# Patient Record
Sex: MALE | Race: WHITE | NOT HISPANIC OR LATINO | Employment: FULL TIME | URBAN - METROPOLITAN AREA
[De-identification: names, ages, dates, MRNs, and addresses within clinical notes are randomized per-mention and may not be internally consistent; named-entity substitution may affect disease eponyms.]

---

## 2021-03-03 ENCOUNTER — OFFICE VISIT (OUTPATIENT)
Dept: FAMILY MEDICINE CLINIC | Facility: CLINIC | Age: 56
End: 2021-03-03

## 2021-03-03 ENCOUNTER — LAB (OUTPATIENT)
Dept: FAMILY MEDICINE CLINIC | Facility: CLINIC | Age: 56
End: 2021-03-03

## 2021-03-03 VITALS
RESPIRATION RATE: 16 BRPM | HEART RATE: 69 BPM | OXYGEN SATURATION: 98 % | BODY MASS INDEX: 32.2 KG/M2 | DIASTOLIC BLOOD PRESSURE: 82 MMHG | TEMPERATURE: 96.9 F | WEIGHT: 259 LBS | SYSTOLIC BLOOD PRESSURE: 130 MMHG | HEIGHT: 75 IN

## 2021-03-03 DIAGNOSIS — I10 ESSENTIAL HYPERTENSION: ICD-10-CM

## 2021-03-03 DIAGNOSIS — E78.5 HYPERLIPIDEMIA, UNSPECIFIED HYPERLIPIDEMIA TYPE: ICD-10-CM

## 2021-03-03 DIAGNOSIS — Z00.00 ANNUAL PHYSICAL EXAM: Primary | ICD-10-CM

## 2021-03-03 DIAGNOSIS — Z12.5 SCREENING PSA (PROSTATE SPECIFIC ANTIGEN): ICD-10-CM

## 2021-03-03 DIAGNOSIS — B35.1 ONYCHOMYCOSIS: ICD-10-CM

## 2021-03-03 LAB
25(OH)D3 SERPL-MCNC: 44.9 NG/ML (ref 30–100)
ALBUMIN SERPL-MCNC: 4.3 G/DL (ref 3.5–5.2)
ALBUMIN/GLOB SERPL: 1.3 G/DL
ALP SERPL-CCNC: 78 U/L (ref 39–117)
ALT SERPL W P-5'-P-CCNC: 19 U/L (ref 1–41)
ANION GAP SERPL CALCULATED.3IONS-SCNC: 11.2 MMOL/L (ref 5–15)
AST SERPL-CCNC: 17 U/L (ref 1–40)
BASOPHILS # BLD AUTO: 0.05 10*3/MM3 (ref 0–0.2)
BASOPHILS NFR BLD AUTO: 0.6 % (ref 0–1.5)
BILIRUB SERPL-MCNC: 0.6 MG/DL (ref 0–1.2)
BUN SERPL-MCNC: 20 MG/DL (ref 6–20)
BUN/CREAT SERPL: 17.1 (ref 7–25)
CALCIUM SPEC-SCNC: 9.5 MG/DL (ref 8.6–10.5)
CHLORIDE SERPL-SCNC: 105 MMOL/L (ref 98–107)
CHOLEST SERPL-MCNC: 149 MG/DL (ref 0–200)
CO2 SERPL-SCNC: 25.8 MMOL/L (ref 22–29)
CREAT SERPL-MCNC: 1.17 MG/DL (ref 0.76–1.27)
DEPRECATED RDW RBC AUTO: 41.4 FL (ref 37–54)
EOSINOPHIL # BLD AUTO: 0.19 10*3/MM3 (ref 0–0.4)
EOSINOPHIL NFR BLD AUTO: 2.3 % (ref 0.3–6.2)
ERYTHROCYTE [DISTWIDTH] IN BLOOD BY AUTOMATED COUNT: 12.8 % (ref 12.3–15.4)
GFR SERPL CREATININE-BSD FRML MDRD: 64 ML/MIN/1.73
GLOBULIN UR ELPH-MCNC: 3.4 GM/DL
GLUCOSE SERPL-MCNC: 97 MG/DL (ref 65–99)
HBA1C MFR BLD: 5.2 % (ref 3.5–5.6)
HCT VFR BLD AUTO: 42.6 % (ref 37.5–51)
HCV AB SER DONR QL: NORMAL
HDLC SERPL-MCNC: 34 MG/DL (ref 40–60)
HGB BLD-MCNC: 14.3 G/DL (ref 13–17.7)
IMM GRANULOCYTES # BLD AUTO: 0.02 10*3/MM3 (ref 0–0.05)
IMM GRANULOCYTES NFR BLD AUTO: 0.2 % (ref 0–0.5)
LDLC SERPL CALC-MCNC: 94 MG/DL (ref 0–100)
LDLC/HDLC SERPL: 2.71 {RATIO}
LYMPHOCYTES # BLD AUTO: 2.86 10*3/MM3 (ref 0.7–3.1)
LYMPHOCYTES NFR BLD AUTO: 34.5 % (ref 19.6–45.3)
MCH RBC QN AUTO: 29.9 PG (ref 26.6–33)
MCHC RBC AUTO-ENTMCNC: 33.6 G/DL (ref 31.5–35.7)
MCV RBC AUTO: 89.1 FL (ref 79–97)
MONOCYTES # BLD AUTO: 0.71 10*3/MM3 (ref 0.1–0.9)
MONOCYTES NFR BLD AUTO: 8.6 % (ref 5–12)
NEUTROPHILS NFR BLD AUTO: 4.45 10*3/MM3 (ref 1.7–7)
NEUTROPHILS NFR BLD AUTO: 53.8 % (ref 42.7–76)
NRBC BLD AUTO-RTO: 0 /100 WBC (ref 0–0.2)
PLATELET # BLD AUTO: 297 10*3/MM3 (ref 140–450)
PMV BLD AUTO: 9.7 FL (ref 6–12)
POTASSIUM SERPL-SCNC: 4.2 MMOL/L (ref 3.5–5.2)
PROT SERPL-MCNC: 7.7 G/DL (ref 6–8.5)
PSA SERPL-MCNC: 0.89 NG/ML (ref 0–4)
RBC # BLD AUTO: 4.78 10*6/MM3 (ref 4.14–5.8)
SODIUM SERPL-SCNC: 142 MMOL/L (ref 136–145)
T4 FREE SERPL-MCNC: 1.02 NG/DL (ref 0.93–1.7)
TRIGL SERPL-MCNC: 114 MG/DL (ref 0–150)
TSH SERPL DL<=0.05 MIU/L-ACNC: 1.94 UIU/ML (ref 0.27–4.2)
VIT B12 BLD-MCNC: 582 PG/ML (ref 211–946)
VLDLC SERPL-MCNC: 21 MG/DL (ref 5–40)
WBC # BLD AUTO: 8.28 10*3/MM3 (ref 3.4–10.8)

## 2021-03-03 PROCEDURE — 86803 HEPATITIS C AB TEST: CPT | Performed by: FAMILY MEDICINE

## 2021-03-03 PROCEDURE — 84443 ASSAY THYROID STIM HORMONE: CPT | Performed by: FAMILY MEDICINE

## 2021-03-03 PROCEDURE — 84439 ASSAY OF FREE THYROXINE: CPT | Performed by: FAMILY MEDICINE

## 2021-03-03 PROCEDURE — 85025 COMPLETE CBC W/AUTO DIFF WBC: CPT | Performed by: FAMILY MEDICINE

## 2021-03-03 PROCEDURE — 83036 HEMOGLOBIN GLYCOSYLATED A1C: CPT | Performed by: FAMILY MEDICINE

## 2021-03-03 PROCEDURE — 80053 COMPREHEN METABOLIC PANEL: CPT | Performed by: FAMILY MEDICINE

## 2021-03-03 PROCEDURE — 99386 PREV VISIT NEW AGE 40-64: CPT | Performed by: FAMILY MEDICINE

## 2021-03-03 PROCEDURE — 82306 VITAMIN D 25 HYDROXY: CPT | Performed by: FAMILY MEDICINE

## 2021-03-03 PROCEDURE — 82607 VITAMIN B-12: CPT | Performed by: FAMILY MEDICINE

## 2021-03-03 PROCEDURE — 36415 COLL VENOUS BLD VENIPUNCTURE: CPT

## 2021-03-03 PROCEDURE — 80061 LIPID PANEL: CPT | Performed by: FAMILY MEDICINE

## 2021-03-03 PROCEDURE — G0103 PSA SCREENING: HCPCS | Performed by: FAMILY MEDICINE

## 2021-03-03 RX ORDER — FLUTICASONE PROPIONATE 50 MCG
2 SPRAY, SUSPENSION (ML) NASAL DAILY
COMMUNITY
End: 2022-03-09

## 2021-03-03 RX ORDER — ATORVASTATIN CALCIUM 20 MG/1
20 TABLET, FILM COATED ORAL
COMMUNITY
Start: 2021-02-20 | End: 2021-03-03 | Stop reason: SDUPTHER

## 2021-03-03 RX ORDER — AMLODIPINE BESYLATE 10 MG/1
10 TABLET ORAL
COMMUNITY
Start: 2021-02-18 | End: 2021-03-03 | Stop reason: SDUPTHER

## 2021-03-03 RX ORDER — AMLODIPINE BESYLATE 10 MG/1
10 TABLET ORAL
Qty: 90 TABLET | Refills: 3 | Status: SHIPPED | OUTPATIENT
Start: 2021-03-03 | End: 2021-05-19 | Stop reason: SDUPTHER

## 2021-03-03 RX ORDER — TERBINAFINE HYDROCHLORIDE 250 MG/1
250 TABLET ORAL DAILY
Qty: 84 TABLET | Refills: 0 | Status: SHIPPED | OUTPATIENT
Start: 2021-03-03 | End: 2021-03-31 | Stop reason: SDUPTHER

## 2021-03-03 RX ORDER — ATORVASTATIN CALCIUM 20 MG/1
20 TABLET, FILM COATED ORAL
Qty: 90 TABLET | Refills: 3 | Status: SHIPPED | OUTPATIENT
Start: 2021-03-03 | End: 2021-03-31 | Stop reason: SDUPTHER

## 2021-03-03 RX ORDER — MULTIPLE VITAMINS W/ MINERALS TAB 9MG-400MCG
1 TAB ORAL DAILY
COMMUNITY

## 2021-03-03 NOTE — PROGRESS NOTES
Chief Complaint   Patient presents with   • Annual Exam     New patient     HPI  Ulisses Rivera is a 55 y.o. male that presents for   Chief Complaint   Patient presents with   • Annual Exam     New patient     HTN: 130/82 today. Checks BP regularly and generally runs 125-130 systolic. Maintained on amlodipine 10 daily. No LH/dizziness, CP or SOB    HLD: maintained on atorvastatin 20mg daily. No myalgias or other complaints    Nail change: patient reports thickening of the 3rd-5th nails of the R foot. Ongoing for quite some time. Interested in treatment today    Review of Systems   Constitutional: Negative for chills, fever and unexpected weight loss.   HENT: Negative for congestion, rhinorrhea and sore throat.    Eyes: Negative for visual disturbance.   Respiratory: Negative for cough and shortness of breath.    Cardiovascular: Negative for chest pain and palpitations.   Gastrointestinal: Negative for abdominal pain, constipation, diarrhea, nausea and vomiting.   Genitourinary: Negative for difficulty urinating, dysuria and nocturia.   Musculoskeletal: Negative for arthralgias and joint swelling.   Skin: Positive for rash (nail change) and skin lesions.   Neurological: Negative for dizziness, weakness, light-headedness and headache.   Psychiatric/Behavioral: Negative for depressed mood. The patient is not nervous/anxious.      The following portions of the patient's history were reviewed and updated as appropriate: problem list, past medical history, past surgical history, allergies, current medications, past social history and past family history.    Problem List Tab  Patient History Tab  Immunizations Tab  Medications Tab  Chart Review Tab  Care Everywhere Tab  Synopsis Tab    PE  Vitals:    03/03/21 0802   BP: 130/82   Pulse: 69   Resp: 16   Temp: 96.9 °F (36.1 °C)   SpO2: 98%     Body mass index is 32.37 kg/m².  General: Obese, NAD  Head: AT/NC  Eyes: EOMI, anicteric sclera  ENT: MMM w/o erythema. TM clear  bilaterally  Neck: Supple, no thyromegaly or LAD. No carotid bruit  Resp: CTAB, SCR, BS equal  CV: RRR w/o m/r/g; 2+ pulses  GI: Soft, NT/ND, +BS  MSK: FROM, no deformity, no edema  Skin: Warm, dry, intact. Thickened, discolored nails to digits 3-5 of R foot. 0.5cm cyst to lateral aspect of L 2nd MCP.  Neuro: Alert and oriented. No focal deficits  Psych: Appropriate mood and affect    Imaging  No Images in the past 120 days found..    Assessment/Plan   Ulisses Rivera is a 55 y.o. male that presents for   Chief Complaint   Patient presents with   • Annual Exam     New patient     Diagnoses and all orders for this visit:    1. Annual physical exam (Primary)  -     Hepatitis C Antibody  -     CBC & Differential  -     Comprehensive Metabolic Panel  -     Hemoglobin A1c  -     Lipid Panel  -     TSH  -     T4, Free  -     Vitamin D 25 Hydroxy  -     Vitamin B12  -     PSA Screen  -     CBC Auto Differential  -  Counseled regarding diet, exercise, weight loss, and preventative health maintenance items/immunizations below    2. Essential hypertension: 130/82 today  -     Continue amLODIPine (NORVASC) 10 MG tablet; Take 1 tablet by mouth every night at bedtime.  Dispense: 90 tablet; Refill: 3    3. Hyperlipidemia, unspecified hyperlipidemia type  -     Continue atorvastatin (LIPITOR) 20 MG tablet; Take 1 tablet by mouth every night at bedtime.  Dispense: 90 tablet; Refill: 3    4. Onychomycosis  -     Start terbinafine (LamISIL) 250 MG tablet; Take 1 tablet by mouth Daily.  Dispense: 84 tablet; Refill: 0  -     Comprehensive metabolic panel; Future in 6 weeks    5. Screening PSA (prostate specific antigen)  -     PSA Screen    Preventative  Colonoscopy: 2016- report requested  PSA: Ordered today  Shingles: Recommended  Tdap: reportedly 2012  Influenza: 11/2020; Recommended  COVID: Scheduled for next week     Return in about 1 year (around 3/3/2022) for Annual physical.

## 2021-03-30 DIAGNOSIS — B35.1 ONYCHOMYCOSIS: ICD-10-CM

## 2021-03-30 DIAGNOSIS — E78.5 HYPERLIPIDEMIA, UNSPECIFIED HYPERLIPIDEMIA TYPE: ICD-10-CM

## 2021-03-30 RX ORDER — ATORVASTATIN CALCIUM 20 MG/1
20 TABLET, FILM COATED ORAL
Qty: 90 TABLET | Refills: 3 | Status: CANCELLED | OUTPATIENT
Start: 2021-03-30

## 2021-03-31 DIAGNOSIS — E78.5 HYPERLIPIDEMIA, UNSPECIFIED HYPERLIPIDEMIA TYPE: ICD-10-CM

## 2021-03-31 DIAGNOSIS — B35.1 ONYCHOMYCOSIS: ICD-10-CM

## 2021-03-31 RX ORDER — ATORVASTATIN CALCIUM 20 MG/1
20 TABLET, FILM COATED ORAL
Qty: 90 TABLET | Refills: 3 | Status: SHIPPED | OUTPATIENT
Start: 2021-03-31 | End: 2021-05-19 | Stop reason: SDUPTHER

## 2021-03-31 RX ORDER — TERBINAFINE HYDROCHLORIDE 250 MG/1
250 TABLET ORAL DAILY
Qty: 84 TABLET | Refills: 0 | OUTPATIENT
Start: 2021-03-31

## 2021-03-31 RX ORDER — TERBINAFINE HYDROCHLORIDE 250 MG/1
250 TABLET ORAL DAILY
Qty: 30 TABLET | Refills: 1 | Status: SHIPPED | OUTPATIENT
Start: 2021-03-31 | End: 2021-04-01 | Stop reason: SDUPTHER

## 2021-04-01 DIAGNOSIS — B35.1 ONYCHOMYCOSIS: ICD-10-CM

## 2021-04-01 RX ORDER — TERBINAFINE HYDROCHLORIDE 250 MG/1
250 TABLET ORAL DAILY
Qty: 30 TABLET | Refills: 1 | Status: SHIPPED | OUTPATIENT
Start: 2021-04-01 | End: 2022-03-09

## 2021-04-26 ENCOUNTER — PROCEDURE VISIT (OUTPATIENT)
Dept: FAMILY MEDICINE CLINIC | Facility: CLINIC | Age: 56
End: 2021-04-26

## 2021-04-26 VITALS
BODY MASS INDEX: 32.47 KG/M2 | OXYGEN SATURATION: 98 % | HEIGHT: 75 IN | RESPIRATION RATE: 16 BRPM | HEART RATE: 73 BPM | WEIGHT: 261.13 LBS | SYSTOLIC BLOOD PRESSURE: 142 MMHG | DIASTOLIC BLOOD PRESSURE: 94 MMHG | TEMPERATURE: 96.9 F

## 2021-04-26 DIAGNOSIS — D22.4 ATYPICAL NEVUS OF NECK: ICD-10-CM

## 2021-04-26 DIAGNOSIS — L91.8 SKIN TAG: Primary | ICD-10-CM

## 2021-04-26 DIAGNOSIS — D22.9 MULTIPLE NEVI: ICD-10-CM

## 2021-04-26 PROCEDURE — 17111 DESTRUCTION B9 LESIONS 15/>: CPT | Performed by: PHYSICIAN ASSISTANT

## 2021-04-26 PROCEDURE — 11200 RMVL SKIN TAGS UP TO&INC 15: CPT | Performed by: PHYSICIAN ASSISTANT

## 2021-04-26 PROCEDURE — 99213 OFFICE O/P EST LOW 20 MIN: CPT | Performed by: PHYSICIAN ASSISTANT

## 2021-04-26 NOTE — PROGRESS NOTES
"Subjective   Ulisses Rivera is a 55 y.o. male.     Chief Complaint   Patient presents with   • Nevus       /94 (BP Location: Left arm, Patient Position: Sitting, Cuff Size: Adult)   Pulse 73   Temp 96.9 °F (36.1 °C) (Skin)   Resp 16   Ht 190.5 cm (75\")   Wt 118 kg (261 lb 2 oz)   SpO2 98%   BMI 32.64 kg/m²     BP Readings from Last 3 Encounters:   04/26/21 142/94   03/03/21 130/82       Wt Readings from Last 3 Encounters:   04/26/21 118 kg (261 lb 2 oz)   03/03/21 117 kg (259 lb)       HPI patient presents to the clinic for evaluation of multiple nevus and for skin tags on his face and on his neck.  The first time I am seeing the patient for this however he typically sees Dr. Agosto.  It was told to come into the office to have a procedure performed to have the nevus removed.  He has seen dermatology in the past.  He does not notice any changing of any of the moles.  He does travel for work frequently.  He has never been diagnosed with a skin cancer in the past.     The following portions of the patient's history were reviewed and updated as appropriate: allergies, current medications, past family history, past medical history, past social history, past surgical history and problem list.    Review of Systems    Objective     Physical Exam  Constitutional:       Appearance: Normal appearance.   Eyes:      Extraocular Movements: Extraocular movements intact.      Pupils: Pupils are equal, round, and reactive to light.   Skin:     Comments: Multiple scattered maculopapular brown and skin colored raised lesions to the face and the neck. 2 pedunculated skin colored lesions to the right side of the face and the back left portion of the neck. One irregularly shaped flat dark colored macule to the right neck.    Neurological:      General: No focal deficit present.      Mental Status: He is alert and oriented to person, place, and time.   Psychiatric:         Mood and Affect: Mood normal.         Behavior: " Behavior normal.       Skin Tag Removal    Date/Time: 4/26/2021 5:50 PM  Performed by: Neal Villatoro PA-C  Authorized by: Neal Villatoro PA-C   Preparation: Patient was prepped and draped in the usual sterile fashion.  Local anesthesia used: yes  Anesthesia: local infiltration    Anesthesia:  Local anesthesia used: yes  Local Anesthetic: lidocaine 1% with epinephrine  Anesthetic total: 1 mL    Sedation:  Patient sedated: no    Comments: 2 skin tags removed.       Cryotherapy, Skin Lesion    Date/Time: 4/26/2021 5:51 PM  Performed by: eNal Villatoro PA-C  Authorized by: Neal Villatoro PA-C   Local anesthesia used: no    Anesthesia:  Local anesthesia used: no    Sedation:  Patient sedated: no    Patient tolerance: patient tolerated the procedure well with no immediate complications  Comments: >15 skin lesions               Diagnoses and all orders for this visit:    1. Skin tag (Primary)    2. Multiple nevi    3. Atypical nevus of neck  Comments:  watch for evolving characteristics. Follow up with derm for this lesion.    Other orders  -     Skin Tag Removal  -     Cryotherapy, Skin Lesion  -     Cryotherapy, Skin Lesion        Return if symptoms worsen or fail to improve.

## 2021-05-19 DIAGNOSIS — E78.5 HYPERLIPIDEMIA, UNSPECIFIED HYPERLIPIDEMIA TYPE: ICD-10-CM

## 2021-05-19 DIAGNOSIS — I10 ESSENTIAL HYPERTENSION: ICD-10-CM

## 2021-05-19 NOTE — TELEPHONE ENCOUNTER
----- Message from Jane Liang CMA sent at 5/19/2021 11:40 AM EDT -----  Regarding: FW: Prescription Question  Contact: [phone]    ----- Message -----  From: Ulisses Rivera  Sent: 5/19/2021  10:03 AM EDT  To: Ulisses Agosto MD  Subject: Prescription Question                            I am in [place] for next week and forgot my blood pressure and cholesterol medication. Are you able to call into a [facility] at [facility] [address] in [place] my prescriptions so I do not have to go a week without them?? Exact address is below.    [address] [place] [address] [place] [address]

## 2021-05-20 RX ORDER — ATORVASTATIN CALCIUM 20 MG/1
20 TABLET, FILM COATED ORAL
Qty: 10 TABLET | Refills: 0 | Status: SHIPPED | OUTPATIENT
Start: 2021-05-20 | End: 2022-03-09 | Stop reason: SDUPTHER

## 2021-05-20 RX ORDER — AMLODIPINE BESYLATE 10 MG/1
10 TABLET ORAL
Qty: 10 TABLET | Refills: 0 | Status: SHIPPED | OUTPATIENT
Start: 2021-05-20 | End: 2022-03-09 | Stop reason: SDUPTHER

## 2021-09-03 ENCOUNTER — OFFICE VISIT (OUTPATIENT)
Dept: FAMILY MEDICINE CLINIC | Facility: CLINIC | Age: 56
End: 2021-09-03

## 2021-09-03 VITALS
SYSTOLIC BLOOD PRESSURE: 122 MMHG | BODY MASS INDEX: 31.71 KG/M2 | HEIGHT: 75 IN | OXYGEN SATURATION: 97 % | DIASTOLIC BLOOD PRESSURE: 78 MMHG | HEART RATE: 78 BPM | WEIGHT: 255 LBS | RESPIRATION RATE: 16 BRPM

## 2021-09-03 DIAGNOSIS — L81.9 PIGMENTED SKIN LESION SUSPICIOUS FOR MALIGNANT NEOPLASM: ICD-10-CM

## 2021-09-03 DIAGNOSIS — D22.9 MULTIPLE NEVI: Primary | ICD-10-CM

## 2021-09-03 PROCEDURE — 99213 OFFICE O/P EST LOW 20 MIN: CPT | Performed by: PHYSICIAN ASSISTANT

## 2021-09-03 NOTE — PROGRESS NOTES
"Subjective   Ulisses Rivera is a 55 y.o. male.     Chief Complaint   Patient presents with   • Suspicious Skin Lesion     multiple       /78 (BP Location: Left arm, Patient Position: Sitting, Cuff Size: Adult)   Pulse 78   Resp 16   Ht 190.5 cm (75\")   Wt 116 kg (255 lb)   SpO2 97%   BMI 31.87 kg/m²     BP Readings from Last 3 Encounters:   09/03/21 122/78   04/26/21 142/94   03/03/21 130/82       Wt Readings from Last 3 Encounters:   09/03/21 116 kg (255 lb)   04/26/21 118 kg (261 lb 2 oz)   03/03/21 117 kg (259 lb)       HPI Patient presents to the clinic with multiple skin lesions he would like looked at. Had some froze at previous visit. He has seen derm in the past. No history of skin cancer. Travels a lot. No other issue.s     The following portions of the patient's history were reviewed and updated as appropriate: allergies, current medications, past family history, past medical history, past social history, past surgical history and problem list.    Review of Systems    Objective     Physical Exam  Constitutional:       Appearance: Normal appearance.   Eyes:      Extraocular Movements: Extraocular movements intact.      Pupils: Pupils are equal, round, and reactive to light.   Skin:     Comments: Multiple scattered maculopapular brown and skin colored raised lesions to the face and the neck.. One irregularly shaped flat dark colored macule to the right neck. One raised red round lesion to the top parietal area of the scalp.    Neurological:      General: No focal deficit present.      Mental Status: He is alert and oriented to person, place, and time.   Psychiatric:         Mood and Affect: Mood normal.         Behavior: Behavior normal.       Cryotherapy, Skin Lesion    Date/Time: 9/3/2021 12:52 PM  Performed by: Neal Villatoro PA-C  Authorized by: Neal Villatoro PA-C   Local anesthesia used: no    Anesthesia:  Local anesthesia used: no    Sedation:  Patient sedated: no    Patient " tolerance: patient tolerated the procedure well with no immediate complications          Diagnoses and all orders for this visit:    1. Multiple nevi (Primary)  -     Ambulatory Referral to Dermatology    2. Pigmented skin lesion suspicious for malignant neoplasm  -     Ambulatory Referral to Dermatology    Other orders  -     ECG 12 Lead  -     Cryotherapy, Skin Lesion        No follow-ups on file.

## 2022-03-09 ENCOUNTER — LAB (OUTPATIENT)
Dept: FAMILY MEDICINE CLINIC | Facility: CLINIC | Age: 57
End: 2022-03-09

## 2022-03-09 ENCOUNTER — PATIENT ROUNDING (BHMG ONLY) (OUTPATIENT)
Dept: FAMILY MEDICINE CLINIC | Facility: CLINIC | Age: 57
End: 2022-03-09

## 2022-03-09 ENCOUNTER — OFFICE VISIT (OUTPATIENT)
Dept: FAMILY MEDICINE CLINIC | Facility: CLINIC | Age: 57
End: 2022-03-09

## 2022-03-09 VITALS
TEMPERATURE: 97.3 F | DIASTOLIC BLOOD PRESSURE: 88 MMHG | SYSTOLIC BLOOD PRESSURE: 116 MMHG | HEIGHT: 75 IN | BODY MASS INDEX: 32.7 KG/M2 | HEART RATE: 75 BPM | RESPIRATION RATE: 18 BRPM | OXYGEN SATURATION: 97 % | WEIGHT: 263 LBS

## 2022-03-09 DIAGNOSIS — Z12.5 SCREENING PSA (PROSTATE SPECIFIC ANTIGEN): ICD-10-CM

## 2022-03-09 DIAGNOSIS — E78.5 HYPERLIPIDEMIA, UNSPECIFIED HYPERLIPIDEMIA TYPE: ICD-10-CM

## 2022-03-09 DIAGNOSIS — I10 ESSENTIAL HYPERTENSION: ICD-10-CM

## 2022-03-09 DIAGNOSIS — Z00.00 ANNUAL PHYSICAL EXAM: Primary | ICD-10-CM

## 2022-03-09 DIAGNOSIS — I10 PRIMARY HYPERTENSION: ICD-10-CM

## 2022-03-09 LAB
25(OH)D3 SERPL-MCNC: 38.8 NG/ML (ref 30–100)
ALBUMIN SERPL-MCNC: 4.7 G/DL (ref 3.5–5.2)
ALBUMIN/GLOB SERPL: 1.5 G/DL
ALP SERPL-CCNC: 93 U/L (ref 39–117)
ALT SERPL W P-5'-P-CCNC: 11 U/L (ref 1–41)
ANION GAP SERPL CALCULATED.3IONS-SCNC: 12.4 MMOL/L (ref 5–15)
AST SERPL-CCNC: 15 U/L (ref 1–40)
BASOPHILS # BLD AUTO: 0.04 10*3/MM3 (ref 0–0.2)
BASOPHILS NFR BLD AUTO: 0.7 % (ref 0–1.5)
BILIRUB SERPL-MCNC: 0.6 MG/DL (ref 0–1.2)
BUN SERPL-MCNC: 16 MG/DL (ref 6–20)
BUN/CREAT SERPL: 13.8 (ref 7–25)
CALCIUM SPEC-SCNC: 9.5 MG/DL (ref 8.6–10.5)
CHLORIDE SERPL-SCNC: 105 MMOL/L (ref 98–107)
CHOLEST SERPL-MCNC: 152 MG/DL (ref 0–200)
CO2 SERPL-SCNC: 24.6 MMOL/L (ref 22–29)
CREAT SERPL-MCNC: 1.16 MG/DL (ref 0.76–1.27)
DEPRECATED RDW RBC AUTO: 41.8 FL (ref 37–54)
EGFRCR SERPLBLD CKD-EPI 2021: 73.5 ML/MIN/1.73
EOSINOPHIL # BLD AUTO: 0.22 10*3/MM3 (ref 0–0.4)
EOSINOPHIL NFR BLD AUTO: 3.6 % (ref 0.3–6.2)
ERYTHROCYTE [DISTWIDTH] IN BLOOD BY AUTOMATED COUNT: 12.6 % (ref 12.3–15.4)
GLOBULIN UR ELPH-MCNC: 3.1 GM/DL
GLUCOSE SERPL-MCNC: 107 MG/DL (ref 65–99)
HBA1C MFR BLD: 5.1 % (ref 3.5–5.6)
HCT VFR BLD AUTO: 42.7 % (ref 37.5–51)
HDLC SERPL-MCNC: 32 MG/DL (ref 40–60)
HGB BLD-MCNC: 14.2 G/DL (ref 13–17.7)
IMM GRANULOCYTES # BLD AUTO: 0 10*3/MM3 (ref 0–0.05)
IMM GRANULOCYTES NFR BLD AUTO: 0 % (ref 0–0.5)
LDLC SERPL CALC-MCNC: 100 MG/DL (ref 0–100)
LDLC/HDLC SERPL: 3.09 {RATIO}
LYMPHOCYTES # BLD AUTO: 2.43 10*3/MM3 (ref 0.7–3.1)
LYMPHOCYTES NFR BLD AUTO: 40 % (ref 19.6–45.3)
MCH RBC QN AUTO: 29.7 PG (ref 26.6–33)
MCHC RBC AUTO-ENTMCNC: 33.3 G/DL (ref 31.5–35.7)
MCV RBC AUTO: 89.3 FL (ref 79–97)
MONOCYTES # BLD AUTO: 0.58 10*3/MM3 (ref 0.1–0.9)
MONOCYTES NFR BLD AUTO: 9.5 % (ref 5–12)
NEUTROPHILS NFR BLD AUTO: 2.81 10*3/MM3 (ref 1.7–7)
NEUTROPHILS NFR BLD AUTO: 46.2 % (ref 42.7–76)
NRBC BLD AUTO-RTO: 0 /100 WBC (ref 0–0.2)
PLATELET # BLD AUTO: 274 10*3/MM3 (ref 140–450)
PMV BLD AUTO: 9.7 FL (ref 6–12)
POTASSIUM SERPL-SCNC: 4 MMOL/L (ref 3.5–5.2)
PROT SERPL-MCNC: 7.8 G/DL (ref 6–8.5)
PSA SERPL-MCNC: 0.45 NG/ML (ref 0–4)
RBC # BLD AUTO: 4.78 10*6/MM3 (ref 4.14–5.8)
SODIUM SERPL-SCNC: 142 MMOL/L (ref 136–145)
T4 FREE SERPL-MCNC: 1.08 NG/DL (ref 0.93–1.7)
TRIGL SERPL-MCNC: 105 MG/DL (ref 0–150)
TSH SERPL DL<=0.05 MIU/L-ACNC: 1.75 UIU/ML (ref 0.27–4.2)
VIT B12 BLD-MCNC: 446 PG/ML (ref 211–946)
VLDLC SERPL-MCNC: 20 MG/DL (ref 5–40)
WBC NRBC COR # BLD: 6.08 10*3/MM3 (ref 3.4–10.8)

## 2022-03-09 PROCEDURE — 36415 COLL VENOUS BLD VENIPUNCTURE: CPT | Performed by: FAMILY MEDICINE

## 2022-03-09 PROCEDURE — 80053 COMPREHEN METABOLIC PANEL: CPT | Performed by: FAMILY MEDICINE

## 2022-03-09 PROCEDURE — 84443 ASSAY THYROID STIM HORMONE: CPT | Performed by: FAMILY MEDICINE

## 2022-03-09 PROCEDURE — 82607 VITAMIN B-12: CPT | Performed by: FAMILY MEDICINE

## 2022-03-09 PROCEDURE — G0103 PSA SCREENING: HCPCS | Performed by: FAMILY MEDICINE

## 2022-03-09 PROCEDURE — 80061 LIPID PANEL: CPT | Performed by: FAMILY MEDICINE

## 2022-03-09 PROCEDURE — 84439 ASSAY OF FREE THYROXINE: CPT | Performed by: FAMILY MEDICINE

## 2022-03-09 PROCEDURE — 83036 HEMOGLOBIN GLYCOSYLATED A1C: CPT | Performed by: FAMILY MEDICINE

## 2022-03-09 PROCEDURE — 85025 COMPLETE CBC W/AUTO DIFF WBC: CPT | Performed by: FAMILY MEDICINE

## 2022-03-09 PROCEDURE — 99396 PREV VISIT EST AGE 40-64: CPT | Performed by: FAMILY MEDICINE

## 2022-03-09 PROCEDURE — 82306 VITAMIN D 25 HYDROXY: CPT | Performed by: FAMILY MEDICINE

## 2022-03-09 RX ORDER — AMLODIPINE BESYLATE 10 MG/1
10 TABLET ORAL
Qty: 90 TABLET | Refills: 4 | Status: SHIPPED | OUTPATIENT
Start: 2022-03-09 | End: 2023-04-02 | Stop reason: SDUPTHER

## 2022-03-09 RX ORDER — ATORVASTATIN CALCIUM 20 MG/1
20 TABLET, FILM COATED ORAL
Qty: 90 TABLET | Refills: 4 | Status: SHIPPED | OUTPATIENT
Start: 2022-03-09 | End: 2023-03-20 | Stop reason: SDUPTHER

## 2022-03-09 RX ORDER — AMLODIPINE BESYLATE 10 MG/1
10 TABLET ORAL
Qty: 90 TABLET | Refills: 1 | Status: SHIPPED | OUTPATIENT
Start: 2022-03-09 | End: 2022-08-15

## 2022-03-09 RX ORDER — ATORVASTATIN CALCIUM 20 MG/1
20 TABLET, FILM COATED ORAL
Qty: 90 TABLET | Refills: 1 | Status: SHIPPED | OUTPATIENT
Start: 2022-03-09 | End: 2022-08-15

## 2022-03-09 NOTE — PROGRESS NOTES
March 9, 2022    Kloud[employer]atch Message sent to Ulisses Rivera    From: Jessica [place]  [facility]  [address]  [place] [address].

## 2022-03-09 NOTE — PROGRESS NOTES
Chief Complaint   Patient presents with   • Annual Exam     HPI  Ulisses Rivera is a 56 y.o. male that presents for   Chief Complaint   Patient presents with   • Annual Exam     HTN: 116/88 today. Maintained on amlodipine 10 daily. No LH/dizziness, CP, SOB, or leg swelling     HLD: maintained on atorvastatin 20mg daily. No myalgias or other complaints    Exercising 3-4 days/week.     Review of Systems   Constitutional: Negative for chills, fever and unexpected weight loss.   HENT: Negative for congestion, rhinorrhea and sore throat.    Eyes: Negative for visual disturbance.   Respiratory: Negative for cough and shortness of breath.    Cardiovascular: Negative for chest pain and palpitations.   Gastrointestinal: Negative for abdominal pain, constipation, diarrhea, nausea and vomiting.   Genitourinary: Negative for difficulty urinating and dysuria.   Musculoskeletal: Negative for arthralgias and joint swelling.   Skin: Negative for rash and skin lesions.   Neurological: Negative for weakness and headache.   Psychiatric/Behavioral: Negative for depressed mood. The patient is not nervous/anxious.      The following portions of the patient's history were reviewed and updated as appropriate: problem list, past medical history, past surgical history, allergies, current medications, past social history and past family history.    Problem List Tab  Patient History Tab  Immunizations Tab  Medications Tab  Chart Review Tab  Care Everywhere Tab  Synopsis Tab    PE  Vitals:    03/09/22 0840   BP: 116/88   Pulse: 75   Resp: 18   Temp: 97.3 °F (36.3 °C)   SpO2: 97%     Body mass index is 32.87 kg/m².  General: Obese, NAD  Head: AT/NC  Eyes: EOMI, anicteric sclera  ENT: MMM w/o erythema. TM clear bilaterally  Neck: Supple, no thyromegaly or LAD. No carotid bruit  Resp: CTAB, SCR, BS equal  CV: RRR w/o m/r/g; 2+ pulses  GI: Soft, NT/ND, +BS  MSK: FROM, no deformity, no edema  Skin: Warm, dry, intact  Neuro: Alert and oriented. No  focal deficits  Psych: Appropriate mood and affect    Imaging  No Images in the past 120 days found..    Assessment/Plan   Ulisses Rivera is a 56 y.o. male that presents for   Chief Complaint   Patient presents with   • Annual Exam     Diagnoses and all orders for this visit:    1. Annual physical exam (Primary)  -     CBC & Differential  -     Comprehensive Metabolic Panel  -     Hemoglobin A1c  -     Lipid Panel  -     TSH  -     T4, Free  -     Vitamin D 25 Hydroxy  -     Vitamin B12  -     PSA Screen  -     Ambulatory Referral For Screening Colonoscopy  -  Counseled regarding diet, exercise, weight loss, and preventative health maintenance items/immunizations below    2. Primary hypertension: 116/88 today  -     continue amLODIPine (NORVASC) 10 MG tablet; Take 1 tablet by mouth every night at bedtime.  Dispense: 90 tablet; Refill: 4    3. Hyperlipidemia, unspecified hyperlipidemia type  -     continue atorvastatin (LIPITOR) 20 MG tablet; Take 1 tablet by mouth every night at bedtime.  Dispense: 90 tablet; Refill: 4    4. Screening PSA (prostate specific antigen)  -     PSA Screen    Preventative  Colonoscopy: 6/2016, due 6/2021- ordered today  PSA: 0.888 (3/2021) Ordered today  Shingles: Completed Shingrix 1/2022  Tdap: reportedly 2017  Influenza: 8/2021; Recommended  COVID: Completed 3 shots Pfizer       Return in about 1 year (around 3/9/2023) for Annual physical.

## 2022-08-14 DIAGNOSIS — I10 ESSENTIAL HYPERTENSION: ICD-10-CM

## 2022-08-14 DIAGNOSIS — E78.5 HYPERLIPIDEMIA, UNSPECIFIED HYPERLIPIDEMIA TYPE: ICD-10-CM

## 2022-08-15 RX ORDER — AMLODIPINE BESYLATE 10 MG/1
10 TABLET ORAL
Qty: 90 TABLET | Refills: 1 | Status: SHIPPED | OUTPATIENT
Start: 2022-08-15 | End: 2023-02-16

## 2022-08-15 RX ORDER — ATORVASTATIN CALCIUM 20 MG/1
20 TABLET, FILM COATED ORAL
Qty: 90 TABLET | Refills: 1 | Status: SHIPPED | OUTPATIENT
Start: 2022-08-15 | End: 2023-03-20

## 2022-09-28 ENCOUNTER — ON CAMPUS - OUTPATIENT (AMBULATORY)
Dept: URBAN - METROPOLITAN AREA HOSPITAL 2 | Facility: HOSPITAL | Age: 57
End: 2022-09-28
Payer: COMMERCIAL

## 2022-09-28 VITALS
OXYGEN SATURATION: 97 % | HEART RATE: 65 BPM | OXYGEN SATURATION: 99 % | OXYGEN SATURATION: 100 % | HEART RATE: 61 BPM | DIASTOLIC BLOOD PRESSURE: 96 MMHG | WEIGHT: 261 LBS | SYSTOLIC BLOOD PRESSURE: 153 MMHG | HEART RATE: 71 BPM | HEIGHT: 75 IN | HEART RATE: 60 BPM | DIASTOLIC BLOOD PRESSURE: 95 MMHG | HEART RATE: 69 BPM | DIASTOLIC BLOOD PRESSURE: 80 MMHG | DIASTOLIC BLOOD PRESSURE: 85 MMHG | DIASTOLIC BLOOD PRESSURE: 70 MMHG | HEART RATE: 58 BPM | SYSTOLIC BLOOD PRESSURE: 129 MMHG | TEMPERATURE: 97.1 F | SYSTOLIC BLOOD PRESSURE: 107 MMHG | DIASTOLIC BLOOD PRESSURE: 74 MMHG | SYSTOLIC BLOOD PRESSURE: 108 MMHG | RESPIRATION RATE: 17 BRPM | HEART RATE: 62 BPM | RESPIRATION RATE: 16 BRPM | RESPIRATION RATE: 14 BRPM | DIASTOLIC BLOOD PRESSURE: 75 MMHG | HEART RATE: 72 BPM | DIASTOLIC BLOOD PRESSURE: 78 MMHG | DIASTOLIC BLOOD PRESSURE: 84 MMHG | OXYGEN SATURATION: 98 % | SYSTOLIC BLOOD PRESSURE: 137 MMHG | SYSTOLIC BLOOD PRESSURE: 125 MMHG | SYSTOLIC BLOOD PRESSURE: 116 MMHG | RESPIRATION RATE: 18 BRPM | SYSTOLIC BLOOD PRESSURE: 102 MMHG | SYSTOLIC BLOOD PRESSURE: 112 MMHG

## 2022-09-28 DIAGNOSIS — K57.30 DIVERTICULOSIS OF LARGE INTESTINE WITHOUT PERFORATION OR ABS: ICD-10-CM

## 2022-09-28 DIAGNOSIS — Z12.11 ENCOUNTER FOR SCREENING FOR MALIGNANT NEOPLASM OF COLON: ICD-10-CM

## 2022-09-28 PROCEDURE — 45378 DIAGNOSTIC COLONOSCOPY: CPT | Mod: 33 | Performed by: INTERNAL MEDICINE

## 2023-02-16 DIAGNOSIS — I10 ESSENTIAL HYPERTENSION: ICD-10-CM

## 2023-02-16 RX ORDER — AMLODIPINE BESYLATE 10 MG/1
10 TABLET ORAL
Qty: 90 TABLET | Refills: 1 | Status: SHIPPED | OUTPATIENT
Start: 2023-02-16 | End: 2023-03-28 | Stop reason: SDUPTHER

## 2023-03-18 DIAGNOSIS — E78.5 HYPERLIPIDEMIA, UNSPECIFIED HYPERLIPIDEMIA TYPE: ICD-10-CM

## 2023-03-20 RX ORDER — ATORVASTATIN CALCIUM 20 MG/1
20 TABLET, FILM COATED ORAL
Qty: 90 TABLET | Refills: 1 | Status: SHIPPED | OUTPATIENT
Start: 2023-03-20 | End: 2023-04-03 | Stop reason: SDUPTHER

## 2023-03-28 ENCOUNTER — OFFICE VISIT (OUTPATIENT)
Dept: FAMILY MEDICINE CLINIC | Facility: CLINIC | Age: 58
End: 2023-03-28
Payer: COMMERCIAL

## 2023-03-28 ENCOUNTER — LAB (OUTPATIENT)
Dept: FAMILY MEDICINE CLINIC | Facility: CLINIC | Age: 58
End: 2023-03-28
Payer: COMMERCIAL

## 2023-03-28 VITALS
WEIGHT: 259.4 LBS | HEART RATE: 68 BPM | SYSTOLIC BLOOD PRESSURE: 134 MMHG | DIASTOLIC BLOOD PRESSURE: 70 MMHG | OXYGEN SATURATION: 100 % | RESPIRATION RATE: 18 BRPM | HEIGHT: 75 IN | BODY MASS INDEX: 32.25 KG/M2

## 2023-03-28 DIAGNOSIS — I10 ESSENTIAL HYPERTENSION: ICD-10-CM

## 2023-03-28 DIAGNOSIS — R30.0 DYSURIA: ICD-10-CM

## 2023-03-28 DIAGNOSIS — Z12.5 SCREENING PSA (PROSTATE SPECIFIC ANTIGEN): ICD-10-CM

## 2023-03-28 DIAGNOSIS — Z00.00 ANNUAL PHYSICAL EXAM: ICD-10-CM

## 2023-03-28 DIAGNOSIS — Z00.00 ANNUAL PHYSICAL EXAM: Primary | ICD-10-CM

## 2023-03-28 DIAGNOSIS — E78.5 HYPERLIPIDEMIA, UNSPECIFIED HYPERLIPIDEMIA TYPE: ICD-10-CM

## 2023-03-28 LAB
BILIRUB UR QL STRIP: NEGATIVE
C TRACH RRNA CVX QL NAA+PROBE: NOT DETECTED
CLARITY UR: ABNORMAL
COLOR UR: YELLOW
GLUCOSE UR STRIP-MCNC: NEGATIVE MG/DL
HGB UR QL STRIP.AUTO: NEGATIVE
KETONES UR QL STRIP: NEGATIVE
LEUKOCYTE ESTERASE UR QL STRIP.AUTO: NEGATIVE
N GONORRHOEA RRNA SPEC QL NAA+PROBE: NOT DETECTED
NITRITE UR QL STRIP: NEGATIVE
PH UR STRIP.AUTO: 6 [PH] (ref 5–8)
PROT UR QL STRIP: NEGATIVE
SP GR UR STRIP: 1.01 (ref 1–1.03)
UROBILINOGEN UR QL STRIP: ABNORMAL

## 2023-03-28 PROCEDURE — G0432 EIA HIV-1/HIV-2 SCREEN: HCPCS | Performed by: PHYSICIAN ASSISTANT

## 2023-03-28 PROCEDURE — 36415 COLL VENOUS BLD VENIPUNCTURE: CPT

## 2023-03-28 PROCEDURE — 85025 COMPLETE CBC W/AUTO DIFF WBC: CPT | Performed by: PHYSICIAN ASSISTANT

## 2023-03-28 PROCEDURE — 80074 ACUTE HEPATITIS PANEL: CPT | Performed by: PHYSICIAN ASSISTANT

## 2023-03-28 PROCEDURE — 87491 CHLMYD TRACH DNA AMP PROBE: CPT | Performed by: PHYSICIAN ASSISTANT

## 2023-03-28 PROCEDURE — 99396 PREV VISIT EST AGE 40-64: CPT | Performed by: PHYSICIAN ASSISTANT

## 2023-03-28 PROCEDURE — G0103 PSA SCREENING: HCPCS | Performed by: PHYSICIAN ASSISTANT

## 2023-03-28 PROCEDURE — 81003 URINALYSIS AUTO W/O SCOPE: CPT | Performed by: PHYSICIAN ASSISTANT

## 2023-03-28 PROCEDURE — 80061 LIPID PANEL: CPT | Performed by: PHYSICIAN ASSISTANT

## 2023-03-28 PROCEDURE — 83036 HEMOGLOBIN GLYCOSYLATED A1C: CPT | Performed by: PHYSICIAN ASSISTANT

## 2023-03-28 PROCEDURE — 87591 N.GONORRHOEAE DNA AMP PROB: CPT | Performed by: PHYSICIAN ASSISTANT

## 2023-03-28 PROCEDURE — 80053 COMPREHEN METABOLIC PANEL: CPT | Performed by: PHYSICIAN ASSISTANT

## 2023-03-28 NOTE — PROGRESS NOTES
"Subjective   Ulisses Rivera is a 57 y.o. male.     Chief Complaint   Patient presents with   • Annual Exam     He is fasting for labs  No issues, no med refills       /70   Pulse 68   Resp 18   Ht 190.5 cm (75\")   Wt 118 kg (259 lb 6.4 oz)   SpO2 100%   BMI 32.42 kg/m²     BP Readings from Last 3 Encounters:   03/28/23 134/70   03/09/22 116/88   09/03/21 122/78       Wt Readings from Last 3 Encounters:   03/28/23 118 kg (259 lb 6.4 oz)   03/09/22 119 kg (263 lb)   09/03/21 116 kg (255 lb)       HPI Presents to the clinic for annual physical. Doing well. Trying to watch diet but travels. No chest pain, soa, headaches. Has had some dysuria that resolved. Has one new sexual partner. Screenings utd. Had some bph problems but improved.     The following portions of the patient's history were reviewed and updated as appropriate: allergies, current medications, past family history, past medical history, past social history, past surgical history and problem list.    Review of Systems    Objective   Physical Exam  Constitutional:       Appearance: He is well-developed.   HENT:      Head: Normocephalic and atraumatic.      Right Ear: Tympanic membrane normal.      Left Ear: Tympanic membrane normal.      Nose: No congestion.      Mouth/Throat:      Pharynx: No oropharyngeal exudate.   Eyes:      Conjunctiva/sclera: Conjunctivae normal.      Pupils: Pupils are equal, round, and reactive to light.   Cardiovascular:      Rate and Rhythm: Normal rate and regular rhythm.      Heart sounds: No murmur heard.  Pulmonary:      Effort: Pulmonary effort is normal.      Breath sounds: Normal breath sounds.   Abdominal:      General: Bowel sounds are normal.      Palpations: Abdomen is soft.      Tenderness: There is no abdominal tenderness.   Musculoskeletal:         General: No deformity. Normal range of motion.      Cervical back: Normal range of motion and neck supple.   Lymphadenopathy:      Cervical: No cervical " adenopathy.   Skin:     General: Skin is warm and dry.      Findings: No rash.   Neurological:      Mental Status: He is alert and oriented to person, place, and time.      Cranial Nerves: No cranial nerve deficit.   Psychiatric:         Behavior: Behavior normal.         Thought Content: Thought content normal.         Judgment: Judgment normal.           Diagnoses and all orders for this visit:    1. Annual physical exam (Primary)  -     CBC w AUTO Differential; Future  -     Comprehensive metabolic panel; Future  -     Lipid panel; Future  -     Hemoglobin A1c; Future    2. Essential hypertension  -     CBC w AUTO Differential; Future  -     Comprehensive metabolic panel; Future  -     Lipid panel; Future  -     Hemoglobin A1c; Future    3. Hyperlipidemia, unspecified hyperlipidemia type  -     Lipid panel; Future    4. Dysuria  -     Urinalysis With Culture If Indicated -; Future  -     Chlamydia trachomatis, Neisseria gonorrhoeae, PCR - Urine, Urine, Random Void; Future  -     HIV-1/O/2 Ag/Ab w Reflex; Future  -     Hepatitis panel, acute; Future    5. Screening PSA (prostate specific antigen)  -     PSA SCREENING; Future      Healthy eating habits. Low saturated fats. High plant based. Avoid processed foods. 15-30 min of cardiovascular exercise 5 days per week with atleast 2-3 days of resistance training.       Return in about 1 year (around 3/28/2024), or if symptoms worsen or fail to improve, for Annual physical.

## 2023-03-29 LAB
ALBUMIN SERPL-MCNC: 4.8 G/DL (ref 3.5–5.2)
ALBUMIN/GLOB SERPL: 1.4 G/DL
ALP SERPL-CCNC: 100 U/L (ref 39–117)
ALT SERPL W P-5'-P-CCNC: 20 U/L (ref 1–41)
ANION GAP SERPL CALCULATED.3IONS-SCNC: 15 MMOL/L (ref 5–15)
AST SERPL-CCNC: 21 U/L (ref 1–40)
BASOPHILS # BLD AUTO: 0.03 10*3/MM3 (ref 0–0.2)
BASOPHILS NFR BLD AUTO: 0.4 % (ref 0–1.5)
BILIRUB SERPL-MCNC: 0.8 MG/DL (ref 0–1.2)
BUN SERPL-MCNC: 14 MG/DL (ref 6–20)
BUN/CREAT SERPL: 13.1 (ref 7–25)
CALCIUM SPEC-SCNC: 10 MG/DL (ref 8.6–10.5)
CHLORIDE SERPL-SCNC: 99 MMOL/L (ref 98–107)
CHOLEST SERPL-MCNC: 179 MG/DL (ref 0–200)
CO2 SERPL-SCNC: 27 MMOL/L (ref 22–29)
CREAT SERPL-MCNC: 1.07 MG/DL (ref 0.76–1.27)
DEPRECATED RDW RBC AUTO: 42.4 FL (ref 37–54)
EGFRCR SERPLBLD CKD-EPI 2021: 80.4 ML/MIN/1.73
EOSINOPHIL # BLD AUTO: 0.25 10*3/MM3 (ref 0–0.4)
EOSINOPHIL NFR BLD AUTO: 3.3 % (ref 0.3–6.2)
ERYTHROCYTE [DISTWIDTH] IN BLOOD BY AUTOMATED COUNT: 13.2 % (ref 12.3–15.4)
GLOBULIN UR ELPH-MCNC: 3.5 GM/DL
GLUCOSE SERPL-MCNC: 83 MG/DL (ref 65–99)
HAV IGM SERPL QL IA: NORMAL
HBA1C MFR BLD: 5.8 % (ref 4.8–5.6)
HBV CORE IGM SERPL QL IA: NORMAL
HBV SURFACE AG SERPL QL IA: NORMAL
HCT VFR BLD AUTO: 45.4 % (ref 37.5–51)
HCV AB SER DONR QL: NORMAL
HDLC SERPL-MCNC: 28 MG/DL (ref 40–60)
HGB BLD-MCNC: 15.2 G/DL (ref 13–17.7)
HIV1+2 AB SER QL: NORMAL
IMM GRANULOCYTES # BLD AUTO: 0.01 10*3/MM3 (ref 0–0.05)
IMM GRANULOCYTES NFR BLD AUTO: 0.1 % (ref 0–0.5)
LDLC SERPL CALC-MCNC: 108 MG/DL (ref 0–100)
LDLC/HDLC SERPL: 3.61 {RATIO}
LYMPHOCYTES # BLD AUTO: 3.08 10*3/MM3 (ref 0.7–3.1)
LYMPHOCYTES NFR BLD AUTO: 41.2 % (ref 19.6–45.3)
MCH RBC QN AUTO: 30 PG (ref 26.6–33)
MCHC RBC AUTO-ENTMCNC: 33.5 G/DL (ref 31.5–35.7)
MCV RBC AUTO: 89.7 FL (ref 79–97)
MONOCYTES # BLD AUTO: 0.62 10*3/MM3 (ref 0.1–0.9)
MONOCYTES NFR BLD AUTO: 8.3 % (ref 5–12)
NEUTROPHILS NFR BLD AUTO: 3.48 10*3/MM3 (ref 1.7–7)
NEUTROPHILS NFR BLD AUTO: 46.7 % (ref 42.7–76)
NRBC BLD AUTO-RTO: 0 /100 WBC (ref 0–0.2)
PLATELET # BLD AUTO: 309 10*3/MM3 (ref 140–450)
PMV BLD AUTO: 9.8 FL (ref 6–12)
POTASSIUM SERPL-SCNC: 3.8 MMOL/L (ref 3.5–5.2)
PROT SERPL-MCNC: 8.3 G/DL (ref 6–8.5)
PSA SERPL-MCNC: 0.61 NG/ML (ref 0–4)
RBC # BLD AUTO: 5.06 10*6/MM3 (ref 4.14–5.8)
SODIUM SERPL-SCNC: 141 MMOL/L (ref 136–145)
TRIGL SERPL-MCNC: 249 MG/DL (ref 0–150)
VLDLC SERPL-MCNC: 43 MG/DL (ref 5–40)
WBC NRBC COR # BLD: 7.47 10*3/MM3 (ref 3.4–10.8)

## 2023-04-02 DIAGNOSIS — I10 PRIMARY HYPERTENSION: ICD-10-CM

## 2023-04-02 RX ORDER — AMLODIPINE BESYLATE 10 MG/1
10 TABLET ORAL
Qty: 90 TABLET | Refills: 4 | Status: SHIPPED | OUTPATIENT
Start: 2023-04-02 | End: 2023-04-03 | Stop reason: SDUPTHER

## 2023-04-03 DIAGNOSIS — E78.5 HYPERLIPIDEMIA, UNSPECIFIED HYPERLIPIDEMIA TYPE: ICD-10-CM

## 2023-04-03 DIAGNOSIS — I10 PRIMARY HYPERTENSION: ICD-10-CM

## 2023-04-03 RX ORDER — ATORVASTATIN CALCIUM 20 MG/1
20 TABLET, FILM COATED ORAL
Qty: 90 TABLET | Refills: 3 | Status: SHIPPED | OUTPATIENT
Start: 2023-04-03

## 2023-04-03 RX ORDER — AMLODIPINE BESYLATE 10 MG/1
10 TABLET ORAL
Qty: 90 TABLET | Refills: 4 | Status: SHIPPED | OUTPATIENT
Start: 2023-04-03

## 2023-10-28 PROCEDURE — 87086 URINE CULTURE/COLONY COUNT: CPT | Performed by: PHYSICIAN ASSISTANT

## 2023-11-05 DIAGNOSIS — L60.9 NAIL ABNORMALITY: Primary | ICD-10-CM

## 2023-12-07 DIAGNOSIS — I10 PRIMARY HYPERTENSION: ICD-10-CM

## 2023-12-07 DIAGNOSIS — E78.5 HYPERLIPIDEMIA, UNSPECIFIED HYPERLIPIDEMIA TYPE: ICD-10-CM

## 2023-12-08 RX ORDER — ATORVASTATIN CALCIUM 20 MG/1
20 TABLET, FILM COATED ORAL
Qty: 90 TABLET | Refills: 1 | Status: SHIPPED | OUTPATIENT
Start: 2023-12-08

## 2023-12-08 RX ORDER — AMLODIPINE BESYLATE 10 MG/1
10 TABLET ORAL
Qty: 90 TABLET | Refills: 1 | Status: SHIPPED | OUTPATIENT
Start: 2023-12-08

## 2024-05-09 ENCOUNTER — OFFICE VISIT (OUTPATIENT)
Dept: FAMILY MEDICINE CLINIC | Facility: CLINIC | Age: 59
End: 2024-05-09
Payer: COMMERCIAL

## 2024-05-09 ENCOUNTER — LAB (OUTPATIENT)
Dept: FAMILY MEDICINE CLINIC | Facility: CLINIC | Age: 59
End: 2024-05-09
Payer: COMMERCIAL

## 2024-05-09 VITALS
DIASTOLIC BLOOD PRESSURE: 88 MMHG | HEART RATE: 71 BPM | OXYGEN SATURATION: 100 % | WEIGHT: 254.8 LBS | BODY MASS INDEX: 31.68 KG/M2 | HEIGHT: 75 IN | RESPIRATION RATE: 20 BRPM | SYSTOLIC BLOOD PRESSURE: 136 MMHG

## 2024-05-09 DIAGNOSIS — Z00.00 ANNUAL PHYSICAL EXAM: Primary | ICD-10-CM

## 2024-05-09 DIAGNOSIS — Z12.5 SCREENING PSA (PROSTATE SPECIFIC ANTIGEN): ICD-10-CM

## 2024-05-09 DIAGNOSIS — E78.5 HYPERLIPIDEMIA, UNSPECIFIED HYPERLIPIDEMIA TYPE: ICD-10-CM

## 2024-05-09 DIAGNOSIS — M20.42 HAMMER TOES OF BOTH FEET: ICD-10-CM

## 2024-05-09 DIAGNOSIS — M20.41 HAMMER TOES OF BOTH FEET: ICD-10-CM

## 2024-05-09 DIAGNOSIS — I10 ESSENTIAL HYPERTENSION: ICD-10-CM

## 2024-05-09 PROCEDURE — 83036 HEMOGLOBIN GLYCOSYLATED A1C: CPT | Performed by: FAMILY MEDICINE

## 2024-05-09 PROCEDURE — 84443 ASSAY THYROID STIM HORMONE: CPT | Performed by: FAMILY MEDICINE

## 2024-05-09 PROCEDURE — 82306 VITAMIN D 25 HYDROXY: CPT | Performed by: FAMILY MEDICINE

## 2024-05-09 PROCEDURE — 84439 ASSAY OF FREE THYROXINE: CPT | Performed by: FAMILY MEDICINE

## 2024-05-09 PROCEDURE — G0103 PSA SCREENING: HCPCS | Performed by: FAMILY MEDICINE

## 2024-05-09 PROCEDURE — 85025 COMPLETE CBC W/AUTO DIFF WBC: CPT | Performed by: FAMILY MEDICINE

## 2024-05-09 PROCEDURE — 99396 PREV VISIT EST AGE 40-64: CPT | Performed by: FAMILY MEDICINE

## 2024-05-09 PROCEDURE — 80053 COMPREHEN METABOLIC PANEL: CPT | Performed by: FAMILY MEDICINE

## 2024-05-09 PROCEDURE — 82607 VITAMIN B-12: CPT | Performed by: FAMILY MEDICINE

## 2024-05-09 PROCEDURE — 80061 LIPID PANEL: CPT | Performed by: FAMILY MEDICINE

## 2024-05-09 PROCEDURE — 36415 COLL VENOUS BLD VENIPUNCTURE: CPT | Performed by: FAMILY MEDICINE

## 2024-05-10 LAB
25(OH)D3 SERPL-MCNC: 38.5 NG/ML (ref 30–100)
ALBUMIN SERPL-MCNC: 4 G/DL (ref 3.5–5.2)
ALBUMIN/GLOB SERPL: 1.1 G/DL
ALP SERPL-CCNC: 109 U/L (ref 39–117)
ALT SERPL W P-5'-P-CCNC: 48 U/L (ref 1–41)
ANION GAP SERPL CALCULATED.3IONS-SCNC: 19.4 MMOL/L (ref 5–15)
AST SERPL-CCNC: 26 U/L (ref 1–40)
BASOPHILS # BLD AUTO: 0.05 10*3/MM3 (ref 0–0.2)
BASOPHILS NFR BLD AUTO: 0.6 % (ref 0–1.5)
BILIRUB SERPL-MCNC: 0.7 MG/DL (ref 0–1.2)
BUN SERPL-MCNC: 20 MG/DL (ref 6–20)
BUN/CREAT SERPL: 17.1 (ref 7–25)
CALCIUM SPEC-SCNC: 9.3 MG/DL (ref 8.6–10.5)
CHLORIDE SERPL-SCNC: 99 MMOL/L (ref 98–107)
CHOLEST SERPL-MCNC: 160 MG/DL (ref 0–200)
CO2 SERPL-SCNC: 20.6 MMOL/L (ref 22–29)
CREAT SERPL-MCNC: 1.17 MG/DL (ref 0.76–1.27)
DEPRECATED RDW RBC AUTO: 40.6 FL (ref 37–54)
EGFRCR SERPLBLD CKD-EPI 2021: 71.8 ML/MIN/1.73
EOSINOPHIL # BLD AUTO: 0.22 10*3/MM3 (ref 0–0.4)
EOSINOPHIL NFR BLD AUTO: 2.8 % (ref 0.3–6.2)
ERYTHROCYTE [DISTWIDTH] IN BLOOD BY AUTOMATED COUNT: 12.3 % (ref 12.3–15.4)
GLOBULIN UR ELPH-MCNC: 3.6 GM/DL
GLUCOSE SERPL-MCNC: 65 MG/DL (ref 65–99)
HBA1C MFR BLD: 5.5 % (ref 4.8–5.6)
HCT VFR BLD AUTO: 41.9 % (ref 37.5–51)
HDLC SERPL-MCNC: 24 MG/DL (ref 40–60)
HGB BLD-MCNC: 13.8 G/DL (ref 13–17.7)
IMM GRANULOCYTES # BLD AUTO: 0.02 10*3/MM3 (ref 0–0.05)
IMM GRANULOCYTES NFR BLD AUTO: 0.3 % (ref 0–0.5)
LDLC SERPL CALC-MCNC: 104 MG/DL (ref 0–100)
LDLC/HDLC SERPL: 4.15 {RATIO}
LYMPHOCYTES # BLD AUTO: 3.41 10*3/MM3 (ref 0.7–3.1)
LYMPHOCYTES NFR BLD AUTO: 43.4 % (ref 19.6–45.3)
MCH RBC QN AUTO: 30 PG (ref 26.6–33)
MCHC RBC AUTO-ENTMCNC: 32.9 G/DL (ref 31.5–35.7)
MCV RBC AUTO: 91.1 FL (ref 79–97)
MONOCYTES # BLD AUTO: 0.73 10*3/MM3 (ref 0.1–0.9)
MONOCYTES NFR BLD AUTO: 9.3 % (ref 5–12)
NEUTROPHILS NFR BLD AUTO: 3.43 10*3/MM3 (ref 1.7–7)
NEUTROPHILS NFR BLD AUTO: 43.6 % (ref 42.7–76)
NRBC BLD AUTO-RTO: 0 /100 WBC (ref 0–0.2)
PLATELET # BLD AUTO: 384 10*3/MM3 (ref 140–450)
PMV BLD AUTO: 9.8 FL (ref 6–12)
POTASSIUM SERPL-SCNC: 4 MMOL/L (ref 3.5–5.2)
PROT SERPL-MCNC: 7.6 G/DL (ref 6–8.5)
PSA SERPL-MCNC: 0.53 NG/ML (ref 0–4)
RBC # BLD AUTO: 4.6 10*6/MM3 (ref 4.14–5.8)
SODIUM SERPL-SCNC: 139 MMOL/L (ref 136–145)
T4 FREE SERPL-MCNC: 1.04 NG/DL (ref 0.93–1.7)
TRIGL SERPL-MCNC: 182 MG/DL (ref 0–150)
TSH SERPL DL<=0.05 MIU/L-ACNC: 1.38 UIU/ML (ref 0.27–4.2)
VIT B12 BLD-MCNC: 450 PG/ML (ref 211–946)
VLDLC SERPL-MCNC: 32 MG/DL (ref 5–40)
WBC NRBC COR # BLD AUTO: 7.86 10*3/MM3 (ref 3.4–10.8)

## 2024-06-27 ENCOUNTER — OFFICE VISIT (OUTPATIENT)
Dept: PODIATRY | Facility: CLINIC | Age: 59
End: 2024-06-27
Payer: COMMERCIAL

## 2024-06-27 VITALS
OXYGEN SATURATION: 96 % | HEIGHT: 75 IN | RESPIRATION RATE: 20 BRPM | WEIGHT: 254 LBS | BODY MASS INDEX: 31.58 KG/M2 | HEART RATE: 62 BPM

## 2024-06-27 DIAGNOSIS — M20.42 HAMMER TOES OF BOTH FEET: ICD-10-CM

## 2024-06-27 DIAGNOSIS — M21.862 ACQUIRED POSTERIOR EQUINUS OF BOTH LOWER EXTREMITIES: ICD-10-CM

## 2024-06-27 DIAGNOSIS — M79.671 BILATERAL FOOT PAIN: Primary | ICD-10-CM

## 2024-06-27 DIAGNOSIS — M21.622 TAILOR'S BUNION OF BOTH FEET: ICD-10-CM

## 2024-06-27 DIAGNOSIS — M21.861 ACQUIRED POSTERIOR EQUINUS OF BOTH LOWER EXTREMITIES: ICD-10-CM

## 2024-06-27 DIAGNOSIS — M21.621 TAILOR'S BUNION OF BOTH FEET: ICD-10-CM

## 2024-06-27 DIAGNOSIS — M79.672 BILATERAL FOOT PAIN: Primary | ICD-10-CM

## 2024-06-27 DIAGNOSIS — M20.41 HAMMER TOES OF BOTH FEET: ICD-10-CM

## 2024-06-28 NOTE — PROGRESS NOTES
06/27/2024  Foot and Ankle Surgery - New Patient   Provider: Dr. Billy Corbin DPM  Location: [facility]    Subjective:  Ulisses Rivera is a 58 y.o. male.     Chief Complaint   Patient presents with    Right Foot - Hammer Toe, Initial Evaluation    Left Foot - Hammer Toe, Initial Evaluation    Initial Evaluation     LETA Agosto md 04/25/2024       History of Present Illness  The patient presents for evaluation of hammertoes.    The patient reports experiencing hammertoes in his three middle toes, which do not cause him any discomfort unless he wears shoes. He has not sought medical attention for these symptoms in the past. His occupation involves prolonged periods of sitting and extensive walking.       Allergies   Allergen Reactions    Nuts Nausea And Vomiting    Peanut-Containing Drug Products Anaphylaxis    Peanut Oil Other (See Comments)     Vomiting and an ill feeling       Past Medical History:   Diagnosis Date    Allergic When I was 1    Only seasonal allergies and allergic to some foods    Gout 2005    Only rarely    Hammer toe 2018    Hyperlipidemia     Hypertension        Past Surgical History:   Procedure Laterality Date    COLONOSCOPY         Family History   Problem Relation Age of Onset    Macular degeneration Mother     Heart attack Father 83    Heart disease Father     Skin cancer Sister     No Known Problems Maternal Grandmother     No Known Problems Maternal Grandfather     No Known Problems Paternal Grandmother     Cancer Paternal Grandfather        Social History     Socioeconomic History    Marital status: Single   Tobacco Use    Smoking status: Never     Passive exposure: Never    Smokeless tobacco: Never   Vaping Use    Vaping status: Never Used   Substance and Sexual Activity    Alcohol use: Yes     Alcohol/week: 1.0 standard drink of alcohol     Types: 1 Cans of beer per week     Comment: occ    Drug use: Never    Sexual activity: Yes     Partners: Female     Birth  "control/protection: None        Current Outpatient Medications on File Prior to Visit   Medication Sig Dispense Refill    amLODIPine (NORVASC) 10 MG tablet Take 1 tablet by mouth every night at bedtime. 90 tablet 1    atorvastatin (LIPITOR) 20 MG tablet Take 1 tablet by mouth every night at bedtime. 90 tablet 1    multivitamin with minerals tablet tablet Take 1 tablet by mouth Daily.       No current facility-administered medications on file prior to visit.         Objective   Pulse 62   Resp 20   Ht 190.5 cm (75\")   Wt 115 kg (254 lb)   SpO2 96%   BMI 31.75 kg/m²     Foot/Ankle Exam    GENERAL  Appearance:  appears stated age  Orientation:  AAOx3  Affect:  appropriate    VASCULAR     Right Foot Vascularity   Normal vascular exam    Dorsalis pedis:  2+  Posterior tibial:  2+  Skin temperature:  warm  Edema grading:  None  CFT:  < 3 seconds  Pedal hair growth:  Present  Varicosities:  none     Left Foot Vascularity   Normal vascular exam    Dorsalis pedis:  2+  Posterior tibial:  2+  Skin temperature:  warm  Edema grading:  None  CFT:  < 3 seconds  Pedal hair growth:  Present  Varicosities:  none     NEUROLOGIC     Right Foot Neurologic   Light touch sensation: normal  Hot/Cold sensation: normal  Achilles reflex:  2+     Left Foot Neurologic   Light touch sensation: normal  Hot/Cold sensation:  normal  Achilles reflex:  2+    MUSCULOSKELETAL     Right Foot Musculoskeletal   Ecchymosis:  none  Tenderness:  none    Arch:  Pes cavus  Hammertoe:  Fourth toe, third toe, second toe and fifth toe  Tailor's bunion: Yes       Left Foot Musculoskeletal   Ecchymosis:  none  Tenderness:  none  Arch:  Pes cavus  Hammertoe:  Second toe, third toe, fourth toe and fifth toe  Tailor's bunion: Yes      MUSCLE STRENGTH     Right Foot Muscle Strength   Normal strength    Foot dorsiflexion:  5  Foot plantar flexion:  5  Foot inversion:  5  Foot eversion:  5     Left Foot Muscle Strength   Normal strength    Foot dorsiflexion:  " 5  Foot plantar flexion:  5  Foot inversion:  5  Foot eversion:  5    DERMATOLOGIC      Right Foot Dermatologic   Skin  Right foot skin is intact.   Nails comment:  Nails 1-5     Left Foot Dermatologic   Skin  Left foot skin is intact.   Nails comment:  Nails 1-5    TESTS     Right Foot Tests   Anterior drawer: negative  Varus tilt: negative     Left Foot Tests   Anterior drawer: negative  Varus tilt: negative     Right foot additional comments: No open wounds or signs of infection.  Semirigid hammer digit deformities involving the lesser digits of both feet.  Mild discomfort with palpation involving the plantar lateral aspect of the fifth metatarsophalangeal joint regions.  Moderate equinus contracture with knee extended and flexed.    Physical Exam         Results  Imaging  X-ray of the foot shows no significant arthritis.       Assessment & Plan   Diagnoses and all orders for this visit:    1. Bilateral foot pain (Primary)    2. Hammer toes of both feet  -     XR Foot 3+ View Bilateral    3. Acquired posterior equinus of both lower extremities    4. Tailor's bunion of both feet       Assessment & Plan    The patient presents with a semirigid arch and a tailor's bunion. A regimen of over-the-counter PowerStep inserts has been recommended, to be worn in his tennis shoes. The importance of moisturizing and callus care was emphasized, with a recommendation to avoid walking barefoot, wearing flip flops, and sandals. Stretching exercises for the calf muscle have been recommended.Reviewed proper basic stretching and manual therapy exercises along with appropriate shoes and activity.  Discussed proper use and/or avoidance of OTC anti-inflammatories.  Patient is to call with any additional issues or concerns.  Greater than 30 minutes was spent before, during, and after evaluation for patient care.           Patient or patient representative verbalized consent for the use of Ambient Listening during the visit with  TESS  Billy Corbin DPM for chart documentation. 6/28/2024  09:51 EDT    T Billy Corbin DPM

## 2024-07-26 DIAGNOSIS — R35.0 URINARY FREQUENCY: Primary | ICD-10-CM

## 2024-07-29 ENCOUNTER — LAB (OUTPATIENT)
Dept: FAMILY MEDICINE CLINIC | Facility: CLINIC | Age: 59
End: 2024-07-29
Payer: COMMERCIAL

## 2024-07-29 LAB
BACTERIA UR QL AUTO: ABNORMAL /HPF
BILIRUB UR QL STRIP: ABNORMAL
CLARITY UR: ABNORMAL
COD CRY URNS QL: PRESENT /HPF
COLOR UR: ABNORMAL
GLUCOSE UR STRIP-MCNC: NEGATIVE MG/DL
GRAN CASTS URNS QL MICRO: PRESENT /LPF
HGB UR QL STRIP.AUTO: ABNORMAL
HOLD SPECIMEN: NORMAL
HYALINE CASTS UR QL AUTO: ABNORMAL /LPF
KETONES UR QL STRIP: NEGATIVE
LEUKOCYTE ESTERASE UR QL STRIP.AUTO: ABNORMAL
NITRITE UR QL STRIP: POSITIVE
PH UR STRIP.AUTO: 6 [PH] (ref 5–8)
PROT UR QL STRIP: ABNORMAL
RBC # UR STRIP: ABNORMAL /HPF
REF LAB TEST METHOD: ABNORMAL
SP GR UR STRIP: 1.02 (ref 1–1.03)
SQUAMOUS #/AREA URNS HPF: ABNORMAL /HPF
UROBILINOGEN UR QL STRIP: ABNORMAL
WBC # UR STRIP: ABNORMAL /HPF

## 2024-07-29 PROCEDURE — 81001 URINALYSIS AUTO W/SCOPE: CPT | Performed by: FAMILY MEDICINE

## 2024-07-29 PROCEDURE — 87086 URINE CULTURE/COLONY COUNT: CPT | Performed by: FAMILY MEDICINE

## 2024-07-30 LAB — BACTERIA SPEC AEROBE CULT: NO GROWTH

## 2024-07-30 RX ORDER — SULFAMETHOXAZOLE AND TRIMETHOPRIM 800; 160 MG/1; MG/1
1 TABLET ORAL 2 TIMES DAILY
Qty: 14 TABLET | Refills: 0 | Status: SHIPPED | OUTPATIENT
Start: 2024-07-30 | End: 2024-08-01 | Stop reason: SDUPTHER

## 2024-07-31 DIAGNOSIS — R30.0 DYSURIA: Primary | ICD-10-CM

## 2024-07-31 RX ORDER — SULFAMETHOXAZOLE AND TRIMETHOPRIM 800; 160 MG/1; MG/1
1 TABLET ORAL 2 TIMES DAILY
Qty: 14 TABLET | Refills: 0 | Status: CANCELLED | OUTPATIENT
Start: 2024-07-31 | End: 2024-08-07

## 2024-08-01 RX ORDER — SULFAMETHOXAZOLE AND TRIMETHOPRIM 800; 160 MG/1; MG/1
1 TABLET ORAL 2 TIMES DAILY
Qty: 14 TABLET | Refills: 0 | Status: SHIPPED | OUTPATIENT
Start: 2024-08-01 | End: 2024-08-08

## 2024-08-01 RX ORDER — SULFAMETHOXAZOLE AND TRIMETHOPRIM 800; 160 MG/1; MG/1
1 TABLET ORAL 2 TIMES DAILY
Qty: 14 TABLET | Refills: 0 | Status: SHIPPED | OUTPATIENT
Start: 2024-08-01 | End: 2024-08-01 | Stop reason: SDUPTHER

## 2024-08-11 ENCOUNTER — APPOINTMENT (OUTPATIENT)
Dept: CT IMAGING | Facility: HOSPITAL | Age: 59
DRG: 445 | End: 2024-08-11
Payer: COMMERCIAL

## 2024-08-11 ENCOUNTER — HOSPITAL ENCOUNTER (INPATIENT)
Facility: HOSPITAL | Age: 59
LOS: 2 days | Discharge: HOME | DRG: 445 | End: 2024-08-13
Attending: EMERGENCY MEDICINE | Admitting: STUDENT IN AN ORGANIZED HEALTH CARE EDUCATION/TRAINING PROGRAM
Payer: COMMERCIAL

## 2024-08-11 DIAGNOSIS — R17 JAUNDICE: ICD-10-CM

## 2024-08-11 DIAGNOSIS — K83.1 COMMON BILE DUCT OBSTRUCTION: Primary | ICD-10-CM

## 2024-08-11 DIAGNOSIS — R93.5 ABNORMAL CT OF THE ABDOMEN: ICD-10-CM

## 2024-08-11 PROBLEM — K80.50 CHOLEDOCHOLITHIASIS: Status: ACTIVE | Noted: 2024-08-11

## 2024-08-11 LAB
ALBUMIN SERPL-MCNC: 3.3 G/DL (ref 3.5–5.2)
ALBUMIN/GLOB SERPL: 0.9 G/DL
ALP SERPL-CCNC: 587 U/L (ref 39–117)
ALT SERPL W P-5'-P-CCNC: 206 U/L (ref 1–41)
ANION GAP SERPL CALCULATED.3IONS-SCNC: 6.2 MMOL/L (ref 5–15)
AST SERPL-CCNC: 222 U/L (ref 1–40)
BACTERIA UR QL AUTO: ABNORMAL /HPF
BASOPHILS # BLD AUTO: 0.02 10*3/MM3 (ref 0–0.2)
BASOPHILS NFR BLD AUTO: 0.2 % (ref 0–1.5)
BILIRUB SERPL-MCNC: 12.8 MG/DL (ref 0–1.2)
BILIRUB UR QL STRIP: ABNORMAL
BUN SERPL-MCNC: 19 MG/DL (ref 6–20)
BUN/CREAT SERPL: 26.8 (ref 7–25)
CALCIUM SPEC-SCNC: 9.3 MG/DL (ref 8.6–10.5)
CHLORIDE SERPL-SCNC: 103 MMOL/L (ref 98–107)
CLARITY UR: ABNORMAL
CO2 SERPL-SCNC: 24.8 MMOL/L (ref 22–29)
COLOR UR: ABNORMAL
CREAT SERPL-MCNC: 0.71 MG/DL (ref 0.76–1.27)
DEPRECATED RDW RBC AUTO: 61.6 FL (ref 37–54)
EGFRCR SERPLBLD CKD-EPI 2021: 105.7 ML/MIN/1.73
EOSINOPHIL # BLD AUTO: 0.17 10*3/MM3 (ref 0–0.4)
EOSINOPHIL NFR BLD AUTO: 1.9 % (ref 0.3–6.2)
ERYTHROCYTE [DISTWIDTH] IN BLOOD BY AUTOMATED COUNT: 18.2 % (ref 12.3–15.4)
GLOBULIN UR ELPH-MCNC: 3.8 GM/DL
GLUCOSE SERPL-MCNC: 105 MG/DL (ref 65–99)
GLUCOSE UR STRIP-MCNC: ABNORMAL MG/DL
HAV IGM SERPL QL IA: NORMAL
HBV CORE IGM SERPL QL IA: NORMAL
HBV SURFACE AG SERPL QL IA: NORMAL
HCT VFR BLD AUTO: 41 % (ref 37.5–51)
HCV AB SER QL: NORMAL
HGB BLD-MCNC: 13 G/DL (ref 13–17.7)
HGB UR QL STRIP.AUTO: ABNORMAL
HYALINE CASTS UR QL AUTO: ABNORMAL /LPF
IMM GRANULOCYTES # BLD AUTO: 0.03 10*3/MM3 (ref 0–0.05)
IMM GRANULOCYTES NFR BLD AUTO: 0.3 % (ref 0–0.5)
KETONES UR QL STRIP: ABNORMAL
LEUKOCYTE ESTERASE UR QL STRIP.AUTO: NEGATIVE
LIPASE SERPL-CCNC: 32 U/L (ref 13–60)
LYMPHOCYTES # BLD AUTO: 2.82 10*3/MM3 (ref 0.7–3.1)
LYMPHOCYTES NFR BLD AUTO: 31.7 % (ref 19.6–45.3)
MCH RBC QN AUTO: 29.1 PG (ref 26.6–33)
MCHC RBC AUTO-ENTMCNC: 31.7 G/DL (ref 31.5–35.7)
MCV RBC AUTO: 91.9 FL (ref 79–97)
MONOCYTES # BLD AUTO: 0.75 10*3/MM3 (ref 0.1–0.9)
MONOCYTES NFR BLD AUTO: 8.4 % (ref 5–12)
NEUTROPHILS NFR BLD AUTO: 5.11 10*3/MM3 (ref 1.7–7)
NEUTROPHILS NFR BLD AUTO: 57.5 % (ref 42.7–76)
NITRITE UR QL STRIP: NEGATIVE
PH UR STRIP.AUTO: 5.5 [PH] (ref 5–8)
PLATELET # BLD AUTO: 433 10*3/MM3 (ref 140–450)
PMV BLD AUTO: 10.2 FL (ref 6–12)
POTASSIUM SERPL-SCNC: 3.8 MMOL/L (ref 3.5–5.2)
PROT SERPL-MCNC: 7.1 G/DL (ref 6–8.5)
PROT UR QL STRIP: ABNORMAL
RBC # BLD AUTO: 4.46 10*6/MM3 (ref 4.14–5.8)
RBC # UR STRIP: ABNORMAL /HPF
REF LAB TEST METHOD: ABNORMAL
SODIUM SERPL-SCNC: 134 MMOL/L (ref 136–145)
SP GR UR STRIP: 1.02 (ref 1–1.03)
SQUAMOUS #/AREA URNS HPF: ABNORMAL /HPF
UROBILINOGEN UR QL STRIP: ABNORMAL
WBC # UR STRIP: ABNORMAL /HPF
WBC NRBC COR # BLD AUTO: 8.9 10*3/MM3 (ref 3.4–10.8)

## 2024-08-11 PROCEDURE — 80053 COMPREHEN METABOLIC PANEL: CPT

## 2024-08-11 PROCEDURE — 80074 ACUTE HEPATITIS PANEL: CPT | Performed by: PHYSICIAN ASSISTANT

## 2024-08-11 PROCEDURE — 81001 URINALYSIS AUTO W/SCOPE: CPT | Performed by: EMERGENCY MEDICINE

## 2024-08-11 PROCEDURE — 85025 COMPLETE CBC W/AUTO DIFF WBC: CPT

## 2024-08-11 PROCEDURE — 99285 EMERGENCY DEPT VISIT HI MDM: CPT

## 2024-08-11 PROCEDURE — 74177 CT ABD & PELVIS W/CONTRAST: CPT

## 2024-08-11 PROCEDURE — 36415 COLL VENOUS BLD VENIPUNCTURE: CPT

## 2024-08-11 PROCEDURE — 25510000001 IOPAMIDOL PER 1 ML: Performed by: EMERGENCY MEDICINE

## 2024-08-11 PROCEDURE — 83690 ASSAY OF LIPASE: CPT

## 2024-08-11 RX ORDER — AMOXICILLIN 250 MG
2 CAPSULE ORAL 2 TIMES DAILY PRN
Status: DISCONTINUED | OUTPATIENT
Start: 2024-08-11 | End: 2024-08-13 | Stop reason: HOSPADM

## 2024-08-11 RX ORDER — SODIUM CHLORIDE 0.9 % (FLUSH) 0.9 %
10 SYRINGE (ML) INJECTION EVERY 12 HOURS SCHEDULED
Status: DISCONTINUED | OUTPATIENT
Start: 2024-08-11 | End: 2024-08-13 | Stop reason: HOSPADM

## 2024-08-11 RX ORDER — SODIUM CHLORIDE 0.9 % (FLUSH) 0.9 %
10 SYRINGE (ML) INJECTION AS NEEDED
Status: DISCONTINUED | OUTPATIENT
Start: 2024-08-11 | End: 2024-08-13 | Stop reason: HOSPADM

## 2024-08-11 RX ORDER — ENOXAPARIN SODIUM 100 MG/ML
40 INJECTION SUBCUTANEOUS DAILY
Status: DISCONTINUED | OUTPATIENT
Start: 2024-08-12 | End: 2024-08-13 | Stop reason: HOSPADM

## 2024-08-11 RX ORDER — BISACODYL 10 MG
10 SUPPOSITORY, RECTAL RECTAL DAILY PRN
Status: DISCONTINUED | OUTPATIENT
Start: 2024-08-11 | End: 2024-08-13 | Stop reason: HOSPADM

## 2024-08-11 RX ORDER — BISACODYL 5 MG/1
5 TABLET, DELAYED RELEASE ORAL DAILY PRN
Status: DISCONTINUED | OUTPATIENT
Start: 2024-08-11 | End: 2024-08-13 | Stop reason: HOSPADM

## 2024-08-11 RX ORDER — POLYETHYLENE GLYCOL 3350 17 G/17G
17 POWDER, FOR SOLUTION ORAL DAILY PRN
Status: DISCONTINUED | OUTPATIENT
Start: 2024-08-11 | End: 2024-08-13 | Stop reason: HOSPADM

## 2024-08-11 RX ORDER — SODIUM CHLORIDE 9 MG/ML
75 INJECTION, SOLUTION INTRAVENOUS CONTINUOUS
Status: DISCONTINUED | OUTPATIENT
Start: 2024-08-11 | End: 2024-08-13 | Stop reason: HOSPADM

## 2024-08-11 RX ORDER — SODIUM CHLORIDE 9 MG/ML
40 INJECTION, SOLUTION INTRAVENOUS AS NEEDED
Status: DISCONTINUED | OUTPATIENT
Start: 2024-08-11 | End: 2024-08-13 | Stop reason: HOSPADM

## 2024-08-11 RX ADMIN — IOPAMIDOL 100 ML: 755 INJECTION, SOLUTION INTRAVENOUS at 16:41

## 2024-08-11 NOTE — [facility] PROVIDER NOTE
EMERGENCY DEPARTMENT ENCOUNTER    Room Number:  1108/1  Date seen:  8/11/2024  Time seen: 15:56 EDT  PCP: Ulisses Agosto MD  Historian: Patient    Discussed/obtained information from independent historians: N/A    HPI:  Chief complaint: Upper abdominal pain and jaundice  A complete HPI/ROS/PMH/PSH/SH/FH are unobtainable due to: Nothing  Context:Ulisses Rivera is a 58 y.o. male having a past medical history of BPH who presents to the ED with c/o upper abdominal pain and jaundice.  Patient reports jaundice was first noticed 2 days ago.  Patient reports he has recently been on a sulfa antibiotic for UTI.  No statins or any other new medications.  He reports he has had some mild upper abdominal pain has been intermittent for a while.  No fever and chills.  Currently he is in no pain.  He denies EtOH use.  No chest pain, shortness of breath.  Patient does report dark-colored urine over the course of the past week.  There has been no white/light-colored stools or bloody bowel movements.      No past medical history of malignancy.  Patient does have his gallbladder.  He is here for further evaluation.    External (non-ED) record review: Patient has limited records in epic.  I can see where he is followed by Dr. Mcclure.  Last seen 5/9/2024.  Noted he has hypertension and dyslipidemia.  Labs were obtained and medications refilled at this visit.  Patient was on atorvastatin at this time.      Chronic or social conditions impacting care:    ALLERGIES  Nuts, Peanut-containing drug products, and Peanut oil    PAST MEDICAL HISTORY  Active Ambulatory Problems     Diagnosis Date Noted    Essential hypertension     Hyperlipidemia     Skin tag 04/26/2021    Multiple nevi 04/26/2021    Atypical nevus of neck 04/26/2021    Screening PSA (prostate specific antigen) 03/28/2023    Dysuria 03/28/2023     Resolved Ambulatory Problems     Diagnosis Date Noted    No Resolved Ambulatory Problems     Past Medical History:    Diagnosis Date    Allergic When I was 1    Gout 2005    Hammer toe 2018    Hypertension        PAST SURGICAL HISTORY  Past Surgical History:   Procedure Laterality Date    COLONOSCOPY         FAMILY HISTORY  Family History   Problem Relation Age of Onset    Macular degeneration Mother     Heart attack Father 83    Heart disease Father     Skin cancer Sister     No Known Problems Maternal Grandmother     No Known Problems Maternal Grandfather     No Known Problems Paternal Grandmother     Cancer Paternal Grandfather        SOCIAL HISTORY  Social History     Socioeconomic History    Marital status: Single   Tobacco Use    Smoking status: Never     Passive exposure: Never    Smokeless tobacco: Never   Vaping Use    Vaping status: Never Used   Substance and Sexual Activity    Alcohol use: Yes     Alcohol/week: 1.0 standard drink of alcohol     Types: 1 Cans of beer per week     Comment: occ    Drug use: Never    Sexual activity: Yes     Partners: Female     Birth control/protection: None       REVIEW OF SYSTEMS  Review of Systems    All systems reviewed and negative except for those discussed in HPI.     PHYSICAL EXAM    I have reviewed the triage vital signs and nursing notes.  Vitals:    08/11/24 2020   BP: 127/55   Pulse: 72   Resp: 16   Temp: 97.1 °F (36.2 °C)   SpO2: 100%     Physical Exam    GENERAL: Nontoxic, not distressed  HENT: nares patent  EYES: no scleral icterus  NECK: no ROM limitations  CV: regular rhythm, regular rate  RESPIRATORY: normal effort  ABDOMEN: soft, no mass, no guarding, no rebound noted  : deferred  MUSCULOSKELETAL: no deformity  NEURO: alert, moves all extremities, follows commands  SKIN: warm, dry    LAB RESULTS  Recent Results (from the past 24 hour(s))   Comprehensive Metabolic Panel    Collection Time: 08/11/24  2:49 PM    Specimen: Blood   Result Value Ref Range    Glucose 105 (H) 65 - 99 mg/dL    BUN 19 6 - 20 mg/dL    Creatinine 0.71 (L) 0.76 - 1.27 mg/dL    Sodium 134 (L) 136 -  145 mmol/L    Potassium 3.8 3.5 - 5.2 mmol/L    Chloride 103 98 - 107 mmol/L    CO2 24.8 22.0 - 29.0 mmol/L    Calcium 9.3 8.6 - 10.5 mg/dL    Total Protein 7.1 6.0 - 8.5 g/dL    Albumin 3.3 (L) 3.5 - 5.2 g/dL    ALT (SGPT) 206 (H) 1 - 41 U/L    AST (SGOT) 222 (H) 1 - 40 U/L    Alkaline Phosphatase 587 (H) 39 - 117 U/L    Total Bilirubin 12.8 (H) 0.0 - 1.2 mg/dL    Globulin 3.8 gm/dL    A/G Ratio 0.9 g/dL    BUN/Creatinine Ratio 26.8 (H) 7.0 - 25.0    Anion Gap 6.2 5.0 - 15.0 mmol/L    eGFR 105.7 >60.0 mL/min/1.73   Lipase    Collection Time: 08/11/24  2:49 PM    Specimen: Blood   Result Value Ref Range    Lipase 32 13 - 60 U/L   CBC Auto Differential    Collection Time: 08/11/24  2:49 PM    Specimen: Blood   Result Value Ref Range    WBC 8.90 3.40 - 10.80 10*3/mm3    RBC 4.46 4.14 - 5.80 10*6/mm3    Hemoglobin 13.0 13.0 - 17.7 g/dL    Hematocrit 41.0 37.5 - 51.0 %    MCV 91.9 79.0 - 97.0 fL    MCH 29.1 26.6 - 33.0 pg    MCHC 31.7 31.5 - 35.7 g/dL    RDW 18.2 (H) 12.3 - 15.4 %    RDW-SD 61.6 (H) 37.0 - 54.0 fl    MPV 10.2 6.0 - 12.0 fL    Platelets 433 140 - 450 10*3/mm3    Neutrophil % 57.5 42.7 - 76.0 %    Lymphocyte % 31.7 19.6 - 45.3 %    Monocyte % 8.4 5.0 - 12.0 %    Eosinophil % 1.9 0.3 - 6.2 %    Basophil % 0.2 0.0 - 1.5 %    Immature Grans % 0.3 0.0 - 0.5 %    Neutrophils, Absolute 5.11 1.70 - 7.00 10*3/mm3    Lymphocytes, Absolute 2.82 0.70 - 3.10 10*3/mm3    Monocytes, Absolute 0.75 0.10 - 0.90 10*3/mm3    Eosinophils, Absolute 0.17 0.00 - 0.40 10*3/mm3    Basophils, Absolute 0.02 0.00 - 0.20 10*3/mm3    Immature Grans, Absolute 0.03 0.00 - 0.05 10*3/mm3   Urinalysis With Culture If Indicated - Urine, Clean Catch    Collection Time: 08/11/24  3:10 PM    Specimen: Urine, Clean Catch   Result Value Ref Range    Color, UA Brown (A) Yellow, Straw    Appearance, UA Turbid (A) Clear    pH, UA 5.5 5.0 - 8.0    Specific Gravity, UA 1.025 1.005 - 1.030    Glucose,  mg/dL (Trace) (A) Negative    Ketones, UA  Trace (A) Negative    Bilirubin, UA Large (3+) (A) Negative    Blood, UA Moderate (2+) (A) Negative    Protein, UA 30 mg/dL (1+) (A) Negative    Leuk Esterase, UA Negative Negative    Nitrite, UA Negative Negative    Urobilinogen, UA 1.0 E.U./dL 0.2 - 1.0 E.U./dL   Urinalysis, Microscopic Only - Urine, Clean Catch    Collection Time: 08/11/24  3:10 PM    Specimen: Urine, Clean Catch   Result Value Ref Range    RBC, UA 6-10 (A) None Seen, 0-2 /HPF    WBC, UA 0-2 None Seen, 0-2 /HPF    Bacteria, UA None Seen None Seen /HPF    Squamous Epithelial Cells, UA 0-2 None Seen, 0-2 /HPF    Hyaline Casts, UA 0-2 None Seen /LPF    Methodology Automated Microscopy    Hepatitis Panel, Acute    Collection Time: 08/11/24  4:20 PM    Specimen: Blood   Result Value Ref Range    Hepatitis B Surface Ag Non-Reactive Non-Reactive    Hep A IgM Non-Reactive Non-Reactive    Hep B C IgM Non-Reactive Non-Reactive    Hepatitis C Ab Non-Reactive Non-Reactive       Ordered the above labs and independently interpreted results.  My findings will be discussed in the ED course or medical decision making section below    RADIOLOGY RESULTS  CT Abdomen Pelvis With Contrast    Result Date: 8/11/2024  CT ABDOMEN PELVIS W CONTRAST Date of Exam: 8/11/2024 4:30 PM EDT Indication: Jaundice and upper abdominal pain.. Comparison: None available. Technique: Axial CT images were obtained of the abdomen and pelvis following the uneventful intravenous administration of iodinated contrast. Sagittal and coronal reconstructions were performed.  Automated exposure control and iterative reconstruction methods were used. Findings: Visualized Chest:  The visualized lung bases and lower mediastinal structures are unremarkable. Liver: Liver is normal in size and CT density. No focal lesions. Gallbladder: Significantly distended gallbladder. Possible small layering stones noted within the gallbladder lumen. Trace pericholecystic fluid. Bile Ducts: Severe distention of the  common bile duct measuring up to 2.1 cm. There is also extensive intrahepatic biliary ductal dilation. There is evidence of a possible obstructing lesion noted within the mid to distal common bile duct measuring up to  1.7 x 1.6 cm (image 48 of series 4). More distally the common bile duct appears to be patent (for example image 57 of series 4) Spleen: Spleen is normal in size and CT density. Pancreas: No pancreatic ductal dilation. No evidence of acute inflammatory changes. Adrenals: Adrenal glands are unremarkable. Kidneys: Kidneys are normal in size. There are no stones or hydronephrosis. Gastrointestinal: Mild wall thickening of the sigmoid colon is most likely due to underdistention. Colonic diverticulosis without evidence of acute diverticulitis. Otherwise unremarkable. Normal appendix. Bladder: The bladder is normal. Pelvis:  No suspecious mass. Peritoneum/Mesentery: No fluid collection, ascities, or free air.   Lymph Nodes: Multiple prominent to mildly enlarged upper para-aortic and hilary hepatis lymph nodes. The largest of these measures 1.3 cm on the short axis. Vasculature: Unremarkable Abdominal Wall: Unremarkable Bony Structures: No acute osseous abnormality     Impression: Significant distention of the intrahepatic and extrahepatic bile ducts secondary to a possible obstructing lesion noted within the mid to distal common bile duct measuring 1.7 cm. Differentials include choledocholithiasis, primary biliary malignancy versus pancreatic malignancy. Recommend follow-up with dedicated MRI pancreatic mass protocol with and without contrast with dedicated MRCP images. Multiple prominent to mildly enlarged upper para-aortic and hilary hepatis lymph nodes, nonspecific. There is evidence of developing acute cholecystitis secondary to biliary duct obstruction. Electronically Signed: Alexsander Antony DO  8/11/2024 4:51 PM EDT  Workstation ID: [id number]      Ordered the above noted radiological studies.   Independently interpreted by me.  My findings will be discussed in the medical decision section below.     PROGRESS, DATA ANALYSIS, CONSULTS AND MEDICAL DECISION MAKING    Please note that this section constitutes my independent interpretation of clinical data including lab results, radiology, EKG's.  This constitutes my independent professional opinion regarding differential diagnosis and management of this patient.  It may include any factors such as history from outside sources, review of external records, social determinants of health, management of medications, response to those treatments, and discussions with other providers.    ED Course as of 08/11/24 2312   Sun Aug 11, 2024   1558 ALT (SGPT)(!): 206 [RC]   1558 AST (SGOT)(!): 222 [RC]   1558 Alkaline Phosphatase(!): 587 [RC]   1558 Total Bilirubin(!): 12.8 [RC]   1732 Discussed patient's case with general surgery/Dr. Coombs.  GEN coronel will see the patient in consult [RC]   1734 Discussed the patient's case with on-call gastroenterology.  They recommend MRCP with and without contrast to further differentiate the stone versus mass.  They to will see in consultation. [RC]   1735 Discussed patient's case with [facility] with [facility].  To admit to Dr. Wall's care in the [facility] bed. [RC]      ED Course User Index  [RC] Gio Roman III, PA     Orders placed during this visit:  Orders Placed This Encounter   Procedures    CT Abdomen Pelvis With Contrast    MRI abdomen w wo contrast mrcp    Urinalysis With Culture If Indicated - Urine, Clean Catch    Comprehensive Metabolic Panel    Lipase    CBC Auto Differential    Urinalysis, Microscopic Only - Urine, Clean Catch    [place] Urine Culture Tube -    Hepatitis Panel, Acute    Comprehensive Metabolic Panel    CBC Auto Differential    Magnesium    Phosphorus    NPO Diet NPO Type: Strict NPO    Vital Signs    Intake & Output    Weigh Patient    Oral Care    Saline Lock & Maintain IV Access     Continuous Pulse Oximetry    Hospitalist (on-call MD unless specified)    Surgery (on-call MD unless specified)    Gastroenterology (on-call MD unless specified)    Insert Peripheral IV    Inpatient Admission    Inpatient Admission    CBC & Differential    ED Acknowledgement Form Needed;            Medical Decision Making  Problems Addressed:  Abnormal CT of the abdomen-mass versus CBD stone: complicated acute illness or injury  Common bile duct obstruction: complicated acute illness or injury  Jaundice: complicated acute illness or injury    Amount and/or Complexity of Data Reviewed  Labs: ordered. Decision-making details documented in ED Course.  Radiology: ordered.    Risk  Prescription drug management.  Decision regarding hospitalization.    DDx: Choledocholithiasis, pancreatic cancer, liver cancer, biliary tree obstruction hemolytic anemia.        DIAGNOSIS  Final diagnoses:   Common bile duct obstruction   Jaundice   Abnormal CT of the abdomen-mass versus CBD stone          Medication List      No changes were made to your prescriptions during this visit.         FOLLOW-UP  No follow-up provider specified.      Latest Documented Vital Signs:  As of 23:12 EDT  BP- 127/55 HR- 72 Temp- 97.1 °F (36.2 °C) (Oral) O2 sat- 100%    Appropriate PPE utilized throughout this patient encounter to include mask, if indicated, per current protocol. Hand hygiene was performed before donning PPE and after removal when leaving the room.    Please note that portions of this were completed with a voice recognition program.     Note Disclaimer: At [facility], we believe that sharing information builds trust and better relationships. You are receiving this note because you are receiving care at [facility] or recently visited. It is possible you will see health information before a provider has talked with you about it. This kind of information can be easy to misunderstand. To help you fully understand what it means for your  health, we urge you to discuss this note with your provider.              General Sunscreen Counseling: I recommended a broad spectrum sunscreen with a SPF of 30 or higher.  I explained that SPF 30-50 sunscreens block approximately 97 percent of the sun's harmful rays.  Sunscreens should be applied at least 15 minutes prior to expected sun exposure and then every 2 hours after that as long as sun exposure continues. If swimming or exercising sunscreen should be reapplied every 45 minutes to an hour after getting wet or sweating.  One ounce, or the equivalent of a shot glass full of sunscreen, is adequate to protect the skin not covered by a bathing suit. I also recommended a lip balm with a sunscreen as well. Sun protective clothing can be used in lieu of sunscreen but must be worn the entire time you are exposed to the sun's rays. Detail Level: Detailed Products Recommended: SPF 50 tinted mineral sunscreen Q2 hours

## 2024-08-12 ENCOUNTER — APPOINTMENT (OUTPATIENT)
Dept: MRI IMAGING | Facility: HOSPITAL | Age: 59
DRG: 445 | End: 2024-08-12
Payer: COMMERCIAL

## 2024-08-12 ENCOUNTER — INPATIENT HOSPITAL (AMBULATORY)
Dept: URBAN - METROPOLITAN AREA HOSPITAL 84 | Facility: HOSPITAL | Age: 59
End: 2024-08-12
Payer: COMMERCIAL

## 2024-08-12 DIAGNOSIS — R17 UNSPECIFIED JAUNDICE: ICD-10-CM

## 2024-08-12 PROBLEM — R93.5 ABNORMAL CT OF THE ABDOMEN: Status: ACTIVE | Noted: 2024-08-11

## 2024-08-12 LAB
ALBUMIN SERPL-MCNC: 3.1 G/DL (ref 3.5–5.2)
ALBUMIN/GLOB SERPL: 0.8 G/DL
ALP SERPL-CCNC: 531 U/L (ref 39–117)
ALT SERPL W P-5'-P-CCNC: 201 U/L (ref 1–41)
ANION GAP SERPL CALCULATED.3IONS-SCNC: 12.1 MMOL/L (ref 5–15)
AST SERPL-CCNC: 218 U/L (ref 1–40)
BASOPHILS # BLD AUTO: 0.06 10*3/MM3 (ref 0–0.2)
BASOPHILS NFR BLD AUTO: 0.6 % (ref 0–1.5)
BILIRUB SERPL-MCNC: 11 MG/DL (ref 0–1.2)
BUN SERPL-MCNC: 19 MG/DL (ref 6–20)
BUN/CREAT SERPL: 20.4 (ref 7–25)
CALCIUM SPEC-SCNC: 9 MG/DL (ref 8.6–10.5)
CANCER AG19-9 SERPL-ACNC: 73.1 U/ML
CHLORIDE SERPL-SCNC: 102 MMOL/L (ref 98–107)
CO2 SERPL-SCNC: 20.9 MMOL/L (ref 22–29)
CREAT SERPL-MCNC: 0.93 MG/DL (ref 0.76–1.27)
DEPRECATED RDW RBC AUTO: 62.2 FL (ref 37–54)
EGFRCR SERPLBLD CKD-EPI 2021: 94.6 ML/MIN/1.73
EOSINOPHIL # BLD AUTO: 0.3 10*3/MM3 (ref 0–0.4)
EOSINOPHIL NFR BLD AUTO: 2.8 % (ref 0.3–6.2)
ERYTHROCYTE [DISTWIDTH] IN BLOOD BY AUTOMATED COUNT: 18.8 % (ref 12.3–15.4)
GLOBULIN UR ELPH-MCNC: 3.7 GM/DL
GLUCOSE SERPL-MCNC: 79 MG/DL (ref 65–99)
HCT VFR BLD AUTO: 34.7 % (ref 37.5–51)
HGB BLD-MCNC: 11.5 G/DL (ref 13–17.7)
IMM GRANULOCYTES # BLD AUTO: 0.05 10*3/MM3 (ref 0–0.05)
IMM GRANULOCYTES NFR BLD AUTO: 0.5 % (ref 0–0.5)
LYMPHOCYTES # BLD AUTO: 3.35 10*3/MM3 (ref 0.7–3.1)
LYMPHOCYTES NFR BLD AUTO: 31.4 % (ref 19.6–45.3)
MAGNESIUM SERPL-MCNC: 1.9 MG/DL (ref 1.6–2.6)
MCH RBC QN AUTO: 30 PG (ref 26.6–33)
MCHC RBC AUTO-ENTMCNC: 33.1 G/DL (ref 31.5–35.7)
MCV RBC AUTO: 90.6 FL (ref 79–97)
MONOCYTES # BLD AUTO: 0.94 10*3/MM3 (ref 0.1–0.9)
MONOCYTES NFR BLD AUTO: 8.8 % (ref 5–12)
NEUTROPHILS NFR BLD AUTO: 5.98 10*3/MM3 (ref 1.7–7)
NEUTROPHILS NFR BLD AUTO: 55.9 % (ref 42.7–76)
NRBC BLD AUTO-RTO: 0 /100 WBC (ref 0–0.2)
PHOSPHATE SERPL-MCNC: 3.4 MG/DL (ref 2.5–4.5)
PLATELET # BLD AUTO: 378 10*3/MM3 (ref 140–450)
PMV BLD AUTO: 10.6 FL (ref 6–12)
POTASSIUM SERPL-SCNC: 4.1 MMOL/L (ref 3.5–5.2)
PROT SERPL-MCNC: 6.8 G/DL (ref 6–8.5)
RBC # BLD AUTO: 3.83 10*6/MM3 (ref 4.14–5.8)
SODIUM SERPL-SCNC: 135 MMOL/L (ref 136–145)
WBC NRBC COR # BLD AUTO: 10.68 10*3/MM3 (ref 3.4–10.8)

## 2024-08-12 PROCEDURE — 74183 MRI ABD W/O CNTR FLWD CNTR: CPT

## 2024-08-12 PROCEDURE — 84100 ASSAY OF PHOSPHORUS: CPT | Performed by: STUDENT IN AN ORGANIZED HEALTH CARE EDUCATION/TRAINING PROGRAM

## 2024-08-12 PROCEDURE — 80053 COMPREHEN METABOLIC PANEL: CPT | Performed by: STUDENT IN AN ORGANIZED HEALTH CARE EDUCATION/TRAINING PROGRAM

## 2024-08-12 PROCEDURE — 83735 ASSAY OF MAGNESIUM: CPT | Performed by: STUDENT IN AN ORGANIZED HEALTH CARE EDUCATION/TRAINING PROGRAM

## 2024-08-12 PROCEDURE — 25810000003 SODIUM CHLORIDE 0.9 % SOLUTION: Performed by: STUDENT IN AN ORGANIZED HEALTH CARE EDUCATION/TRAINING PROGRAM

## 2024-08-12 PROCEDURE — 86301 IMMUNOASSAY TUMOR CA 19-9: CPT | Performed by: INTERNAL MEDICINE

## 2024-08-12 PROCEDURE — 99222 1ST HOSP IP/OBS MODERATE 55: CPT | Performed by: NURSE PRACTITIONER

## 2024-08-12 PROCEDURE — 99222 1ST HOSP IP/OBS MODERATE 55: CPT | Performed by: SURGERY

## 2024-08-12 PROCEDURE — 25010000002 ENOXAPARIN PER 10 MG: Performed by: STUDENT IN AN ORGANIZED HEALTH CARE EDUCATION/TRAINING PROGRAM

## 2024-08-12 PROCEDURE — 25010000002 GADOTERIDOL PER 1 ML: Performed by: INTERNAL MEDICINE

## 2024-08-12 PROCEDURE — 85025 COMPLETE CBC W/AUTO DIFF WBC: CPT | Performed by: STUDENT IN AN ORGANIZED HEALTH CARE EDUCATION/TRAINING PROGRAM

## 2024-08-12 PROCEDURE — A9579 GAD-BASE MR CONTRAST NOS,1ML: HCPCS | Performed by: INTERNAL MEDICINE

## 2024-08-12 RX ORDER — MULTIPLE VITAMINS W/ MINERALS TAB 9MG-400MCG
1 TAB ORAL DAILY
Status: DISCONTINUED | OUTPATIENT
Start: 2024-08-12 | End: 2024-08-13 | Stop reason: HOSPADM

## 2024-08-12 RX ORDER — AMLODIPINE BESYLATE 5 MG/1
5 TABLET ORAL
Status: DISCONTINUED | OUTPATIENT
Start: 2024-08-12 | End: 2024-08-13 | Stop reason: HOSPADM

## 2024-08-12 RX ADMIN — SODIUM CHLORIDE 75 ML/HR: 9 INJECTION, SOLUTION INTRAVENOUS at 00:25

## 2024-08-12 RX ADMIN — AMLODIPINE BESYLATE 5 MG: 5 TABLET ORAL at 11:17

## 2024-08-12 RX ADMIN — Medication 10 ML: at 09:39

## 2024-08-12 RX ADMIN — ENOXAPARIN SODIUM 40 MG: 100 INJECTION SUBCUTANEOUS at 15:26

## 2024-08-12 RX ADMIN — SODIUM CHLORIDE 75 ML/HR: 9 INJECTION, SOLUTION INTRAVENOUS at 18:10

## 2024-08-12 RX ADMIN — Medication 10 ML: at 20:39

## 2024-08-12 RX ADMIN — Medication 1 TABLET: at 14:19

## 2024-08-12 RX ADMIN — GADOTERIDOL 20 ML: 279.3 INJECTION, SOLUTION INTRAVENOUS at 10:23

## 2024-08-12 NOTE — PLAN OF CARE
Goal Outcome Evaluation:              Outcome Evaluation: 58M A&Ox4,VSS. No complaints of pain. MRI completed. GI & surg consulted. ERCP planned for 8/13. Consent signed. Educated about NPO at midnight. Will continue to monitor.

## 2024-08-12 NOTE — CONSULTS
GI CONSULT  NOTE:    Referring Provider:  Dr. Krishnan    Chief complaint: Right upper quadrant pain and jaundice    Subjective .     History of present illness: Ulisses Rivera is a 58 y.o. male with past medical history of BPH, hypertension.  Who presents complaining of right upper quadrant pain, jaundice and urine discoloration.  Patient states that he has had an unintentional weight loss of around 20 pounds, acholic stool, right upper quadrant pain, and that recently at a  of a friend told him that he looked yellow.  He was treated for a UTI recently with trimethoprim/sulfamethoxazole and UTI symptoms are improving.  Patient had already been informed that imaging studies of the liver are suspicious for biliary versus pancreatic malignancy.      Endo History:  2022 EGD with Dr. Bello- Normal mucosa in the terminal ileum, Internal hemorrhoids, Mild diverticulosis of the descending colon and sigmoid colon, Otherwise normal mucosa in the whole colon.    Past Medical History:  Past Medical History:   Diagnosis Date    Allergic When I was 1    Only seasonal allergies and allergic to some foods    Gout 2005    Only rarely    Hammer toe 2018    Hyperlipidemia     Hypertension        Past Surgical History:  Past Surgical History:   Procedure Laterality Date    COLONOSCOPY         Social History:  Social History     Tobacco Use    Smoking status: Never     Passive exposure: Never    Smokeless tobacco: Never   Vaping Use    Vaping status: Never Used   Substance Use Topics    Alcohol use: Yes     Alcohol/week: 1.0 standard drink of alcohol     Types: 1 Cans of beer per week     Comment: occ    Drug use: Never       Family History:  Family History   Problem Relation Age of Onset    Macular degeneration Mother     Heart attack Father 83    Heart disease Father     Skin cancer Sister     No Known Problems Maternal Grandmother     No Known Problems Maternal Grandfather     No Known Problems Paternal Grandmother      Cancer Paternal Grandfather        Medications:  Medications Prior to Admission   Medication Sig Dispense Refill Last Dose    amLODIPine (NORVASC) 10 MG tablet Take 1 tablet by mouth every night at bedtime. 90 tablet 1 8/11/2024    atorvastatin (LIPITOR) 20 MG tablet Take 1 tablet by mouth every night at bedtime. 90 tablet 1 8/11/2024    multivitamin with minerals tablet tablet Take 1 tablet by mouth Daily.   8/11/2024       Scheduled Meds:amLODIPine, 5 mg, Oral, Q24H  enoxaparin, 40 mg, Subcutaneous, Daily  multivitamin with minerals, 1 tablet, Oral, Daily  sodium chloride, 10 mL, Intravenous, Q12H      Continuous Infusions:sodium chloride, 75 mL/hr, Last Rate: 75 mL/hr (08/12/24 0025)      PRN Meds:.  senna-docusate sodium **AND** polyethylene glycol **AND** bisacodyl **AND** bisacodyl    Calcium Replacement - Follow Nurse / BPA Driven Protocol    Magnesium Standard Dose Replacement - Follow Nurse / BPA Driven Protocol    Phosphorus Replacement - Follow Nurse / BPA Driven Protocol    Potassium Replacement - Follow Nurse / BPA Driven Protocol    sodium chloride    sodium chloride    ALLERGIES:  Nuts, Peanut-containing drug products, and Peanut oil    ROS:  The following systems were reviewed   Constitution:  No fevers, chills, +20 pound unintentional weight loss  Skin: no rash, + jaundice  Eyes:  No blurry vision, no eye pain  HENT:  No change in hearing or smell  Resp:  No dyspnea or cough  CV:  No chest pain or palpitations  : + dysuria, hematuria  GI: + Right upper quadrant pain  Musculoskeletal:  No leg cramps or arthralgias  Neuro:  No tremor, no numbness  Psych:  No depression or confusion    Objective patient is resting in bed with 2 family members present in the room    Vital Signs:   Vitals:    08/12/24 0400 08/12/24 0814 08/12/24 1117 08/12/24 1158   BP: 126/84 137/85 126/83 128/88   BP Location: Right arm Right arm  Right arm   Patient Position: Lying Lying  Lying   Pulse: 82 54 56 55   Resp: 16 15  13  "  Temp: 98.2 °F (36.8 °C) 98.5 °F (36.9 °C)  98 °F (36.7 °C)   TempSrc: Oral Oral  Oral   SpO2: 100%      Weight:       Height:           Physical Exam:       General Appearance:    Awake and alert, in no acute distress   Head:    Normocephalic, without obvious abnormality, atraumatic       Lungs:     Respirations regular, even and unlabored   Chest Wall:    No abnormalities observed   Abdomen:     Soft, tender right upper quadrant, no rebound or guarding, non-distended   Rectal:     Deferred   Extremities:   Moves all extremities, no edema, no cyanosis   Pulses:   Pulses palpable and equal bilaterally   Skin:   No rash, + jaundice, normal palpation       Neurologic:   Cranial nerves 2 - 12 grossly intact       Results Review:   I reviewed the patient's labs and imaging.  CBC    Results from last 7 days   Lab Units 08/12/24  0427 08/11/24  1449   WBC 10*3/mm3 10.68 8.90   HEMOGLOBIN g/dL 11.5* 13.0   PLATELETS 10*3/mm3 378 433     CMP   Results from last 7 days   Lab Units 08/12/24  0427 08/11/24  1449   SODIUM mmol/L 135* 134*   POTASSIUM mmol/L 4.1 3.8   CHLORIDE mmol/L 102 103   CO2 mmol/L 20.9* 24.8   BUN mg/dL 19 19   CREATININE mg/dL 0.93 0.71*   GLUCOSE mg/dL 79 105*   ALBUMIN g/dL 3.1* 3.3*   BILIRUBIN mg/dL 11.0* 12.8*   ALK PHOS U/L 531* 587*   AST (SGOT) U/L 218* 222*   ALT (SGPT) U/L 201* 206*   MAGNESIUM mg/dL 1.9  --    PHOSPHORUS mg/dL 3.4  --    LIPASE U/L  --  32     Cr Clearance Estimated Creatinine Clearance: 112.6 mL/min (by C-G formula based on SCr of 0.93 mg/dL).  Coag     HbA1C   Lab Results   Component Value Date    HGBA1C 5.50 05/09/2024    HGBA1C 5.80 (H) 03/28/2023    HGBA1C 5.1 03/09/2022     Blood Glucose No results found for: \"POCGLU\"  Infection     UA    Results from last 7 days   Lab Units 08/11/24  1510   NITRITE UA  Negative   WBC UA /HPF 0-2   BACTERIA UA /HPF None Seen   SQUAM EPITHEL UA /HPF 0-2     Imaging Results (Last 72 Hours)       Procedure Component Value Units Date/Time "    MRI abdomen w wo contrast mrcp [[id number]] Collected: 08/12/24 1033     Updated: 08/12/24 1044    Narrative:      MRI ABDOMEN W WO CONTRAST MRCP    Date of Exam: 8/12/2024 10:00 AM EDT    Indication: R/O pancreatic malignancy.     Comparison: CT abdomen and pelvis 8/9/2024.    Technique:  Routine multiplanar/multisequence images of the abdomen were obtained with MRCP sequences before and after the uneventful administration of 20 cc Prohance.      Findings:  1.5 x 1.9 x 1.9 cm (transverse by AP by craniocaudal) enhancing mass is demonstrated within the lower CBD above the level of the ampulla, at the level of the pancreatic head. There is severe upstream common bile duct dilation up to 2.2 cm. There is   severe diffuse intrahepatic bile duct dilation, with index left hepatic duct measuring 9 mm.    A couple of tiny 2 to 3 mm gallstones are noted within the gallbladder lumen. The gallbladder does not appear inflamed, and is only mildly distended up to 3.5 cm transversely.    The pancreatic parenchyma demonstrates normal homogeneous enhancement. No peripancreatic inflammatory changes are identified, and there is no significant upstream pancreatic parenchymal atrophy.    The spleen, adrenals are normal. Tiny bilateral renal cortical cysts are present, one of the largest in the right anterior upper pole measuring 10 mm.    No pathologically enlarged lymph nodes are identified. A hilary hepatis node measures 8 mm short axis.    The portal vein, SMV, splenic vein, IVC, and hepatic veins are patent.    No gross ascites. Abdominal aortic caliber is normal. The included lung bases are clear, and the heart size appears within normal limits.      Impression:      Impression:    1. 1.5 x 1.9 x 1.9 cm enhancing soft tissue mass within the lower CBD, with upstream biliary obstruction and dilation. The findings are most worrisome for primary cholangiocarcinoma. Pancreatic malignancy is thought to represent a secondary    consideration. Consider ERCP for diagnostic and therapeutic purposes.  2. Couple of small gallstones are present within the gallbladder lumen.      Electronically Signed: Carole Robledo MD    8/12/2024 10:42 AM EDT    Workstation ID: [id number]    CT Abdomen Pelvis With Contrast [[id number]] Collected: 08/11/24 1644     Updated: 08/11/24 1653    Narrative:      CT ABDOMEN PELVIS W CONTRAST    Date of Exam: 8/11/2024 4:30 PM EDT    Indication: Jaundice and upper abdominal pain..    Comparison: None available.    Technique: Axial CT images were obtained of the abdomen and pelvis following the uneventful intravenous administration of iodinated contrast. Sagittal and coronal reconstructions were performed.  Automated exposure control and iterative reconstruction   methods were used.        Findings:  Visualized Chest:  The visualized lung bases and lower mediastinal structures are unremarkable.    Liver: Liver is normal in size and CT density. No focal lesions.    Gallbladder: Significantly distended gallbladder. Possible small layering stones noted within the gallbladder lumen. Trace pericholecystic fluid.    Bile Ducts: Severe distention of the common bile duct measuring up to 2.1 cm. There is also extensive intrahepatic biliary ductal dilation. There is evidence of a possible obstructing lesion noted within the mid to distal common bile duct measuring up to   1.7 x 1.6 cm (image 48 of series 4). More distally the common bile duct appears to be patent (for example image 57 of series 4)    Spleen: Spleen is normal in size and CT density.    Pancreas: No pancreatic ductal dilation. No evidence of acute inflammatory changes.    Adrenals: Adrenal glands are unremarkable.    Kidneys: Kidneys are normal in size. There are no stones or hydronephrosis.    Gastrointestinal: Mild wall thickening of the sigmoid colon is most likely due to underdistention. Colonic diverticulosis without evidence of acute diverticulitis.  Otherwise  unremarkable. Normal appendix.    Bladder: The bladder is normal.    Pelvis:  No suspecious mass.    Peritoneum/Mesentery: No fluid collection, ascities, or free air.      Lymph Nodes: Multiple prominent to mildly enlarged upper para-aortic and hilary hepatis lymph nodes. The largest of these measures 1.3 cm on the short axis.    Vasculature: Unremarkable    Abdominal Wall: Unremarkable    Bony Structures: No acute osseous abnormality      Impression:      Impression:  Significant distention of the intrahepatic and extrahepatic bile ducts secondary to a possible obstructing lesion noted within the mid to distal common bile duct measuring 1.7 cm. Differentials include choledocholithiasis, primary biliary malignancy   versus pancreatic malignancy. Recommend follow-up with dedicated MRI pancreatic mass protocol with and without contrast with dedicated MRCP images.    Multiple prominent to mildly enlarged upper para-aortic and hilary hepatis lymph nodes, nonspecific.    There is evidence of developing acute cholecystitis secondary to biliary duct obstruction.            Electronically Signed: Alexsander Antony DO    8/11/2024 4:51 PM EDT    Workstation ID: [id number]            ASSESSMENT:  Obstructive jaundice  Abnormal CT of the abdomen  Electrolyte abnormalities  Normocytic anemia       PLAN:  This is a 58-year-old male patient who presented to the ER on 8/11/2024 complaining of right upper quadrant pain, jaundice, urine discoloration.  Patient was treated recently for a UTI, and upon further workup CT abdomen pelvis with contrast showed significant distention of the intrahepatic and extrahepatic bile duct secondary to a possible obstructing lesion noted within the mid to distal common bile duct measuring 1.7 cm.  Differentials include choledocholithiasis, primary biliary malignancy versus pancreatic malignancy.  Multiple prominent to mildly enlarged upper para-aortic and hilary hepatis lymph nodes.  Evidence of  developing acute cholecystitis secondary to biliary duct obstruction.  MRCP showed 1.5 x 1.9 x 1 0.9 cm enhancing soft tissue mass within the lower common bile duct, with upstream biliary obstruction and dilation.  The findings are most worrisome for primary cholangiocarcinoma.  Pancreatic malignancy is thought to represent a secondary consideration.  Also showed small gallstones within the gallbladder lumen.    Alk phos 531, total bilirubin 11, , , sodium 135, lipase 32, hepatitis panel nonreactive, negative for HIV screening.    Plan ERCP for diagnostic and therapeutic purposes tomorrow.  Regular diet and n.p.o. after midnight  Supportive care  Check AFP tumor marker  Check cancer antigen 19-9  Continue to monitor LFTs    I discussed the patients findings and my recommendations with the patient.    We appreciate the referral    Electronically signed by LIDIA Messina, 08/12/24, 2:44 PM EDT.

## 2024-08-12 NOTE — PLAN OF CARE
Goal Outcome Evaluation:      Pt received from [facility], no pain, no sob. Jaundice skin/eyes. Nondistended/soft abdoman. Urinary frequency of dark concentrated urine, no discomfort voiding. Vss. Ax4, up adlib to toilet. Call light in reach, can make own needs known.

## 2024-08-12 NOTE — ACP (ADVANCE CARE PLANNING)
General education/discussion of AD/Living Will. Patient reports he has already completed AD/POA. Reports he will bring them next time in order to apply to chart. No other needs at this time.    Chaplain Royce Levy

## 2024-08-12 NOTE — CASE MANAGEMENT/SOCIAL WORK
Discharge Planning Assessment   [place]     Patient Name: Ulisses Rivera  MRN: [id number]  Today's Date: 8/12/2024    Admit Date: 8/11/2024    Plan: Plan to return home alone.   Discharge Needs Assessment       Row Name 08/12/24 1416       Living Environment    People in Home alone    Current Living Arrangements home    Potentially Unsafe Housing Conditions none    In the past 12 months has the electric, gas, oil, or water company threatened to shut off services in your home? No    Primary Care Provided by self    Provides Primary Care For no one    Family Caregiver if Needed parent(s)    Family Caregiver Names Vo - mother    Quality of Family Relationships helpful;involved;supportive    Able to Return to Prior Arrangements yes       Resource/Environmental Concerns    Resource/Environmental Concerns none    Transportation Concerns none       Transportation Needs    In the past 12 months, has lack of transportation kept you from medical appointments or from getting medications? no    In the past 12 months, has lack of transportation kept you from meetings, work, or from getting things needed for daily living? No       Food Insecurity    Within the past 12 months, you worried that your food would run out before you got the money to buy more. Never true    Within the past 12 months, the food you bought just didn't last and you didn't have money to get more. Never true       Transition Planning    Patient/Family Anticipates Transition to home    Patient/Family Anticipated Services at Transition none    Transportation Anticipated car, drives self;family or friend will provide       Discharge Needs Assessment    Readmission Within the Last 30 Days no previous admission in last 30 days    Equipment Currently Used at Home none    Concerns to be Addressed discharge planning    Anticipated Changes Related to Illness none    Equipment Needed After Discharge none                   Discharge Plan       Row Name 08/12/24 1410        Plan    Plan Plan to return home alone.    Patient/Family in Agreement with Plan yes    Plan Comments CM met with patient at bedside. Patient A&Ox4. Patient lives at home alone. Mother will transport at discharge. Patient performs ADLs. PCP and pharmacy confirmed. Declines M2B.  Denies financial assistance needs for medication and/or food. Denies any current DME, HH, Caregiver, or rehab services.  DC Barriers:  elev LFTs, IVF @ 74, Plan ERCP today 8/12 - possible Biliary Ca work up, GI/Surgery following.               Expected Discharge Date and Time       Expected Discharge Date Expected Discharge Time    Aug 13, 2024            Demographic Summary       Row Name 08/12/24 1416       General Information    Admission Type inpatient    Arrived From emergency department    Referral Source admission list    Reason for Consult discharge planning    Preferred Language English                   Functional Status       Row Name 08/12/24 1416       Functional Status    Usual Activity Tolerance excellent    Current Activity Tolerance moderate       Functional Status, IADL    Medications independent    Meal Preparation independent    Housekeeping independent    Laundry independent    Shopping independent       Mental Status    General Appearance WDL WDL       Mental Status Summary    Recent Changes in Mental Status/Cognitive Functioning no changes           BRAULIO. Yanelis Garvin RN  SIPS/ICU   O: [phone]  C: [phone]  [email or web address]

## 2024-08-12 NOTE — H&P
"[facility] Medicine Services  History & Physical    Patient Name: Ulisses Rivera  : 1965  MRN: [id number]  Primary Care Physician:  Ulisses Agosto MD  Date of admission: 2024  Date and Time of Service: 2024 at 10:10 PM    Subjective      Chief Complaint: \"urine discoloration\"    History of Present Illness: Ulisses Rivera is a 58 y.o. male with a PMH of w hypertension who presented to [facility] on 2024 with choluria for the past 4 weeks of gradual onset associated with unintentional weight loss of around 20 pounds, acholia, intermittent epigastric pain and postprandial fullness.  Patient states he was initially treated for a possible urine infection for which she completed 7 days Bactrim.  He was at a  last Friday and was told by some of his friends that his skin looked yellow.  Reports intermittent nausea but denies vomiting, melena, hematemesis, fever, chills.  Denies smoking or prior history of malignancy.  Patient was initially evaluated at [facility] where his chemistry labs were suggestive of extrahepatic cholestasis with , , T. bili 12.8, alkaline phosphatase 587.  CBC labs grossly unremarkable.  CT abdomen shows significant distention of intra and extrahepatic biliary ducts with possible obstructing lesion in the mid to distal CBD measuring 1.7 cm.  He was subsequently sent here for MRCP and GI evaluation.    Review of Systems   Constitutional:  Positive for appetite change, fatigue and unexpected weight change. Negative for fever.   HENT:  Negative for ear pain and nosebleeds.    Eyes:  Negative for pain.   Respiratory:  Negative for chest tightness and shortness of breath.    Cardiovascular:  Negative for chest pain and leg swelling.   Gastrointestinal:  Positive for abdominal pain. Negative for abdominal distention and nausea.   Endocrine: Negative for polydipsia.   Genitourinary:  Negative for dysuria.   Musculoskeletal:  " Negative for back pain.   Skin:  Positive for color change.   Neurological:  Negative for dizziness.   Psychiatric/Behavioral:  Negative for confusion.        Personal History     Past Medical History:   Diagnosis Date    Allergic When I was 1    Only seasonal allergies and allergic to some foods    Gout 2005    Only rarely    Hammer toe 2018    Hyperlipidemia     Hypertension        Past Surgical History:   Procedure Laterality Date    COLONOSCOPY         Family History: family history includes Cancer in his paternal grandfather; Heart attack (age of onset: 83) in his father; Heart disease in his father; Macular degeneration in his mother; No Known Problems in his maternal grandfather, maternal grandmother, and paternal grandmother; Skin cancer in his sister. Otherwise pertinent FHx was reviewed and not pertinent to current issue.    Social History:  reports that he has never smoked. He has never been exposed to tobacco smoke. He has never used smokeless tobacco. He reports current alcohol use of about 1.0 standard drink of alcohol per week. He reports that he does not use drugs.    Home Medications:  Prior to Admission Medications       Prescriptions Last Dose Informant Patient Reported? Taking?    amLODIPine (NORVASC) 10 MG tablet   No No    Take 1 tablet by mouth every night at bedtime.    atorvastatin (LIPITOR) 20 MG tablet   No No    Take 1 tablet by mouth every night at bedtime.    multivitamin with minerals tablet tablet   Yes No    Take 1 tablet by mouth Daily.              Allergies:  Allergies   Allergen Reactions    Nuts Nausea And Vomiting    Peanut-Containing Drug Products Anaphylaxis    Peanut Oil Other (See Comments)     Vomiting and an ill feeling       Objective      Vitals:   Temp:  [97.1 °F (36.2 °C)-98 °F (36.7 °C)] 97.1 °F (36.2 °C)  Heart Rate:  [60-78] 72  Resp:  [16-18] 16  BP: (127-144)/(55-96) 127/55  Body mass index is 29.17 kg/m².  Physical Exam  Constitutional:       General: He is not  in acute distress.     Appearance: Normal appearance. He is not ill-appearing.   HENT:      Head: Normocephalic.      Nose: Nose normal.      Mouth/Throat:      Mouth: Mucous membranes are moist.   Eyes:      Extraocular Movements: Extraocular movements intact.      Pupils: Pupils are equal, round, and reactive to light.   Cardiovascular:      Rate and Rhythm: Normal rate.      Pulses: Normal pulses.      Heart sounds: Normal heart sounds. No murmur heard.  Pulmonary:      Effort: Pulmonary effort is normal. No respiratory distress.      Breath sounds: Normal breath sounds. No wheezing.   Abdominal:      General: Abdomen is flat. There is no distension.      Palpations: Abdomen is soft.      Tenderness: There is no abdominal tenderness. There is no guarding.      Comments: Abdul sign negative   Musculoskeletal:         General: Normal range of motion.      Cervical back: Normal range of motion.   Skin:     General: Skin is warm.      Coloration: Skin is jaundiced.   Neurological:      General: No focal deficit present.      Mental Status: He is alert and oriented to person, place, and time.   Psychiatric:         Mood and Affect: Mood normal.         Behavior: Behavior normal.         Diagnostic Data:  Lab Results (last 24 hours)       Procedure Component Value Units Date/Time    Hepatitis Panel, Acute [[id number]]  (Normal) Collected: 08/11/24 1620    Specimen: Blood Updated: 08/11/24 2027     Hepatitis B Surface Ag Non-Reactive     Hep A IgM Non-Reactive     Hep B C IgM Non-Reactive     Hepatitis C Ab Non-Reactive    Narrative:      Results may be falsely decreased if patient taking Biotin.     Urinalysis, Microscopic Only - Urine, Clean Catch [[id number]]  (Abnormal) Collected: 08/11/24 1510    Specimen: Urine, Clean Catch Updated: 08/11/24 1952     RBC, UA 6-10 /HPF      WBC, UA 0-2 /HPF      Comment: Urine culture not indicated.        Bacteria, UA None Seen /HPF      Squamous Epithelial Cells, UA 0-2 /HPF       Hyaline Casts, UA 0-2 /LPF      Methodology Automated Microscopy    Urinalysis With Culture If Indicated - Urine, Clean Catch [[id number]]  (Abnormal) Collected: 08/11/24 1510    Specimen: Urine, Clean Catch Updated: 08/11/24 1515     Color, UA Brown     Appearance, UA Turbid     pH, UA 5.5     Specific Gravity, UA 1.025     Glucose,  mg/dL (Trace)     Ketones, UA Trace     Bilirubin, UA Large (3+)     Blood, UA Moderate (2+)     Protein, UA 30 mg/dL (1+)     Leuk Esterase, UA Negative     Nitrite, UA Negative     Urobilinogen, UA 1.0 E.U./dL    Narrative:      In absence of clinical symptoms, the presence of pyuria, bacteria, and/or nitrites on the urinalysis result does not correlate with infection.    [place] Urine Culture Tube - Urine, Clean Catch [[id number]] Collected: 08/11/24 1510    Specimen: Urine, Clean Catch Updated: 08/11/24 1515    Comprehensive Metabolic Panel [[id number]]  (Abnormal) Collected: 08/11/24 1449    Specimen: Blood Updated: 08/11/24 1513     Glucose 105 mg/dL      BUN 19 mg/dL      Creatinine 0.71 mg/dL      Sodium 134 mmol/L      Potassium 3.8 mmol/L      Chloride 103 mmol/L      CO2 24.8 mmol/L      Calcium 9.3 mg/dL      Total Protein 7.1 g/dL      Albumin 3.3 g/dL      ALT (SGPT) 206 U/L      AST (SGOT) 222 U/L      Alkaline Phosphatase 587 U/L      Total Bilirubin 12.8 mg/dL      Globulin 3.8 gm/dL      A/G Ratio 0.9 g/dL      BUN/Creatinine Ratio 26.8     Anion Gap 6.2 mmol/L      eGFR 105.7 mL/min/1.73     Narrative:      GFR Normal >60  Chronic Kidney Disease <60  Kidney Failure <15      Lipase [[id number]]  (Normal) Collected: 08/11/24 1449    Specimen: Blood Updated: 08/11/24 1513     Lipase 32 U/L     CBC & Differential [[id number]]  (Abnormal) Collected: 08/11/24 1449    Specimen: Blood Updated: 08/11/24 1455    Narrative:      The following orders were created for panel order CBC & Differential.  Procedure                               Abnormality         Status                      ---------                               -----------         ------                     CBC Auto Differential[[id number]]        Abnormal            Final result                 Please view results for these tests on the individual orders.    CBC Auto Differential [[id number]]  (Abnormal) Collected: 08/11/24 1449    Specimen: Blood Updated: 08/11/24 1455     WBC 8.90 10*3/mm3      RBC 4.46 10*6/mm3      Hemoglobin 13.0 g/dL      Hematocrit 41.0 %      MCV 91.9 fL      MCH 29.1 pg      MCHC 31.7 g/dL      RDW 18.2 %      RDW-SD 61.6 fl      MPV 10.2 fL      Platelets 433 10*3/mm3      Neutrophil % 57.5 %      Lymphocyte % 31.7 %      Monocyte % 8.4 %      Eosinophil % 1.9 %      Basophil % 0.2 %      Immature Grans % 0.3 %      Neutrophils, Absolute 5.11 10*3/mm3      Lymphocytes, Absolute 2.82 10*3/mm3      Monocytes, Absolute 0.75 10*3/mm3      Eosinophils, Absolute 0.17 10*3/mm3      Basophils, Absolute 0.02 10*3/mm3      Immature Grans, Absolute 0.03 10*3/mm3              Imaging Results (Last 24 Hours)       Procedure Component Value Units Date/Time    CT Abdomen Pelvis With Contrast [[id number]] Collected: 08/11/24 1644     Updated: 08/11/24 1653    Narrative:      CT ABDOMEN PELVIS W CONTRAST    Date of Exam: 8/11/2024 4:30 PM EDT    Indication: Jaundice and upper abdominal pain..    Comparison: None available.    Technique: Axial CT images were obtained of the abdomen and pelvis following the uneventful intravenous administration of iodinated contrast. Sagittal and coronal reconstructions were performed.  Automated exposure control and iterative reconstruction   methods were used.        Findings:  Visualized Chest:  The visualized lung bases and lower mediastinal structures are unremarkable.    Liver: Liver is normal in size and CT density. No focal lesions.    Gallbladder: Significantly distended gallbladder. Possible small layering stones noted within the gallbladder lumen. Trace pericholecystic  fluid.    Bile Ducts: Severe distention of the common bile duct measuring up to 2.1 cm. There is also extensive intrahepatic biliary ductal dilation. There is evidence of a possible obstructing lesion noted within the mid to distal common bile duct measuring up to   1.7 x 1.6 cm (image 48 of series 4). More distally the common bile duct appears to be patent (for example image 57 of series 4)    Spleen: Spleen is normal in size and CT density.    Pancreas: No pancreatic ductal dilation. No evidence of acute inflammatory changes.    Adrenals: Adrenal glands are unremarkable.    Kidneys: Kidneys are normal in size. There are no stones or hydronephrosis.    Gastrointestinal: Mild wall thickening of the sigmoid colon is most likely due to underdistention. Colonic diverticulosis without evidence of acute diverticulitis.  Otherwise unremarkable. Normal appendix.    Bladder: The bladder is normal.    Pelvis:  No suspecious mass.    Peritoneum/Mesentery: No fluid collection, ascities, or free air.      Lymph Nodes: Multiple prominent to mildly enlarged upper para-aortic and hilary hepatis lymph nodes. The largest of these measures 1.3 cm on the short axis.    Vasculature: Unremarkable    Abdominal Wall: Unremarkable    Bony Structures: No acute osseous abnormality      Impression:      Impression:  Significant distention of the intrahepatic and extrahepatic bile ducts secondary to a possible obstructing lesion noted within the mid to distal common bile duct measuring 1.7 cm. Differentials include choledocholithiasis, primary biliary malignancy   versus pancreatic malignancy. Recommend follow-up with dedicated MRI pancreatic mass protocol with and without contrast with dedicated MRCP images.    Multiple prominent to mildly enlarged upper para-aortic and hilary hepatis lymph nodes, nonspecific.    There is evidence of developing acute cholecystitis secondary to biliary duct obstruction.            Electronically Signed: Alexsander  DO Arpan    8/11/2024 4:51 PM EDT    Workstation ID: [id number]              Assessment & Plan        This is a 58 y.o. male with:    Active and Resolved Problems  Active Hospital Problems    Diagnosis  POA    **Common bile duct obstruction [K83.1]  Yes    Choledocholithiasis [K80.50]  Yes      Resolved Hospital Problems   No resolved problems to display.       CBD obstruction, rule out biliary malignancy vs choledocholithiasis   , , T. bili 12.8, alkaline phosphatase 587.  Patient reports ongoing choluria, acholia, postprandial fullness intentional weight loss over the past 4 weeks which raises concern for primary biliary malignancy.  MRCP will be obtained  GI consultation  Will maintain a  n.p.o. after midnight    History of HTN  Resume home dose norvasc      VTE Prophylaxis:  Pharmacologic VTE prophylaxis orders are present.        The patient desires to be as follows:    CODE STATUS:       Full code      Admission Status:  I believe this patient meets inpatient status.    Expected Length of Stay: 2-3    PDMP and Medication Dispenses via Sidebar reviewed and consistent with patient reported medications.    I discussed the patient's findings and my recommendations with patient and nursing staff.      Signature:     This document has been electronically signed by Carlos Llanes Alvarez, MD on August 11, 2024 22:23 EDT   [facility] Hospitalist Team

## 2024-08-12 NOTE — PROGRESS NOTES
"[facility] Medicine Services       Patient Name: Ulisses Rivera  : 1965  MRN: [id number]  Primary Care Physician:  Ulisses Agosto MD  Date of admission: 2024  Date and Time of Service: 2024 at 10:10 PM     Subjective       Chief Complaint: \"urine discoloration\"     History of Present Illness: Ulisses Rivera is a 58 y.o. male with a PMH of w hypertension who presented to [facility] on 2024 with choluria for the past 4 weeks of gradual onset associated with unintentional weight loss of around 20 pounds, acholia, intermittent epigastric pain and postprandial fullness.  Patient states he was initially treated for a possible urine infection for which she completed 7 days Bactrim.  He was at a  last Friday and was told by some of his friends that his skin looked yellow.  Reports intermittent nausea but denies vomiting, melena, hematemesis, fever, chills.  Denies smoking or prior history of malignancy.  Patient was initially evaluated at [facility] where his chemistry labs were suggestive of extrahepatic cholestasis with , , T. bili 12.8, alkaline phosphatase 587.  CBC labs grossly unremarkable.  CT abdomen shows significant distention of intra and extrahepatic biliary ducts with possible obstructing lesion in the mid to distal CBD measuring 1.7 cm.  He was subsequently sent here for MRCP and GI evaluation.     Review of Systems   Constitutional:  Positive for appetite change, fatigue and unexpected weight change. Negative for fever.   HENT:  Negative for ear pain and nosebleeds.    Eyes:  Negative for pain.   Respiratory:  Negative for chest tightness and shortness of breath.    Cardiovascular:  Negative for chest pain and leg swelling.   Gastrointestinal:  Positive for abdominal pain. Negative for abdominal distention and nausea.   Endocrine: Negative for polydipsia.   Genitourinary:  Negative for dysuria.   Musculoskeletal:  Negative for " back pain.   Skin:  Positive for color change.   Neurological:  Negative for dizziness.   Psychiatric/Behavioral:  Negative for confusion.        8/12  Pt admitted with obs jaundice'  Awaitng gi ev  Mrcp pending  Mild abd discomfort  Clinically stable otherwise  Personal History      Medical History        Past Medical History:   Diagnosis Date    Allergic When I was 1     Only seasonal allergies and allergic to some foods    Gout 2005     Only rarely    Hammer toe 2018    Hyperlipidemia      Hypertension              Surgical History         Past Surgical History:   Procedure Laterality Date    COLONOSCOPY                Family History: family history includes Cancer in his paternal grandfather; Heart attack (age of onset: 83) in his father; Heart disease in his father; Macular degeneration in his mother; No Known Problems in his maternal grandfather, maternal grandmother, and paternal grandmother; Skin cancer in his sister. Otherwise pertinent FHx was reviewed and not pertinent to current issue.     Social History:  reports that he has never smoked. He has never been exposed to tobacco smoke. He has never used smokeless tobacco. He reports current alcohol use of about 1.0 standard drink of alcohol per week. He reports that he does not use drugs.     Home Medications:  Prior to Admission Medications         Prescriptions Last Dose Informant Patient Reported? Taking?     amLODIPine (NORVASC) 10 MG tablet     No No     Take 1 tablet by mouth every night at bedtime.     atorvastatin (LIPITOR) 20 MG tablet     No No     Take 1 tablet by mouth every night at bedtime.     multivitamin with minerals tablet tablet     Yes No     Take 1 tablet by mouth Daily.                   Allergies:  Allergies         Allergies   Allergen Reactions    Nuts Nausea And Vomiting    Peanut-Containing Drug Products Anaphylaxis    Peanut Oil Other (See Comments)       Vomiting and an ill feeling            Objective       Vitals:   Temp:   [97.1 °F (36.2 °C)-98 °F (36.7 °C)] 97.1 °F (36.2 °C)  Heart Rate:  [60-78] 72  Resp:  [16-18] 16  BP: (127-144)/(55-96) 127/55  Body mass index is 29.17 kg/m².  Physical Exam  Constitutional:       General: He is not in acute distress.     Appearance: Normal appearance. He is not ill-appearing.   HENT:      Head: Normocephalic.      Nose: Nose normal.      Mouth/Throat:      Mouth: Mucous membranes are moist.   Eyes:      Extraocular Movements: Extraocular movements intact.      Pupils: Pupils are equal, round, and reactive to light.   Cardiovascular:      Rate and Rhythm: Normal rate.      Pulses: Normal pulses.      Heart sounds: Normal heart sounds. No murmur heard.  Pulmonary:      Effort: Pulmonary effort is normal. No respiratory distress.      Breath sounds: Normal breath sounds. No wheezing.   Abdominal:      General: Abdomen is flat. There is no distension.      Palpations: Abdomen is soft.      Tenderness: There is no abdominal tenderness. There is no guarding.      Comments: Abdul sign negative   Musculoskeletal:         General: Normal range of motion.      Cervical back: Normal range of motion.   Skin:     General: Skin is warm.      Coloration: Skin is jaundiced.   Neurological:      General: No focal deficit present.      Mental Status: He is alert and oriented to person, place, and time.   Psychiatric:         Mood and Affect: Mood normal.         Behavior: Behavior normal.            Diagnostic Data:  Lab Results (last 24 hours)         Procedure Component Value Units Date/Time     Hepatitis Panel, Acute [[id number]]  (Normal) Collected: 08/11/24 1620     Specimen: Blood Updated: 08/11/24 2027       Hepatitis B Surface Ag Non-Reactive       Hep A IgM Non-Reactive       Hep B C IgM Non-Reactive       Hepatitis C Ab Non-Reactive     Narrative:       Results may be falsely decreased if patient taking Biotin.      Urinalysis, Microscopic Only - Urine, Clean Catch [[id number]]  (Abnormal) Collected:  08/11/24 1510     Specimen: Urine, Clean Catch Updated: 08/11/24 1952       RBC, UA 6-10 /HPF         WBC, UA 0-2 /HPF         Comment: Urine culture not indicated.          Bacteria, UA None Seen /HPF         Squamous Epithelial Cells, UA 0-2 /HPF         Hyaline Casts, UA 0-2 /LPF         Methodology Automated Microscopy     Urinalysis With Culture If Indicated - Urine, Clean Catch [[id number]]  (Abnormal) Collected: 08/11/24 1510     Specimen: Urine, Clean Catch Updated: 08/11/24 1515       Color, UA Brown       Appearance, UA Turbid       pH, UA 5.5       Specific Gravity, UA 1.025       Glucose,  mg/dL (Trace)       Ketones, UA Trace       Bilirubin, UA Large (3+)       Blood, UA Moderate (2+)       Protein, UA 30 mg/dL (1+)       Leuk Esterase, UA Negative       Nitrite, UA Negative       Urobilinogen, UA 1.0 E.U./dL     Narrative:       In absence of clinical symptoms, the presence of pyuria, bacteria, and/or nitrites on the urinalysis result does not correlate with infection.     [facility] Urine Culture Tube - Urine, Clean Catch [[id number]] Collected: 08/11/24 1510     Specimen: Urine, Clean Catch Updated: 08/11/24 1515     Comprehensive Metabolic Panel [[id number]]  (Abnormal) Collected: 08/11/24 1449     Specimen: Blood Updated: 08/11/24 1513       Glucose 105 mg/dL         BUN 19 mg/dL         Creatinine 0.71 mg/dL         Sodium 134 mmol/L         Potassium 3.8 mmol/L         Chloride 103 mmol/L         CO2 24.8 mmol/L         Calcium 9.3 mg/dL         Total Protein 7.1 g/dL         Albumin 3.3 g/dL         ALT (SGPT) 206 U/L         AST (SGOT) 222 U/L         Alkaline Phosphatase 587 U/L         Total Bilirubin 12.8 mg/dL         Globulin 3.8 gm/dL         A/G Ratio 0.9 g/dL         BUN/Creatinine Ratio 26.8       Anion Gap 6.2 mmol/L         eGFR 105.7 mL/min/1.73       Narrative:       GFR Normal >60  Chronic Kidney Disease <60  Kidney Failure <15        Lipase [[id number]]  (Normal) Collected:  08/11/24 1449     Specimen: Blood Updated: 08/11/24 1513       Lipase 32 U/L       CBC & Differential [[id number]]  (Abnormal) Collected: 08/11/24 1449     Specimen: Blood Updated: 08/11/24 1455     Narrative:       The following orders were created for panel order CBC & Differential.  Procedure                               Abnormality         Status                     ---------                               -----------         ------                     CBC Auto Differential[[id number]]        Abnormal            Final result                  Please view results for these tests on the individual orders.     CBC Auto Differential [[id number]]  (Abnormal) Collected: 08/11/24 1449     Specimen: Blood Updated: 08/11/24 1455       WBC 8.90 10*3/mm3         RBC 4.46 10*6/mm3         Hemoglobin 13.0 g/dL         Hematocrit 41.0 %         MCV 91.9 fL         MCH 29.1 pg         MCHC 31.7 g/dL         RDW 18.2 %         RDW-SD 61.6 fl         MPV 10.2 fL         Platelets 433 10*3/mm3         Neutrophil % 57.5 %         Lymphocyte % 31.7 %         Monocyte % 8.4 %         Eosinophil % 1.9 %         Basophil % 0.2 %         Immature Grans % 0.3 %         Neutrophils, Absolute 5.11 10*3/mm3         Lymphocytes, Absolute 2.82 10*3/mm3         Monocytes, Absolute 0.75 10*3/mm3         Eosinophils, Absolute 0.17 10*3/mm3         Basophils, Absolute 0.02 10*3/mm3         Immature Grans, Absolute 0.03 10*3/mm3                             Imaging Results (Last 24 Hours)         Procedure Component Value Units Date/Time     CT Abdomen Pelvis With Contrast [[id number]] Collected: 08/11/24 1644       Updated: 08/11/24 1653     Narrative:       CT ABDOMEN PELVIS W CONTRAST     Date of Exam: 8/11/2024 4:30 PM EDT     Indication: Jaundice and upper abdominal pain..     Comparison: None available.     Technique: Axial CT images were obtained of the abdomen and pelvis following the uneventful intravenous administration of iodinated contrast.  Sagittal and coronal reconstructions were performed.  Automated exposure control and iterative reconstruction   methods were used.           Findings:  Visualized Chest:  The visualized lung bases and lower mediastinal structures are unremarkable.     Liver: Liver is normal in size and CT density. No focal lesions.     Gallbladder: Significantly distended gallbladder. Possible small layering stones noted within the gallbladder lumen. Trace pericholecystic fluid.     Bile Ducts: Severe distention of the common bile duct measuring up to 2.1 cm. There is also extensive intrahepatic biliary ductal dilation. There is evidence of a possible obstructing lesion noted within the mid to distal common bile duct measuring up to   1.7 x 1.6 cm (image 48 of series 4). More distally the common bile duct appears to be patent (for example image 57 of series 4)     Spleen: Spleen is normal in size and CT density.     Pancreas: No pancreatic ductal dilation. No evidence of acute inflammatory changes.     Adrenals: Adrenal glands are unremarkable.     Kidneys: Kidneys are normal in size. There are no stones or hydronephrosis.     Gastrointestinal: Mild wall thickening of the sigmoid colon is most likely due to underdistention. Colonic diverticulosis without evidence of acute diverticulitis.  Otherwise unremarkable. Normal appendix.     Bladder: The bladder is normal.     Pelvis:  No suspecious mass.     Peritoneum/Mesentery: No fluid collection, ascities, or free air.      Lymph Nodes: Multiple prominent to mildly enlarged upper para-aortic and hilary hepatis lymph nodes. The largest of these measures 1.3 cm on the short axis.     Vasculature: Unremarkable     Abdominal Wall: Unremarkable     Bony Structures: No acute osseous abnormality        Impression:       Impression:  Significant distention of the intrahepatic and extrahepatic bile ducts secondary to a possible obstructing lesion noted within the mid to distal common bile duct  measuring 1.7 cm. Differentials include choledocholithiasis, primary biliary malignancy   versus pancreatic malignancy. Recommend follow-up with dedicated MRI pancreatic mass protocol with and without contrast with dedicated MRCP images.     Multiple prominent to mildly enlarged upper para-aortic and hilary hepatis lymph nodes, nonspecific.     There is evidence of developing acute cholecystitis secondary to biliary duct obstruction.                 Electronically Signed: Alexsander Antony DO    8/11/2024 4:51 PM EDT    Workstation ID: [id number]                   Assessment & Plan          This is a 58 y.o. male with:     Active and Resolved Problems        Active Hospital Problems     Diagnosis   POA    **Common bile duct obstruction [K83.1]   Yes    Choledocholithiasis [K80.50]   Yes       Resolved Hospital Problems   No resolved problems to display.         CBD obstruction, rule out biliary malignancy vs choledocholithiasis   , , T. bili 12.8, alkaline phosphatase 587.  Patient reports ongoing choluria, acholia, postprandial fullness intentional weight loss over the past 4 weeks which raises concern for primary biliary malignancy.  MRCP will be obtained  GI consultation  Will maintain a  n.p.o. after midnight     History of HTN  Resume home dose norvasc        VTE Prophylaxis:  Pharmacologic VTE prophylaxis orders are present.           The patient desires to be as follows:     CODE STATUS:    Full code        Admission Status:  I believe this patient meets inpatient status.     Expected Length of Stay: 2-3     PDMP and Medication Dispenses via Sidebar reviewed and consistent with patient reported medications.     I discussed the patient's findings and my recommendations with patient and nursing staff.        Signature:

## 2024-08-12 NOTE — CONSULTS
GENERAL SURGERY CONSULT    Referring Provider: Eulogio  Reason for Consultation: obstructive jaundice, mass    Patient Care Team:  Ulisses Agosto MD as PCP - General (Internal Medicine)    Chief complaint discolored urine, jaundice    Subjective .     History of present illness:  57 yo man who was transferred from [facility] where he presented for persistent and worsening jaundice.  He states that a week or so ago he noticed discoloration of his urine.  He was started on an antibiotic for presumed UTI by his PCP.  This did not resolve the darkened urine.  He also noted that he was having gray-colored stools at that time.  He was at a  over the weekend and a friend or family member pointed out that he appeared to be jaundiced.  Given these findings he presented to [facility] for further evaluation.  He was found to have an elevated bilirubin.  Imaging was performed showing a suspected mass at the distal common bile duct or pancreatic head.  The patient was transferred here for further workup and care.  Otherwise, he actually feels well.  He denies any fevers or chills, any nausea or vomiting.  An MRI was performed redemonstrating what appears to be a nearly 2 cm mass at the distal common bile duct with concomitant dilation of the remainder of the biliary tree.  I was asked to see the patient for further evaluation.    Review of Systems    Review of Systems   Constitutional:  Negative for chills and fever.   Gastrointestinal:  Negative for nausea and vomiting.        Acholic stools   Genitourinary:         Dark urine   Skin:  Positive for color change.         History  Past Medical History:   Diagnosis Date    Allergic When I was 1    Only seasonal allergies and allergic to some foods    Gout 2005    Only rarely    Hammer toe 2018    Hyperlipidemia     Hypertension    ,   Past Surgical History:   Procedure Laterality Date    COLONOSCOPY     ,   Family History   Problem Relation Age of Onset     Macular degeneration Mother     Heart attack Father 83    Heart disease Father     Skin cancer Sister     No Known Problems Maternal Grandmother     No Known Problems Maternal Grandfather     No Known Problems Paternal Grandmother     Cancer Paternal Grandfather    ,   Social History     Tobacco Use    Smoking status: Never     Passive exposure: Never    Smokeless tobacco: Never   Vaping Use    Vaping status: Never Used   Substance Use Topics    Alcohol use: Yes     Alcohol/week: 1.0 standard drink of alcohol     Types: 1 Cans of beer per week     Comment: occ    Drug use: Never   ,   Medications Prior to Admission   Medication Sig Dispense Refill Last Dose    amLODIPine (NORVASC) 10 MG tablet Take 1 tablet by mouth every night at bedtime. 90 tablet 1 8/11/2024    atorvastatin (LIPITOR) 20 MG tablet Take 1 tablet by mouth every night at bedtime. 90 tablet 1 8/11/2024    multivitamin with minerals tablet tablet Take 1 tablet by mouth Daily.   8/11/2024   , Scheduled Meds:  amLODIPine, 5 mg, Oral, Q24H  enoxaparin, 40 mg, Subcutaneous, Daily  multivitamin with minerals, 1 tablet, Oral, Daily  sodium chloride, 10 mL, Intravenous, Q12H    , Continuous Infusions:  sodium chloride, 75 mL/hr, Last Rate: 75 mL/hr (08/12/24 0025)    , PRN Meds:    senna-docusate sodium **AND** polyethylene glycol **AND** bisacodyl **AND** bisacodyl    Calcium Replacement - Follow Nurse / BPA Driven Protocol    Magnesium Standard Dose Replacement - Follow Nurse / BPA Driven Protocol    Phosphorus Replacement - Follow Nurse / BPA Driven Protocol    Potassium Replacement - Follow Nurse / BPA Driven Protocol    sodium chloride    sodium chloride and Allergies:  Nuts, Peanut-containing drug products, and Peanut oil    Objective     Vital Signs   Temp:  [97.1 °F (36.2 °C)-98.5 °F (36.9 °C)] 98 °F (36.7 °C)  Heart Rate:  [54-82] 55  Resp:  [13-18] 13  BP: (126-144)/(55-96) 128/88    Physical Exam:       General Appearance:    Alert, cooperative, in  no acute distress   Head:    Normocephalic, without obvious abnormality, atraumatic   Eyes:            Lids and lashes normal, scleral icterus, no pallor   Lungs:     Breathing unlabored   Chest Wall:    No abnormalities observed   Abdomen:     Soft, nontender, nondistended   Extremities:   Moves all extremities well   Pulses:   Pulses palpable and equal bilaterally   Skin:   jaundiced   Neurologic:   Cranial nerves 2 - 12 grossly intact       Results Review:  Lab Results (last 72 hours)       Procedure Component Value Units Date/Time    Comprehensive Metabolic Panel [[id number]]  (Abnormal) Collected: 08/12/24 0427    Specimen: Blood Updated: 08/12/24 0510     Glucose 79 mg/dL      BUN 19 mg/dL      Creatinine 0.93 mg/dL      Sodium 135 mmol/L      Potassium 4.1 mmol/L      Comment: Slight hemolysis detected by analyzer. Result may be falsely elevated.        Chloride 102 mmol/L      CO2 20.9 mmol/L      Calcium 9.0 mg/dL      Total Protein 6.8 g/dL      Albumin 3.1 g/dL      ALT (SGPT) 201 U/L      AST (SGOT) 218 U/L      Comment: Slight hemolysis detected by analyzer. Result may be falsely elevated.        Alkaline Phosphatase 531 U/L      Total Bilirubin 11.0 mg/dL      Globulin 3.7 gm/dL      A/G Ratio 0.8 g/dL      BUN/Creatinine Ratio 20.4     Anion Gap 12.1 mmol/L      eGFR 94.6 mL/min/1.73     Narrative:      GFR Normal >60  Chronic Kidney Disease <60  Kidney Failure <15      Magnesium [[id number]]  (Normal) Collected: 08/12/24 0427    Specimen: Blood Updated: 08/12/24 0510     Magnesium 1.9 mg/dL     Phosphorus [[id number]]  (Normal) Collected: 08/12/24 0427    Specimen: Blood Updated: 08/12/24 0503     Phosphorus 3.4 mg/dL     CBC Auto Differential [[id number]]  (Abnormal) Collected: 08/12/24 0427    Specimen: Blood Updated: 08/12/24 0441     WBC 10.68 10*3/mm3      RBC 3.83 10*6/mm3      Hemoglobin 11.5 g/dL      Hematocrit 34.7 %      MCV 90.6 fL      MCH 30.0 pg      MCHC 33.1 g/dL      RDW 18.8 %       RDW-SD 62.2 fl      MPV 10.6 fL      Platelets 378 10*3/mm3      Neutrophil % 55.9 %      Lymphocyte % 31.4 %      Monocyte % 8.8 %      Eosinophil % 2.8 %      Basophil % 0.6 %      Immature Grans % 0.5 %      Neutrophils, Absolute 5.98 10*3/mm3      Lymphocytes, Absolute 3.35 10*3/mm3      Monocytes, Absolute 0.94 10*3/mm3      Eosinophils, Absolute 0.30 10*3/mm3      Basophils, Absolute 0.06 10*3/mm3      Immature Grans, Absolute 0.05 10*3/mm3      nRBC 0.0 /100 WBC     Hepatitis Panel, Acute [[id number]]  (Normal) Collected: 08/11/24 1620    Specimen: Blood Updated: 08/11/24 2027     Hepatitis B Surface Ag Non-Reactive     Hep A IgM Non-Reactive     Hep B C IgM Non-Reactive     Hepatitis C Ab Non-Reactive    Narrative:      Results may be falsely decreased if patient taking Biotin.     Urinalysis, Microscopic Only - Urine, Clean Catch [[id number]]  (Abnormal) Collected: 08/11/24 1510    Specimen: Urine, Clean Catch Updated: 08/11/24 1952     RBC, UA 6-10 /HPF      WBC, UA 0-2 /HPF      Comment: Urine culture not indicated.        Bacteria, UA None Seen /HPF      Squamous Epithelial Cells, UA 0-2 /HPF      Hyaline Casts, UA 0-2 /LPF      Methodology Automated Microscopy    Urinalysis With Culture If Indicated - Urine, Clean Catch [[id number]]  (Abnormal) Collected: 08/11/24 1510    Specimen: Urine, Clean Catch Updated: 08/11/24 1515     Color, UA Brown     Appearance, UA Turbid     pH, UA 5.5     Specific Gravity, UA 1.025     Glucose,  mg/dL (Trace)     Ketones, UA Trace     Bilirubin, UA Large (3+)     Blood, UA Moderate (2+)     Protein, UA 30 mg/dL (1+)     Leuk Esterase, UA Negative     Nitrite, UA Negative     Urobilinogen, UA 1.0 E.U./dL    Narrative:      In absence of clinical symptoms, the presence of pyuria, bacteria, and/or nitrites on the urinalysis result does not correlate with infection.    Comprehensive Metabolic Panel [[id number]]  (Abnormal) Collected: 08/11/24 1449    Specimen: Blood  Updated: 08/11/24 1513     Glucose 105 mg/dL      BUN 19 mg/dL      Creatinine 0.71 mg/dL      Sodium 134 mmol/L      Potassium 3.8 mmol/L      Chloride 103 mmol/L      CO2 24.8 mmol/L      Calcium 9.3 mg/dL      Total Protein 7.1 g/dL      Albumin 3.3 g/dL      ALT (SGPT) 206 U/L      AST (SGOT) 222 U/L      Alkaline Phosphatase 587 U/L      Total Bilirubin 12.8 mg/dL      Globulin 3.8 gm/dL      A/G Ratio 0.9 g/dL      BUN/Creatinine Ratio 26.8     Anion Gap 6.2 mmol/L      eGFR 105.7 mL/min/1.73     Narrative:      GFR Normal >60  Chronic Kidney Disease <60  Kidney Failure <15      Lipase [[id number]]  (Normal) Collected: 08/11/24 1449    Specimen: Blood Updated: 08/11/24 1513     Lipase 32 U/L     CBC & Differential [[id number]]  (Abnormal) Collected: 08/11/24 1449    Specimen: Blood Updated: 08/11/24 1455    Narrative:      The following orders were created for panel order CBC & Differential.  Procedure                               Abnormality         Status                     ---------                               -----------         ------                     CBC Auto Differential[[id number]]        Abnormal            Final result                 Please view results for these tests on the individual orders.    CBC Auto Differential [[id number]]  (Abnormal) Collected: 08/11/24 1449    Specimen: Blood Updated: 08/11/24 1455     WBC 8.90 10*3/mm3      RBC 4.46 10*6/mm3      Hemoglobin 13.0 g/dL      Hematocrit 41.0 %      MCV 91.9 fL      MCH 29.1 pg      MCHC 31.7 g/dL      RDW 18.2 %      RDW-SD 61.6 fl      MPV 10.2 fL      Platelets 433 10*3/mm3      Neutrophil % 57.5 %      Lymphocyte % 31.7 %      Monocyte % 8.4 %      Eosinophil % 1.9 %      Basophil % 0.2 %      Immature Grans % 0.3 %      Neutrophils, Absolute 5.11 10*3/mm3      Lymphocytes, Absolute 2.82 10*3/mm3      Monocytes, Absolute 0.75 10*3/mm3      Eosinophils, Absolute 0.17 10*3/mm3      Basophils, Absolute 0.02 10*3/mm3      Immature Grans,  Absolute 0.03 10*3/mm3           Imaging Results (Last 72 Hours)       Procedure Component Value Units Date/Time    MRI abdomen w wo contrast mrcp [[id number]] Collected: 08/12/24 1033     Updated: 08/12/24 1044    Narrative:      MRI ABDOMEN W WO CONTRAST MRCP    Date of Exam: 8/12/2024 10:00 AM EDT    Indication: R/O pancreatic malignancy.     Comparison: CT abdomen and pelvis 8/9/2024.    Technique:  Routine multiplanar/multisequence images of the abdomen were obtained with MRCP sequences before and after the uneventful administration of 20 cc Prohance.      Findings:  1.5 x 1.9 x 1.9 cm (transverse by AP by craniocaudal) enhancing mass is demonstrated within the lower CBD above the level of the ampulla, at the level of the pancreatic head. There is severe upstream common bile duct dilation up to 2.2 cm. There is   severe diffuse intrahepatic bile duct dilation, with index left hepatic duct measuring 9 mm.    A couple of tiny 2 to 3 mm gallstones are noted within the gallbladder lumen. The gallbladder does not appear inflamed, and is only mildly distended up to 3.5 cm transversely.    The pancreatic parenchyma demonstrates normal homogeneous enhancement. No peripancreatic inflammatory changes are identified, and there is no significant upstream pancreatic parenchymal atrophy.    The spleen, adrenals are normal. Tiny bilateral renal cortical cysts are present, one of the largest in the right anterior upper pole measuring 10 mm.    No pathologically enlarged lymph nodes are identified. A hilary hepatis node measures 8 mm short axis.    The portal vein, SMV, splenic vein, IVC, and hepatic veins are patent.    No gross ascites. Abdominal aortic caliber is normal. The included lung bases are clear, and the heart size appears within normal limits.      Impression:      Impression:    1. 1.5 x 1.9 x 1.9 cm enhancing soft tissue mass within the lower CBD, with upstream biliary obstruction and dilation. The findings are  most worrisome for primary cholangiocarcinoma. Pancreatic malignancy is thought to represent a secondary   consideration. Consider ERCP for diagnostic and therapeutic purposes.  2. Couple of small gallstones are present within the gallbladder lumen.      Electronically Signed: Carole Robledo MD    8/12/2024 10:42 AM EDT    Workstation ID: [id number]    CT Abdomen Pelvis With Contrast [[id number]] Collected: 08/11/24 1644     Updated: 08/11/24 1653    Narrative:      CT ABDOMEN PELVIS W CONTRAST    Date of Exam: 8/11/2024 4:30 PM EDT    Indication: Jaundice and upper abdominal pain..    Comparison: None available.    Technique: Axial CT images were obtained of the abdomen and pelvis following the uneventful intravenous administration of iodinated contrast. Sagittal and coronal reconstructions were performed.  Automated exposure control and iterative reconstruction   methods were used.        Findings:  Visualized Chest:  The visualized lung bases and lower mediastinal structures are unremarkable.    Liver: Liver is normal in size and CT density. No focal lesions.    Gallbladder: Significantly distended gallbladder. Possible small layering stones noted within the gallbladder lumen. Trace pericholecystic fluid.    Bile Ducts: Severe distention of the common bile duct measuring up to 2.1 cm. There is also extensive intrahepatic biliary ductal dilation. There is evidence of a possible obstructing lesion noted within the mid to distal common bile duct measuring up to   1.7 x 1.6 cm (image 48 of series 4). More distally the common bile duct appears to be patent (for example image 57 of series 4)    Spleen: Spleen is normal in size and CT density.    Pancreas: No pancreatic ductal dilation. No evidence of acute inflammatory changes.    Adrenals: Adrenal glands are unremarkable.    Kidneys: Kidneys are normal in size. There are no stones or hydronephrosis.    Gastrointestinal: Mild wall thickening of the sigmoid colon is most  likely due to underdistention. Colonic diverticulosis without evidence of acute diverticulitis.  Otherwise unremarkable. Normal appendix.    Bladder: The bladder is normal.    Pelvis:  No suspecious mass.    Peritoneum/Mesentery: No fluid collection, ascities, or free air.      Lymph Nodes: Multiple prominent to mildly enlarged upper para-aortic and hilary hepatis lymph nodes. The largest of these measures 1.3 cm on the short axis.    Vasculature: Unremarkable    Abdominal Wall: Unremarkable    Bony Structures: No acute osseous abnormality      Impression:      Impression:  Significant distention of the intrahepatic and extrahepatic bile ducts secondary to a possible obstructing lesion noted within the mid to distal common bile duct measuring 1.7 cm. Differentials include choledocholithiasis, primary biliary malignancy   versus pancreatic malignancy. Recommend follow-up with dedicated MRI pancreatic mass protocol with and without contrast with dedicated MRCP images.    Multiple prominent to mildly enlarged upper para-aortic and hilary hepatis lymph nodes, nonspecific.    There is evidence of developing acute cholecystitis secondary to biliary duct obstruction.            Electronically Signed: Alexsander Antony DO    8/11/2024 4:51 PM EDT    Workstation ID: [id number]            I reviewed the patient's new clinical results.  I reviewed the patient's new imaging results and agree with the interpretation.  I reviewed the patient's other test results and agree with the interpretation      Assessment & Plan       Common bile duct obstruction    Choledocholithiasis      58-year-old gentleman with suspected cholangiocarcinoma versus pancreatic head mass causing obstructive jaundice    At the end of my visit today the patient was about to be seen by the gastroenterology service.  I would recommend the patient have biliary decompression via ERCP with biopsies if able.  Otherwise he may need an endoscopic ultrasound for  biopsy.  Once tissue diagnosis is confirmed the patient may need chemotherapy and/or evaluation by surgical oncologist.  Certainly I would be available for port placement otherwise from a surgical standpoint I have little to nothing to offer this gentleman and he will need to be seen by surgical oncology for definitive care.    I discussed the patient's findings and my recommendations with patient and family              This document has been electronically signed by Royce Mohr MD on August 12, 2024 13:21 EDT      Royce Mohr MD  08/12/24  13:21 EDT

## 2024-08-13 ENCOUNTER — ANESTHESIA EVENT (OUTPATIENT)
Dept: GASTROENTEROLOGY | Facility: HOSPITAL | Age: 59
DRG: 445 | End: 2024-08-13
Payer: COMMERCIAL

## 2024-08-13 ENCOUNTER — APPOINTMENT (OUTPATIENT)
Dept: GENERAL RADIOLOGY | Facility: HOSPITAL | Age: 59
DRG: 445 | End: 2024-08-13
Payer: COMMERCIAL

## 2024-08-13 ENCOUNTER — ANESTHESIA (OUTPATIENT)
Dept: GASTROENTEROLOGY | Facility: HOSPITAL | Age: 59
DRG: 445 | End: 2024-08-13
Payer: COMMERCIAL

## 2024-08-13 ENCOUNTER — INPATIENT HOSPITAL (AMBULATORY)
Dept: URBAN - METROPOLITAN AREA HOSPITAL 84 | Facility: HOSPITAL | Age: 59
End: 2024-08-13
Payer: COMMERCIAL

## 2024-08-13 ENCOUNTER — READMISSION MANAGEMENT (OUTPATIENT)
Dept: CALL CENTER | Facility: HOSPITAL | Age: 59
End: 2024-08-13
Payer: COMMERCIAL

## 2024-08-13 VITALS
DIASTOLIC BLOOD PRESSURE: 73 MMHG | SYSTOLIC BLOOD PRESSURE: 123 MMHG | BODY MASS INDEX: 28.51 KG/M2 | TEMPERATURE: 98.8 F | HEIGHT: 75 IN | RESPIRATION RATE: 16 BRPM | WEIGHT: 229.28 LBS | OXYGEN SATURATION: 95 % | HEART RATE: 66 BPM

## 2024-08-13 DIAGNOSIS — K83.9 DISEASE OF BILIARY TRACT, UNSPECIFIED: ICD-10-CM

## 2024-08-13 DIAGNOSIS — R17 UNSPECIFIED JAUNDICE: ICD-10-CM

## 2024-08-13 LAB
ALBUMIN SERPL-MCNC: 3 G/DL (ref 3.5–5.2)
ALBUMIN/GLOB SERPL: 0.9 G/DL
ALP SERPL-CCNC: 512 U/L (ref 39–117)
ALPHA-FETOPROTEIN: <2 NG/ML (ref 0–8.3)
ALT SERPL W P-5'-P-CCNC: 179 U/L (ref 1–41)
ANION GAP SERPL CALCULATED.3IONS-SCNC: 10.1 MMOL/L (ref 5–15)
AST SERPL-CCNC: 190 U/L (ref 1–40)
BASOPHILS # BLD AUTO: 0.03 10*3/MM3 (ref 0–0.2)
BASOPHILS NFR BLD AUTO: 0.3 % (ref 0–1.5)
BILIRUB SERPL-MCNC: 10 MG/DL (ref 0–1.2)
BUN SERPL-MCNC: 15 MG/DL (ref 6–20)
BUN/CREAT SERPL: 16 (ref 7–25)
CALCIUM SPEC-SCNC: 9 MG/DL (ref 8.6–10.5)
CHLORIDE SERPL-SCNC: 104 MMOL/L (ref 98–107)
CO2 SERPL-SCNC: 22.9 MMOL/L (ref 22–29)
CREAT SERPL-MCNC: 0.94 MG/DL (ref 0.76–1.27)
DEPRECATED RDW RBC AUTO: 61.8 FL (ref 37–54)
EGFRCR SERPLBLD CKD-EPI 2021: 93.4 ML/MIN/1.73
EOSINOPHIL # BLD AUTO: 0.23 10*3/MM3 (ref 0–0.4)
EOSINOPHIL NFR BLD AUTO: 2.2 % (ref 0.3–6.2)
ERYTHROCYTE [DISTWIDTH] IN BLOOD BY AUTOMATED COUNT: 18.7 % (ref 12.3–15.4)
GLOBULIN UR ELPH-MCNC: 3.5 GM/DL
GLUCOSE SERPL-MCNC: 90 MG/DL (ref 65–99)
HCT VFR BLD AUTO: 34.5 % (ref 37.5–51)
HGB BLD-MCNC: 11.5 G/DL (ref 13–17.7)
IMM GRANULOCYTES # BLD AUTO: 0.04 10*3/MM3 (ref 0–0.05)
IMM GRANULOCYTES NFR BLD AUTO: 0.4 % (ref 0–0.5)
INR PPP: 1.26 (ref 0.93–1.1)
LYMPHOCYTES # BLD AUTO: 2.34 10*3/MM3 (ref 0.7–3.1)
LYMPHOCYTES NFR BLD AUTO: 22.4 % (ref 19.6–45.3)
MCH RBC QN AUTO: 29.9 PG (ref 26.6–33)
MCHC RBC AUTO-ENTMCNC: 33.3 G/DL (ref 31.5–35.7)
MCV RBC AUTO: 89.8 FL (ref 79–97)
MONOCYTES # BLD AUTO: 0.9 10*3/MM3 (ref 0.1–0.9)
MONOCYTES NFR BLD AUTO: 8.6 % (ref 5–12)
NEUTROPHILS NFR BLD AUTO: 6.9 10*3/MM3 (ref 1.7–7)
NEUTROPHILS NFR BLD AUTO: 66.1 % (ref 42.7–76)
NRBC BLD AUTO-RTO: 0 /100 WBC (ref 0–0.2)
PLATELET # BLD AUTO: 359 10*3/MM3 (ref 140–450)
PMV BLD AUTO: 10.4 FL (ref 6–12)
POTASSIUM SERPL-SCNC: 4 MMOL/L (ref 3.5–5.2)
PROT SERPL-MCNC: 6.5 G/DL (ref 6–8.5)
PROTHROMBIN TIME: 13.5 SECONDS (ref 9.6–11.7)
RBC # BLD AUTO: 3.84 10*6/MM3 (ref 4.14–5.8)
SODIUM SERPL-SCNC: 137 MMOL/L (ref 136–145)
WBC NRBC COR # BLD AUTO: 10.44 10*3/MM3 (ref 3.4–10.8)

## 2024-08-13 PROCEDURE — 25810000003 SODIUM CHLORIDE 0.9 % SOLUTION: Performed by: STUDENT IN AN ORGANIZED HEALTH CARE EDUCATION/TRAINING PROGRAM

## 2024-08-13 PROCEDURE — 25810000003 SODIUM CHLORIDE 0.9 % SOLUTION: Performed by: INTERNAL MEDICINE

## 2024-08-13 PROCEDURE — 85025 COMPLETE CBC W/AUTO DIFF WBC: CPT | Performed by: INTERNAL MEDICINE

## 2024-08-13 PROCEDURE — C2625 STENT, NON-COR, TEM W/DEL SY: HCPCS | Performed by: INTERNAL MEDICINE

## 2024-08-13 PROCEDURE — 25010000002 FENTANYL CITRATE (PF) 100 MCG/2ML SOLUTION: Performed by: NURSE ANESTHETIST, CERTIFIED REGISTERED

## 2024-08-13 PROCEDURE — 80053 COMPREHEN METABOLIC PANEL: CPT | Performed by: INTERNAL MEDICINE

## 2024-08-13 PROCEDURE — 74330 X-RAY BILE/PANC ENDOSCOPY: CPT

## 2024-08-13 PROCEDURE — 25010000002 DEXAMETHASONE PER 1 MG: Performed by: NURSE ANESTHETIST, CERTIFIED REGISTERED

## 2024-08-13 PROCEDURE — 85610 PROTHROMBIN TIME: CPT | Performed by: INTERNAL MEDICINE

## 2024-08-13 PROCEDURE — 25010000002 ONDANSETRON PER 1 MG: Performed by: NURSE ANESTHETIST, CERTIFIED REGISTERED

## 2024-08-13 PROCEDURE — 25010000002 PROPOFOL 200 MG/20ML EMULSION: Performed by: NURSE ANESTHETIST, CERTIFIED REGISTERED

## 2024-08-13 PROCEDURE — 88305 TISSUE EXAM BY PATHOLOGIST: CPT | Performed by: INTERNAL MEDICINE

## 2024-08-13 PROCEDURE — 0F798DZ DILATION OF COMMON BILE DUCT WITH INTRALUMINAL DEVICE, VIA NATURAL OR ARTIFICIAL OPENING ENDOSCOPIC: ICD-10-PCS | Performed by: INTERNAL MEDICINE

## 2024-08-13 PROCEDURE — 43262 ENDO CHOLANGIOPANCREATOGRAPH: CPT | Mod: 59 | Performed by: INTERNAL MEDICINE

## 2024-08-13 PROCEDURE — C1769 GUIDE WIRE: HCPCS | Performed by: INTERNAL MEDICINE

## 2024-08-13 PROCEDURE — 25510000001 IOPAMIDOL 61 % SOLUTION 30 ML VIAL: Performed by: INTERNAL MEDICINE

## 2024-08-13 PROCEDURE — 82105 ALPHA-FETOPROTEIN SERUM: CPT | Performed by: INTERNAL MEDICINE

## 2024-08-13 PROCEDURE — 43274 ERCP DUCT STENT PLACEMENT: CPT | Performed by: INTERNAL MEDICINE

## 2024-08-13 PROCEDURE — 25810000003 SODIUM CHLORIDE 0.9 % SOLUTION: Performed by: NURSE ANESTHETIST, CERTIFIED REGISTERED

## 2024-08-13 DEVICE — BILIARY STENT WITH NAVIFLEXTM RX DELIVERY SYSTEM
Type: IMPLANTABLE DEVICE | Site: BILE DUCT | Status: FUNCTIONAL
Brand: ADVANIX™ BILIARY

## 2024-08-13 RX ORDER — PROPOFOL 10 MG/ML
INJECTION, EMULSION INTRAVENOUS AS NEEDED
Status: DISCONTINUED | OUTPATIENT
Start: 2024-08-13 | End: 2024-08-13 | Stop reason: SURG

## 2024-08-13 RX ORDER — INDOMETHACIN 100 MG
SUPPOSITORY, RECTAL RECTAL AS NEEDED
Status: DISCONTINUED | OUTPATIENT
Start: 2024-08-13 | End: 2024-08-13 | Stop reason: HOSPADM

## 2024-08-13 RX ORDER — ONDANSETRON 2 MG/ML
4 INJECTION INTRAMUSCULAR; INTRAVENOUS EVERY 6 HOURS PRN
Status: DISCONTINUED | OUTPATIENT
Start: 2024-08-13 | End: 2024-08-13 | Stop reason: HOSPADM

## 2024-08-13 RX ORDER — ROCURONIUM BROMIDE 10 MG/ML
INJECTION, SOLUTION INTRAVENOUS AS NEEDED
Status: DISCONTINUED | OUTPATIENT
Start: 2024-08-13 | End: 2024-08-13 | Stop reason: SURG

## 2024-08-13 RX ORDER — ONDANSETRON 2 MG/ML
INJECTION INTRAMUSCULAR; INTRAVENOUS AS NEEDED
Status: DISCONTINUED | OUTPATIENT
Start: 2024-08-13 | End: 2024-08-13 | Stop reason: SURG

## 2024-08-13 RX ORDER — DEXAMETHASONE SODIUM PHOSPHATE 4 MG/ML
INJECTION, SOLUTION INTRA-ARTICULAR; INTRALESIONAL; INTRAMUSCULAR; INTRAVENOUS; SOFT TISSUE AS NEEDED
Status: DISCONTINUED | OUTPATIENT
Start: 2024-08-13 | End: 2024-08-13 | Stop reason: SURG

## 2024-08-13 RX ORDER — LIDOCAINE HYDROCHLORIDE 20 MG/ML
INJECTION, SOLUTION EPIDURAL; INFILTRATION; INTRACAUDAL; PERINEURAL AS NEEDED
Status: DISCONTINUED | OUTPATIENT
Start: 2024-08-13 | End: 2024-08-13 | Stop reason: SURG

## 2024-08-13 RX ORDER — SODIUM CHLORIDE 9 MG/ML
9 INJECTION, SOLUTION INTRAVENOUS ONCE
Status: COMPLETED | OUTPATIENT
Start: 2024-08-13 | End: 2024-08-13

## 2024-08-13 RX ORDER — SODIUM CHLORIDE 9 MG/ML
INJECTION, SOLUTION INTRAVENOUS CONTINUOUS PRN
Status: DISCONTINUED | OUTPATIENT
Start: 2024-08-13 | End: 2024-08-13 | Stop reason: SURG

## 2024-08-13 RX ORDER — ONDANSETRON 4 MG/1
4 TABLET, ORALLY DISINTEGRATING ORAL EVERY 6 HOURS PRN
Status: DISCONTINUED | OUTPATIENT
Start: 2024-08-13 | End: 2024-08-13 | Stop reason: HOSPADM

## 2024-08-13 RX ORDER — FENTANYL CITRATE 50 UG/ML
INJECTION, SOLUTION INTRAMUSCULAR; INTRAVENOUS AS NEEDED
Status: DISCONTINUED | OUTPATIENT
Start: 2024-08-13 | End: 2024-08-13 | Stop reason: SURG

## 2024-08-13 RX ADMIN — PROPOFOL 150 MCG/KG/MIN: 10 INJECTION, EMULSION INTRAVENOUS at 11:24

## 2024-08-13 RX ADMIN — PROPOFOL 200 MG: 10 INJECTION, EMULSION INTRAVENOUS at 11:21

## 2024-08-13 RX ADMIN — DEXAMETHASONE SODIUM PHOSPHATE 4 MG: 4 INJECTION, SOLUTION INTRAMUSCULAR; INTRAVENOUS at 11:46

## 2024-08-13 RX ADMIN — FENTANYL CITRATE 50 MCG: 50 INJECTION, SOLUTION INTRAMUSCULAR; INTRAVENOUS at 11:21

## 2024-08-13 RX ADMIN — ONDANSETRON 4 MG: 2 INJECTION INTRAMUSCULAR; INTRAVENOUS at 11:46

## 2024-08-13 RX ADMIN — SODIUM CHLORIDE 9 ML/HR: 9 INJECTION, SOLUTION INTRAVENOUS at 11:03

## 2024-08-13 RX ADMIN — LIDOCAINE HYDROCHLORIDE 100 MG: 20 INJECTION, SOLUTION EPIDURAL; INFILTRATION; INTRACAUDAL; PERINEURAL at 11:21

## 2024-08-13 RX ADMIN — SODIUM CHLORIDE: 9 INJECTION, SOLUTION INTRAVENOUS at 11:16

## 2024-08-13 RX ADMIN — SODIUM CHLORIDE 75 ML/HR: 9 INJECTION, SOLUTION INTRAVENOUS at 07:42

## 2024-08-13 RX ADMIN — ROCURONIUM BROMIDE 50 MG: 10 INJECTION, SOLUTION INTRAVENOUS at 11:21

## 2024-08-13 NOTE — PAYOR COMM NOTE
"Clinical for case# [id number]     Inpatient order 8/11/24    ER admit -   MD notes and clinical attached.   ----------------  AUTHORIZATION PENDING:   PLEASE FAX OR CALL DETERMINATION TO CONTACT BELOW:       THANK YOU,    NYDIA GonzalezN, RN  Utilization Review  [facility]  Phone: [phone]  Fax: [phone]      NPI: [id number]  Tax ID: [id number]        Rupa Leon \"Adolfo\" (58 y.o. Male)       Date of Birth   1965    Social Security Number       Address   [address] [place] [facility]    Home Phone   [phone]    MRN   [id number]       Mosque   Sikhism    Marital Status   Single                            Admission Date   8/11/24    Admission Type   Emergency    Admitting Provider   Llanes Alvarez, Carlos, MD    Attending Provider   Du Krishnan MD    Department, Room/Bed   [facility], 1108/1       Discharge Date       Discharge Disposition       Discharge Destination                                 Attending Provider: Du Krishnan MD    Allergies: Nuts, Peanut-containing Drug Products, Peanut Oil    Isolation: None   Infection: None   Code Status: CPR    Ht: 190.5 cm (75\")   Wt: 104 kg (229 lb 4.5 oz)    Admission Cmt: None   Principal Problem: Common bile duct obstruction [K83.1]                   Active Insurance as of 8/11/2024       Primary Coverage       Payor Plan Insurance Group Employer/Plan Group    [employer]ERCIAL AETNA [id number]       Payor Plan Address Payor Plan Phone Number Payor Plan Fax Number Effective Dates    [address] [address]   1/1/2023 - None Entered    [place] [id number]         Subscriber Name Subscriber Birth Date Member ID       RUPA LEON 1965 [id number]                     Emergency Contacts        (Rel.) Home Phone Work Phone Mobile Phone    House, Vo (Mother) [phone] -- --                 History & Physical        Llanes Alvarez, Carlos, MD at 08/11/24 2223        " "[facility] Medicine Services  History & Physical    Patient Name: Ulisses Rivera  : 1965  MRN: [id number]  Primary Care Physician:  Ulisses Agosto MD  Date of admission: 2024  Date and Time of Service: 2024 at 10:10 PM    Subjective      Chief Complaint: \"urine discoloration\"    History of Present Illness: Ulisses Rivera is a 58 y.o. male with a PMH of w hypertension who presented to [facility] on 2024 with choluria for the past 4 weeks of gradual onset associated with unintentional weight loss of around 20 pounds, acholia, intermittent epigastric pain and postprandial fullness.  Patient states he was initially treated for a possible urine infection for which she completed 7 days Bactrim.  He was at a  last Friday and was told by some of his friends that his skin looked yellow.  Reports intermittent nausea but denies vomiting, melena, hematemesis, fever, chills.  Denies smoking or prior history of malignancy.  Patient was initially evaluated at [facility] where his chemistry labs were suggestive of extrahepatic cholestasis with , , T. bili 12.8, alkaline phosphatase 587.  CBC labs grossly unremarkable.  CT abdomen shows significant distention of intra and extrahepatic biliary ducts with possible obstructing lesion in the mid to distal CBD measuring 1.7 cm.  He was subsequently sent here for MRCP and GI evaluation.    Review of Systems   Constitutional:  Positive for appetite change, fatigue and unexpected weight change. Negative for fever.   HENT:  Negative for ear pain and nosebleeds.    Eyes:  Negative for pain.   Respiratory:  Negative for chest tightness and shortness of breath.    Cardiovascular:  Negative for chest pain and leg swelling.   Gastrointestinal:  Positive for abdominal pain. Negative for abdominal distention and nausea.   Endocrine: Negative for polydipsia.   Genitourinary:  Negative for dysuria.   Musculoskeletal: "  Negative for back pain.   Skin:  Positive for color change.   Neurological:  Negative for dizziness.   Psychiatric/Behavioral:  Negative for confusion.        Personal History     Past Medical History:   Diagnosis Date    Allergic When I was 1    Only seasonal allergies and allergic to some foods    Gout 2005    Only rarely    Hammer toe 2018    Hyperlipidemia     Hypertension        Past Surgical History:   Procedure Laterality Date    COLONOSCOPY         Family History: family history includes Cancer in his paternal grandfather; Heart attack (age of onset: 83) in his father; Heart disease in his father; Macular degeneration in his mother; No Known Problems in his maternal grandfather, maternal grandmother, and paternal grandmother; Skin cancer in his sister. Otherwise pertinent FHx was reviewed and not pertinent to current issue.    Social History:  reports that he has never smoked. He has never been exposed to tobacco smoke. He has never used smokeless tobacco. He reports current alcohol use of about 1.0 standard drink of alcohol per week. He reports that he does not use drugs.    Home Medications:  Prior to Admission Medications       Prescriptions Last Dose Informant Patient Reported? Taking?    amLODIPine (NORVASC) 10 MG tablet   No No    Take 1 tablet by mouth every night at bedtime.    atorvastatin (LIPITOR) 20 MG tablet   No No    Take 1 tablet by mouth every night at bedtime.    multivitamin with minerals tablet tablet   Yes No    Take 1 tablet by mouth Daily.              Allergies:  Allergies   Allergen Reactions    Nuts Nausea And Vomiting    Peanut-Containing Drug Products Anaphylaxis    Peanut Oil Other (See Comments)     Vomiting and an ill feeling       Objective      Vitals:   Temp:  [97.1 °F (36.2 °C)-98 °F (36.7 °C)] 97.1 °F (36.2 °C)  Heart Rate:  [60-78] 72  Resp:  [16-18] 16  BP: (127-144)/(55-96) 127/55  Body mass index is 29.17 kg/m².  Physical Exam  Constitutional:       General: He is not  in acute distress.     Appearance: Normal appearance. He is not ill-appearing.   HENT:      Head: Normocephalic.      Nose: Nose normal.      Mouth/Throat:      Mouth: Mucous membranes are moist.   Eyes:      Extraocular Movements: Extraocular movements intact.      Pupils: Pupils are equal, round, and reactive to light.   Cardiovascular:      Rate and Rhythm: Normal rate.      Pulses: Normal pulses.      Heart sounds: Normal heart sounds. No murmur heard.  Pulmonary:      Effort: Pulmonary effort is normal. No respiratory distress.      Breath sounds: Normal breath sounds. No wheezing.   Abdominal:      General: Abdomen is flat. There is no distension.      Palpations: Abdomen is soft.      Tenderness: There is no abdominal tenderness. There is no guarding.      Comments: Abdul sign negative   Musculoskeletal:         General: Normal range of motion.      Cervical back: Normal range of motion.   Skin:     General: Skin is warm.      Coloration: Skin is jaundiced.   Neurological:      General: No focal deficit present.      Mental Status: He is alert and oriented to person, place, and time.   Psychiatric:         Mood and Affect: Mood normal.         Behavior: Behavior normal.         Diagnostic Data:  Lab Results (last 24 hours)       Procedure Component Value Units Date/Time    Hepatitis Panel, Acute [[id number]]  (Normal) Collected: 08/11/24 1620    Specimen: Blood Updated: 08/11/24 2027     Hepatitis B Surface Ag Non-Reactive     Hep A IgM Non-Reactive     Hep B C IgM Non-Reactive     Hepatitis C Ab Non-Reactive    Narrative:      Results may be falsely decreased if patient taking Biotin.     Urinalysis, Microscopic Only - Urine, Clean Catch [[id number]]  (Abnormal) Collected: 08/11/24 1510    Specimen: Urine, Clean Catch Updated: 08/11/24 1952     RBC, UA 6-10 /HPF      WBC, UA 0-2 /HPF      Comment: Urine culture not indicated.        Bacteria, UA None Seen /HPF      Squamous Epithelial Cells, UA 0-2 /HPF       Hyaline Casts, UA 0-2 /LPF      Methodology Automated Microscopy    Urinalysis With Culture If Indicated - Urine, Clean Catch [[id number]]  (Abnormal) Collected: 08/11/24 1510    Specimen: Urine, Clean Catch Updated: 08/11/24 1515     Color, UA Brown     Appearance, UA Turbid     pH, UA 5.5     Specific Gravity, UA 1.025     Glucose,  mg/dL (Trace)     Ketones, UA Trace     Bilirubin, UA Large (3+)     Blood, UA Moderate (2+)     Protein, UA 30 mg/dL (1+)     Leuk Esterase, UA Negative     Nitrite, UA Negative     Urobilinogen, UA 1.0 E.U./dL    Narrative:      In absence of clinical symptoms, the presence of pyuria, bacteria, and/or nitrites on the urinalysis result does not correlate with infection.    [facility] Urine Culture Tube - Urine, Clean Catch [[id number]] Collected: 08/11/24 1510    Specimen: Urine, Clean Catch Updated: 08/11/24 1515    Comprehensive Metabolic Panel [[id number]]  (Abnormal) Collected: 08/11/24 1449    Specimen: Blood Updated: 08/11/24 1513     Glucose 105 mg/dL      BUN 19 mg/dL      Creatinine 0.71 mg/dL      Sodium 134 mmol/L      Potassium 3.8 mmol/L      Chloride 103 mmol/L      CO2 24.8 mmol/L      Calcium 9.3 mg/dL      Total Protein 7.1 g/dL      Albumin 3.3 g/dL      ALT (SGPT) 206 U/L      AST (SGOT) 222 U/L      Alkaline Phosphatase 587 U/L      Total Bilirubin 12.8 mg/dL      Globulin 3.8 gm/dL      A/G Ratio 0.9 g/dL      BUN/Creatinine Ratio 26.8     Anion Gap 6.2 mmol/L      eGFR 105.7 mL/min/1.73     Narrative:      GFR Normal >60  Chronic Kidney Disease <60  Kidney Failure <15      Lipase [[id number]]  (Normal) Collected: 08/11/24 1449    Specimen: Blood Updated: 08/11/24 1513     Lipase 32 U/L     CBC & Differential [[id number]]  (Abnormal) Collected: 08/11/24 1449    Specimen: Blood Updated: 08/11/24 1455    Narrative:      The following orders were created for panel order CBC & Differential.  Procedure                               Abnormality         Status                      ---------                               -----------         ------                     CBC Auto Differential[[id number]]        Abnormal            Final result                 Please view results for these tests on the individual orders.    CBC Auto Differential [[id number]]  (Abnormal) Collected: 08/11/24 1449    Specimen: Blood Updated: 08/11/24 1455     WBC 8.90 10*3/mm3      RBC 4.46 10*6/mm3      Hemoglobin 13.0 g/dL      Hematocrit 41.0 %      MCV 91.9 fL      MCH 29.1 pg      MCHC 31.7 g/dL      RDW 18.2 %      RDW-SD 61.6 fl      MPV 10.2 fL      Platelets 433 10*3/mm3      Neutrophil % 57.5 %      Lymphocyte % 31.7 %      Monocyte % 8.4 %      Eosinophil % 1.9 %      Basophil % 0.2 %      Immature Grans % 0.3 %      Neutrophils, Absolute 5.11 10*3/mm3      Lymphocytes, Absolute 2.82 10*3/mm3      Monocytes, Absolute 0.75 10*3/mm3      Eosinophils, Absolute 0.17 10*3/mm3      Basophils, Absolute 0.02 10*3/mm3      Immature Grans, Absolute 0.03 10*3/mm3              Imaging Results (Last 24 Hours)       Procedure Component Value Units Date/Time    CT Abdomen Pelvis With Contrast [[id number]] Collected: 08/11/24 1644     Updated: 08/11/24 1653    Narrative:      CT ABDOMEN PELVIS W CONTRAST    Date of Exam: 8/11/2024 4:30 PM EDT    Indication: Jaundice and upper abdominal pain..    Comparison: None available.    Technique: Axial CT images were obtained of the abdomen and pelvis following the uneventful intravenous administration of iodinated contrast. Sagittal and coronal reconstructions were performed.  Automated exposure control and iterative reconstruction   methods were used.        Findings:  Visualized Chest:  The visualized lung bases and lower mediastinal structures are unremarkable.    Liver: Liver is normal in size and CT density. No focal lesions.    Gallbladder: Significantly distended gallbladder. Possible small layering stones noted within the gallbladder lumen. Trace pericholecystic  fluid.    Bile Ducts: Severe distention of the common bile duct measuring up to 2.1 cm. There is also extensive intrahepatic biliary ductal dilation. There is evidence of a possible obstructing lesion noted within the mid to distal common bile duct measuring up to   1.7 x 1.6 cm (image 48 of series 4). More distally the common bile duct appears to be patent (for example image 57 of series 4)    Spleen: Spleen is normal in size and CT density.    Pancreas: No pancreatic ductal dilation. No evidence of acute inflammatory changes.    Adrenals: Adrenal glands are unremarkable.    Kidneys: Kidneys are normal in size. There are no stones or hydronephrosis.    Gastrointestinal: Mild wall thickening of the sigmoid colon is most likely due to underdistention. Colonic diverticulosis without evidence of acute diverticulitis.  Otherwise unremarkable. Normal appendix.    Bladder: The bladder is normal.    Pelvis:  No suspecious mass.    Peritoneum/Mesentery: No fluid collection, ascities, or free air.      Lymph Nodes: Multiple prominent to mildly enlarged upper para-aortic and hilary hepatis lymph nodes. The largest of these measures 1.3 cm on the short axis.    Vasculature: Unremarkable    Abdominal Wall: Unremarkable    Bony Structures: No acute osseous abnormality      Impression:      Impression:  Significant distention of the intrahepatic and extrahepatic bile ducts secondary to a possible obstructing lesion noted within the mid to distal common bile duct measuring 1.7 cm. Differentials include choledocholithiasis, primary biliary malignancy   versus pancreatic malignancy. Recommend follow-up with dedicated MRI pancreatic mass protocol with and without contrast with dedicated MRCP images.    Multiple prominent to mildly enlarged upper para-aortic and hilary hepatis lymph nodes, nonspecific.    There is evidence of developing acute cholecystitis secondary to biliary duct obstruction.            Electronically Signed: Alexsander  DO Arpan    8/11/2024 4:51 PM EDT    Workstation ID: [id number]              Assessment & Plan        This is a 58 y.o. male with:    Active and Resolved Problems  Active Hospital Problems    Diagnosis  POA    **Common bile duct obstruction [K83.1]  Yes    Choledocholithiasis [K80.50]  Yes      Resolved Hospital Problems   No resolved problems to display.       CBD obstruction, rule out biliary malignancy vs choledocholithiasis   , , T. bili 12.8, alkaline phosphatase 587.  Patient reports ongoing choluria, acholia, postprandial fullness intentional weight loss over the past 4 weeks which raises concern for primary biliary malignancy.  MRCP will be obtained  GI consultation  Will maintain a  n.p.o. after midnight    History of HTN  Resume home dose norvasc      VTE Prophylaxis:  Pharmacologic VTE prophylaxis orders are present.        The patient desires to be as follows:    CODE STATUS:       Full code      Admission Status:  I believe this patient meets inpatient status.    Expected Length of Stay: 2-3    PDMP and Medication Dispenses via Sidebar reviewed and consistent with patient reported medications.    I discussed the patient's findings and my recommendations with patient and nursing staff.      Signature:     This document has been electronically signed by Carlos Llanes Alvarez, MD on August 11, 2024 22:23 EDT   [facility]     Electronically signed by Llanes Alvarez, Carlos, MD at 08/11/24 2256          Emergency Department Notes        Gio Roman III, PA at 08/11/24 1556                  EMERGENCY DEPARTMENT ENCOUNTER    Room Number:  1108/1  Date seen:  8/11/2024  Time seen: 15:56 EDT  PCP: Ulisses Agosto MD  Historian: Patient    Discussed/obtained information from independent historians: N/A    HPI:  Chief complaint: Upper abdominal pain and jaundice  A complete HPI/ROS/PMH/PSH/SH/FH are unobtainable due to: Nothing  Context:Ulisses Rivera is a  58 y.o. male having a past medical history of BPH who presents to the ED with c/o upper abdominal pain and jaundice.  Patient reports jaundice was first noticed 2 days ago.  Patient reports he has recently been on a sulfa antibiotic for UTI.  No statins or any other new medications.  He reports he has had some mild upper abdominal pain has been intermittent for a while.  No fever and chills.  Currently he is in no pain.  He denies EtOH use.  No chest pain, shortness of breath.  Patient does report dark-colored urine over the course of the past week.  There has been no white/light-colored stools or bloody bowel movements.      No past medical history of malignancy.  Patient does have his gallbladder.  He is here for further evaluation.    External (non-ED) record review: Patient has limited records in epic.  I can see where he is followed by Dr. Mcclure.  Last seen 5/9/2024.  Noted he has hypertension and dyslipidemia.  Labs were obtained and medications refilled at this visit.  Patient was on atorvastatin at this time.      Chronic or social conditions impacting care:    ALLERGIES  Nuts, Peanut-containing drug products, and Peanut oil    PAST MEDICAL HISTORY  Active Ambulatory Problems     Diagnosis Date Noted    Essential hypertension     Hyperlipidemia     Skin tag 04/26/2021    Multiple nevi 04/26/2021    Atypical nevus of neck 04/26/2021    Screening PSA (prostate specific antigen) 03/28/2023    Dysuria 03/28/2023     Resolved Ambulatory Problems     Diagnosis Date Noted    No Resolved Ambulatory Problems     Past Medical History:   Diagnosis Date    Allergic When I was 1    Gout 2005    Hammer toe 2018    Hypertension        PAST SURGICAL HISTORY  Past Surgical History:   Procedure Laterality Date    COLONOSCOPY         FAMILY HISTORY  Family History   Problem Relation Age of Onset    Macular degeneration Mother     Heart attack Father 83    Heart disease Father     Skin cancer Sister     No Known Problems  Maternal Grandmother     No Known Problems Maternal Grandfather     No Known Problems Paternal Grandmother     Cancer Paternal Grandfather        SOCIAL HISTORY  Social History     Socioeconomic History    Marital status: Single   Tobacco Use    Smoking status: Never     Passive exposure: Never    Smokeless tobacco: Never   Vaping Use    Vaping status: Never Used   Substance and Sexual Activity    Alcohol use: Yes     Alcohol/week: 1.0 standard drink of alcohol     Types: 1 Cans of beer per week     Comment: occ    Drug use: Never    Sexual activity: Yes     Partners: Female     Birth control/protection: None       REVIEW OF SYSTEMS  Review of Systems    All systems reviewed and negative except for those discussed in HPI.     PHYSICAL EXAM    I have reviewed the triage vital signs and nursing notes.  Vitals:    08/11/24 2020   BP: 127/55   Pulse: 72   Resp: 16   Temp: 97.1 °F (36.2 °C)   SpO2: 100%     Physical Exam    GENERAL: Nontoxic, not distressed  HENT: nares patent  EYES: no scleral icterus  NECK: no ROM limitations  CV: regular rhythm, regular rate  RESPIRATORY: normal effort  ABDOMEN: soft, no mass, no guarding, no rebound noted  : deferred  MUSCULOSKELETAL: no deformity  NEURO: alert, moves all extremities, follows commands  SKIN: warm, dry    LAB RESULTS  Recent Results (from the past 24 hour(s))   Comprehensive Metabolic Panel    Collection Time: 08/11/24  2:49 PM    Specimen: Blood   Result Value Ref Range    Glucose 105 (H) 65 - 99 mg/dL    BUN 19 6 - 20 mg/dL    Creatinine 0.71 (L) 0.76 - 1.27 mg/dL    Sodium 134 (L) 136 - 145 mmol/L    Potassium 3.8 3.5 - 5.2 mmol/L    Chloride 103 98 - 107 mmol/L    CO2 24.8 22.0 - 29.0 mmol/L    Calcium 9.3 8.6 - 10.5 mg/dL    Total Protein 7.1 6.0 - 8.5 g/dL    Albumin 3.3 (L) 3.5 - 5.2 g/dL    ALT (SGPT) 206 (H) 1 - 41 U/L    AST (SGOT) 222 (H) 1 - 40 U/L    Alkaline Phosphatase 587 (H) 39 - 117 U/L    Total Bilirubin 12.8 (H) 0.0 - 1.2 mg/dL    Globulin 3.8  gm/dL    A/G Ratio 0.9 g/dL    BUN/Creatinine Ratio 26.8 (H) 7.0 - 25.0    Anion Gap 6.2 5.0 - 15.0 mmol/L    eGFR 105.7 >60.0 mL/min/1.73   Lipase    Collection Time: 08/11/24  2:49 PM    Specimen: Blood   Result Value Ref Range    Lipase 32 13 - 60 U/L   CBC Auto Differential    Collection Time: 08/11/24  2:49 PM    Specimen: Blood   Result Value Ref Range    WBC 8.90 3.40 - 10.80 10*3/mm3    RBC 4.46 4.14 - 5.80 10*6/mm3    Hemoglobin 13.0 13.0 - 17.7 g/dL    Hematocrit 41.0 37.5 - 51.0 %    MCV 91.9 79.0 - 97.0 fL    MCH 29.1 26.6 - 33.0 pg    MCHC 31.7 31.5 - 35.7 g/dL    RDW 18.2 (H) 12.3 - 15.4 %    RDW-SD 61.6 (H) 37.0 - 54.0 fl    MPV 10.2 6.0 - 12.0 fL    Platelets 433 140 - 450 10*3/mm3    Neutrophil % 57.5 42.7 - 76.0 %    Lymphocyte % 31.7 19.6 - 45.3 %    Monocyte % 8.4 5.0 - 12.0 %    Eosinophil % 1.9 0.3 - 6.2 %    Basophil % 0.2 0.0 - 1.5 %    Immature Grans % 0.3 0.0 - 0.5 %    Neutrophils, Absolute 5.11 1.70 - 7.00 10*3/mm3    Lymphocytes, Absolute 2.82 0.70 - 3.10 10*3/mm3    Monocytes, Absolute 0.75 0.10 - 0.90 10*3/mm3    Eosinophils, Absolute 0.17 0.00 - 0.40 10*3/mm3    Basophils, Absolute 0.02 0.00 - 0.20 10*3/mm3    Immature Grans, Absolute 0.03 0.00 - 0.05 10*3/mm3   Urinalysis With Culture If Indicated - Urine, Clean Catch    Collection Time: 08/11/24  3:10 PM    Specimen: Urine, Clean Catch   Result Value Ref Range    Color, UA Brown (A) Yellow, Straw    Appearance, UA Turbid (A) Clear    pH, UA 5.5 5.0 - 8.0    Specific Gravity, UA 1.025 1.005 - 1.030    Glucose,  mg/dL (Trace) (A) Negative    Ketones, UA Trace (A) Negative    Bilirubin, UA Large (3+) (A) Negative    Blood, UA Moderate (2+) (A) Negative    Protein, UA 30 mg/dL (1+) (A) Negative    Leuk Esterase, UA Negative Negative    Nitrite, UA Negative Negative    Urobilinogen, UA 1.0 E.U./dL 0.2 - 1.0 E.U./dL   Urinalysis, Microscopic Only - Urine, Clean Catch    Collection Time: 08/11/24  3:10 PM    Specimen: Urine, Clean  Catch   Result Value Ref Range    RBC, UA 6-10 (A) None Seen, 0-2 /HPF    WBC, UA 0-2 None Seen, 0-2 /HPF    Bacteria, UA None Seen None Seen /HPF    Squamous Epithelial Cells, UA 0-2 None Seen, 0-2 /HPF    Hyaline Casts, UA 0-2 None Seen /LPF    Methodology Automated Microscopy    Hepatitis Panel, Acute    Collection Time: 08/11/24  4:20 PM    Specimen: Blood   Result Value Ref Range    Hepatitis B Surface Ag Non-Reactive Non-Reactive    Hep A IgM Non-Reactive Non-Reactive    Hep B C IgM Non-Reactive Non-Reactive    Hepatitis C Ab Non-Reactive Non-Reactive       Ordered the above labs and independently interpreted results.  My findings will be discussed in the ED course or medical decision making section below    RADIOLOGY RESULTS  CT Abdomen Pelvis With Contrast    Result Date: 8/11/2024  CT ABDOMEN PELVIS W CONTRAST Date of Exam: 8/11/2024 4:30 PM EDT Indication: Jaundice and upper abdominal pain.. Comparison: None available. Technique: Axial CT images were obtained of the abdomen and pelvis following the uneventful intravenous administration of iodinated contrast. Sagittal and coronal reconstructions were performed.  Automated exposure control and iterative reconstruction methods were used. Findings: Visualized Chest:  The visualized lung bases and lower mediastinal structures are unremarkable. Liver: Liver is normal in size and CT density. No focal lesions. Gallbladder: Significantly distended gallbladder. Possible small layering stones noted within the gallbladder lumen. Trace pericholecystic fluid. Bile Ducts: Severe distention of the common bile duct measuring up to 2.1 cm. There is also extensive intrahepatic biliary ductal dilation. There is evidence of a possible obstructing lesion noted within the mid to distal common bile duct measuring up to  1.7 x 1.6 cm (image 48 of series 4). More distally the common bile duct appears to be patent (for example image 57 of series 4) Spleen: Spleen is normal in size  and CT density. Pancreas: No pancreatic ductal dilation. No evidence of acute inflammatory changes. Adrenals: Adrenal glands are unremarkable. Kidneys: Kidneys are normal in size. There are no stones or hydronephrosis. Gastrointestinal: Mild wall thickening of the sigmoid colon is most likely due to underdistention. Colonic diverticulosis without evidence of acute diverticulitis. Otherwise unremarkable. Normal appendix. Bladder: The bladder is normal. Pelvis:  No suspecious mass. Peritoneum/Mesentery: No fluid collection, ascities, or free air.   Lymph Nodes: Multiple prominent to mildly enlarged upper para-aortic and hilary hepatis lymph nodes. The largest of these measures 1.3 cm on the short axis. Vasculature: Unremarkable Abdominal Wall: Unremarkable Bony Structures: No acute osseous abnormality     Impression: Significant distention of the intrahepatic and extrahepatic bile ducts secondary to a possible obstructing lesion noted within the mid to distal common bile duct measuring 1.7 cm. Differentials include choledocholithiasis, primary biliary malignancy versus pancreatic malignancy. Recommend follow-up with dedicated MRI pancreatic mass protocol with and without contrast with dedicated MRCP images. Multiple prominent to mildly enlarged upper para-aortic and hilary hepatis lymph nodes, nonspecific. There is evidence of developing acute cholecystitis secondary to biliary duct obstruction. Electronically Signed: Alexsander Antony DO  8/11/2024 4:51 PM EDT  Workstation ID: [id number]      Ordered the above noted radiological studies.  Independently interpreted by me.  My findings will be discussed in the medical decision section below.     PROGRESS, DATA ANALYSIS, CONSULTS AND MEDICAL DECISION MAKING    Please note that this section constitutes my independent interpretation of clinical data including lab results, radiology, EKG's.  This constitutes my independent professional opinion regarding differential diagnosis  and management of this patient.  It may include any factors such as history from outside sources, review of external records, social determinants of health, management of medications, response to those treatments, and discussions with other providers.    ED Course as of 08/11/24 2312   Sun Aug 11, 2024   1558 ALT (SGPT)(!): 206 [RC]   1558 AST (SGOT)(!): 222 [RC]   1558 Alkaline Phosphatase(!): 587 [RC]   1558 Total Bilirubin(!): 12.8 [RC]   1732 Discussed patient's case with general surgery/Dr. Coombs.  GEN coronel will see the patient in consult [RC]   1734 Discussed the patient's case with on-call gastroenterology.  They recommend MRCP with and without contrast to further differentiate the stone versus mass.  They to will see in consultation. [RC]   1735 Discussed patient's case with [facility] with [facility].  To admit to Dr. Wall's care in the [facility] bed. [RC]      ED Course User Index  [RC] Gio Roman III, PA     Orders placed during this visit:  Orders Placed This Encounter   Procedures    CT Abdomen Pelvis With Contrast    MRI abdomen w wo contrast mrcp    Urinalysis With Culture If Indicated - Urine, Clean Catch    Comprehensive Metabolic Panel    Lipase    CBC Auto Differential    Urinalysis, Microscopic Only - Urine, Clean Catch    [place] Urine Culture Tube -    Hepatitis Panel, Acute    Comprehensive Metabolic Panel    CBC Auto Differential    Magnesium    Phosphorus    NPO Diet NPO Type: Strict NPO    Vital Signs    Intake & Output    Weigh Patient    Oral Care    Saline Lock & Maintain IV Access    Continuous Pulse Oximetry    Hospitalist (on-call MD unless specified)    Surgery (on-call MD unless specified)    Gastroenterology (on-call MD unless specified)    Insert Peripheral IV    Inpatient Admission    Inpatient Admission    CBC & Differential    ED Acknowledgement Form Needed;            Medical Decision Making  Problems Addressed:  Abnormal CT of the abdomen-mass versus CBD  stone: complicated acute illness or injury  Common bile duct obstruction: complicated acute illness or injury  Jaundice: complicated acute illness or injury    Amount and/or Complexity of Data Reviewed  Labs: ordered. Decision-making details documented in ED Course.  Radiology: ordered.    Risk  Prescription drug management.  Decision regarding hospitalization.    DDx: Choledocholithiasis, pancreatic cancer, liver cancer, biliary tree obstruction hemolytic anemia.        DIAGNOSIS  Final diagnoses:   Common bile duct obstruction   Jaundice   Abnormal CT of the abdomen-mass versus CBD stone          Medication List      No changes were made to your prescriptions during this visit.         FOLLOW-UP  No follow-up provider specified.      Latest Documented Vital Signs:  As of 23:12 EDT  BP- 127/55 HR- 72 Temp- 97.1 °F (36.2 °C) (Oral) O2 sat- 100%    Appropriate PPE utilized throughout this patient encounter to include mask, if indicated, per current protocol. Hand hygiene was performed before donning PPE and after removal when leaving the room.    Please note that portions of this were completed with a voice recognition program.     Note Disclaimer: At [facility], we believe that sharing information builds trust and better relationships. You are receiving this note because you are receiving care at [facility] or recently visited. It is possible you will see health information before a provider has talked with you about it. This kind of information can be easy to misunderstand. To help you fully understand what it means for your health, we urge you to discuss this note with your provider.               Electronically signed by Gio Roman III, PA at 08/11/24 2471       Vital Signs (last day)       Date/Time Temp Temp src Pulse Resp BP Patient Position SpO2    08/13/24 1301 -- -- 66 16 123/73 -- 95    08/13/24 1251 -- -- 74 16 109/76 -- 97    08/13/24 1240 -- -- 73 16 105/72 -- 97    08/13/24 1235  98.8 (37.1) Oral 74 16 108/70 -- 97    08/13/24 1230 -- -- 72 16 99/63 -- 96    08/13/24 1225 -- -- 71 16 103/58 -- 95    08/13/24 1057 98.6 (37) Oral 78 14 135/79 Lying 95    08/13/24 0756 98.9 (37.2) Oral 74 16 145/81 Lying 99    08/13/24 0400 98.3 (36.8) Oral 62 14 130/84 Lying 99    08/13/24 0000 98.6 (37) Oral 63 14 142/85 Lying 99    08/12/24 1621 98 (36.7) Oral 59 15 133/80 Lying 98    08/12/24 1158 98 (36.7) Oral 55 13 128/88 Lying 100    08/12/24 1117 -- -- 56 -- 126/83 -- --    08/12/24 0814 98.5 (36.9) Oral 54 15 137/85 Lying 100    08/12/24 0400 98.2 (36.8) Oral 82 16 126/84 Lying 100    08/12/24 0000 97.8 (36.6) Oral 80 15 137/90 Lying 100          Facility-Administered Medications as of 8/13/2024   Medication Dose Route Frequency Provider Last Rate Last Admin    amLODIPine (NORVASC) tablet 5 mg  5 mg Oral Q24H Royce Tomlinson MD   5 mg at 08/12/24 1117    sennosides-docusate (PERICOLACE) 8.6-50 MG per tablet 2 tablet  2 tablet Oral BID PRN Royce Tomlinson MD        And    polyethylene glycol (MIRALAX) packet 17 g  17 g Oral Daily PRN Royce Tomlinson MD        And    bisacodyl (DULCOLAX) EC tablet 5 mg  5 mg Oral Daily PRN Royce Tomlinson MD        And    bisacodyl (DULCOLAX) suppository 10 mg  10 mg Rectal Daily PRN Royce Tomlinson MD        Calcium Replacement - Follow Nurse / BPA Driven Protocol   Does not apply Royce Riddle MD        Enoxaparin Sodium (LOVENOX) syringe 40 mg  40 mg Subcutaneous Daily Royce Tomlinson MD   40 mg at 08/12/24 1526    [COMPLETED] gadoteridol (PROHANCE) injection 20 mL  20 mL Intravenous Once in imaging Du Krishnan MD   20 mL at 08/12/24 1023    [COMPLETED] iopamidol (ISOVUE-370) 76 % injection 100 mL  100 mL Intravenous Once in imaging Royce Bear MD   100 mL at 08/11/24 1641    Magnesium Standard Dose Replacement - Follow Nurse / BPA Driven Protocol   Does not apply Royce Riddle MD         multivitamin with minerals 1 tablet  1 tablet Oral Daily Royce Tomlinson MD   1 tablet at 08/12/24 1419    ondansetron ODT (ZOFRAN-ODT) disintegrating tablet 4 mg  4 mg Oral Q6H PRN Royce Tomlinson MD        Or    ondansetron (ZOFRAN) injection 4 mg  4 mg Intravenous Q6H PRN Royce Tomlinson MD        Phosphorus Replacement - Follow Nurse / BPA Driven Protocol   Does not apply PRN Royce Tomlinson MD        Potassium Replacement - Follow Nurse / BPA Driven Protocol   Does not apply PRN Royce Tomlinson MD        sodium chloride 0.9 % flush 10 mL  10 mL Intravenous Q12H Royce Tomlinson MD   10 mL at 08/12/24 2039    sodium chloride 0.9 % flush 10 mL  10 mL Intravenous PRN Royce Tomlinson MD        sodium chloride 0.9 % infusion 40 mL  40 mL Intravenous PRN Royce Tomlinson MD        sodium chloride 0.9 % infusion  75 mL/hr Intravenous Continuous Royce Tomlinson MD 75 mL/hr at 08/13/24 0742 75 mL/hr at 08/13/24 0742    [COMPLETED] sodium chloride 0.9 % infusion  9 mL/hr Intravenous Once Royce Tomlinson MD 9 mL/hr at 08/13/24 1103 9 mL/hr at 08/13/24 1103     Lab Results (last 24 hours)       Procedure Component Value Units Date/Time    AFP Tumor Marker [[id number]]  (Normal) Collected: 08/13/24 0415    Specimen: Blood Updated: 08/13/24 1215     ALPHA-FETOPROTEIN <2 ng/mL     Narrative:      Alpha Fetoprotein Tumor Marker Reference Range:    0.0-8.3 ng/mL    Note: Normal values apply only to males and nonpregnant females. These results are not interpretable for pregnant females.    Testing Method: Roche [employer] Electrochemiluminescence Immunoassay(ECLIA)  Values obtained with different assay methods or kits cannot be used interchangeably.    Comprehensive Metabolic Panel [[id number]]  (Abnormal) Collected: 08/13/24 0415    Specimen: Blood Updated: 08/13/24 0452     Glucose 90 mg/dL      BUN 15 mg/dL      Creatinine 0.94 mg/dL      Sodium 137 mmol/L       Potassium 4.0 mmol/L      Chloride 104 mmol/L      CO2 22.9 mmol/L      Calcium 9.0 mg/dL      Total Protein 6.5 g/dL      Albumin 3.0 g/dL      ALT (SGPT) 179 U/L      AST (SGOT) 190 U/L      Alkaline Phosphatase 512 U/L      Total Bilirubin 10.0 mg/dL      Globulin 3.5 gm/dL      A/G Ratio 0.9 g/dL      BUN/Creatinine Ratio 16.0     Anion Gap 10.1 mmol/L      eGFR 93.4 mL/min/1.73     Narrative:      GFR Normal >60  Chronic Kidney Disease <60  Kidney Failure <15      Protime-INR [[id number]]  (Abnormal) Collected: 08/13/24 0415    Specimen: Blood Updated: 08/13/24 0433     Protime 13.5 Seconds      INR 1.26    CBC & Differential [[id number]]  (Abnormal) Collected: 08/13/24 0415    Specimen: Blood Updated: 08/13/24 0423    Narrative:      The following orders were created for panel order CBC & Differential.  Procedure                               Abnormality         Status                     ---------                               -----------         ------                     CBC Auto Differential[[id number]]        Abnormal            Final result                 Please view results for these tests on the individual orders.    CBC Auto Differential [[id number]]  (Abnormal) Collected: 08/13/24 0415    Specimen: Blood Updated: 08/13/24 0423     WBC 10.44 10*3/mm3      RBC 3.84 10*6/mm3      Hemoglobin 11.5 g/dL      Hematocrit 34.5 %      MCV 89.8 fL      MCH 29.9 pg      MCHC 33.3 g/dL      RDW 18.7 %      RDW-SD 61.8 fl      MPV 10.4 fL      Platelets 359 10*3/mm3      Neutrophil % 66.1 %      Lymphocyte % 22.4 %      Monocyte % 8.6 %      Eosinophil % 2.2 %      Basophil % 0.3 %      Immature Grans % 0.4 %      Neutrophils, Absolute 6.90 10*3/mm3      Lymphocytes, Absolute 2.34 10*3/mm3      Monocytes, Absolute 0.90 10*3/mm3      Eosinophils, Absolute 0.23 10*3/mm3      Basophils, Absolute 0.03 10*3/mm3      Immature Grans, Absolute 0.04 10*3/mm3      nRBC 0.0 /100 WBC     Cancer Antigen 19-9 [[id number]]   (Abnormal) Collected: 08/12/24 1427    Specimen: Blood from Arm, Right Updated: 08/12/24 2012     CA 19-9 73.1 U/mL     Narrative:      Results may be falsely decreased if patient taking Biotin.    Testing Method: Roche [employer] Electrochemiluminescence Immunoassay(ECLIA)  Values obtained with different assay methods or kits cannot be used interchangeably.          Imaging Results (All)       Procedure Component Value Units Date/Time    FL ERCP pancreatic and biliary ducts [[id number]] Collected: 08/13/24 1241     Updated: 08/13/24 1244    Narrative:      FL ERCP PANCREATIC AND BILIARY DUCTS    Date of Exam: 8/13/2024 11:25 AM EDT    Indication: ENDOSCOPIC RETROGRADE CHOLANGIOPANCREATOGRAPHY WITH SPHINCTEROTOMY, BALLOON CLEARANCE OF BILE DUCT, AND BILIARY STENT PLACEMENT.    Comparison: None available.    Technique:  A series of radiographic digital spot films were obtained in conjunction with a endoscopic catheterization of the biliary and pancreatic ductal system, performed by the gastroenterologist.    Fluoroscopic Time: 4.9 minutes    Number of Images: 10    Findings:  The patient underwent sphincterotomy with balloon clearance of the common bile duct and stent placement.      Impression:      Impression:  Intraoperative imaging during ERCP. Details of the procedure can be found in the endoscopist notes      Electronically Signed: Willam Victor MD    8/13/2024 12:42 PM EDT    Workstation ID: [id number]    MRI abdomen w wo contrast mrcp [[id number]] Collected: 08/12/24 1033     Updated: 08/12/24 1044    Narrative:      MRI ABDOMEN W WO CONTRAST MRCP    Date of Exam: 8/12/2024 10:00 AM EDT    Indication: R/O pancreatic malignancy.     Comparison: CT abdomen and pelvis 8/9/2024.    Technique:  Routine multiplanar/multisequence images of the abdomen were obtained with MRCP sequences before and after the uneventful administration of 20 cc Prohance.      Findings:  1.5 x 1.9 x 1.9 cm (transverse by AP by craniocaudal)  enhancing mass is demonstrated within the lower CBD above the level of the ampulla, at the level of the pancreatic head. There is severe upstream common bile duct dilation up to 2.2 cm. There is   severe diffuse intrahepatic bile duct dilation, with index left hepatic duct measuring 9 mm.    A couple of tiny 2 to 3 mm gallstones are noted within the gallbladder lumen. The gallbladder does not appear inflamed, and is only mildly distended up to 3.5 cm transversely.    The pancreatic parenchyma demonstrates normal homogeneous enhancement. No peripancreatic inflammatory changes are identified, and there is no significant upstream pancreatic parenchymal atrophy.    The spleen, adrenals are normal. Tiny bilateral renal cortical cysts are present, one of the largest in the right anterior upper pole measuring 10 mm.    No pathologically enlarged lymph nodes are identified. A hilary hepatis node measures 8 mm short axis.    The portal vein, SMV, splenic vein, IVC, and hepatic veins are patent.    No gross ascites. Abdominal aortic caliber is normal. The included lung bases are clear, and the heart size appears within normal limits.      Impression:      Impression:    1. 1.5 x 1.9 x 1.9 cm enhancing soft tissue mass within the lower CBD, with upstream biliary obstruction and dilation. The findings are most worrisome for primary cholangiocarcinoma. Pancreatic malignancy is thought to represent a secondary   consideration. Consider ERCP for diagnostic and therapeutic purposes.  2. Couple of small gallstones are present within the gallbladder lumen.      Electronically Signed: Carole Robledo MD    8/12/2024 10:42 AM EDT    Workstation ID: [id number]    CT Abdomen Pelvis With Contrast [[id number]] Collected: 08/11/24 1644     Updated: 08/11/24 1653    Narrative:      CT ABDOMEN PELVIS W CONTRAST    Date of Exam: 8/11/2024 4:30 PM EDT    Indication: Jaundice and upper abdominal pain..    Comparison: None available.    Technique:  Axial CT images were obtained of the abdomen and pelvis following the uneventful intravenous administration of iodinated contrast. Sagittal and coronal reconstructions were performed.  Automated exposure control and iterative reconstruction   methods were used.        Findings:  Visualized Chest:  The visualized lung bases and lower mediastinal structures are unremarkable.    Liver: Liver is normal in size and CT density. No focal lesions.    Gallbladder: Significantly distended gallbladder. Possible small layering stones noted within the gallbladder lumen. Trace pericholecystic fluid.    Bile Ducts: Severe distention of the common bile duct measuring up to 2.1 cm. There is also extensive intrahepatic biliary ductal dilation. There is evidence of a possible obstructing lesion noted within the mid to distal common bile duct measuring up to   1.7 x 1.6 cm (image 48 of series 4). More distally the common bile duct appears to be patent (for example image 57 of series 4)    Spleen: Spleen is normal in size and CT density.    Pancreas: No pancreatic ductal dilation. No evidence of acute inflammatory changes.    Adrenals: Adrenal glands are unremarkable.    Kidneys: Kidneys are normal in size. There are no stones or hydronephrosis.    Gastrointestinal: Mild wall thickening of the sigmoid colon is most likely due to underdistention. Colonic diverticulosis without evidence of acute diverticulitis.  Otherwise unremarkable. Normal appendix.    Bladder: The bladder is normal.    Pelvis:  No suspecious mass.    Peritoneum/Mesentery: No fluid collection, ascities, or free air.      Lymph Nodes: Multiple prominent to mildly enlarged upper para-aortic and hilary hepatis lymph nodes. The largest of these measures 1.3 cm on the short axis.    Vasculature: Unremarkable    Abdominal Wall: Unremarkable    Bony Structures: No acute osseous abnormality      Impression:      Impression:  Significant distention of the intrahepatic and  extrahepatic bile ducts secondary to a possible obstructing lesion noted within the mid to distal common bile duct measuring 1.7 cm. Differentials include choledocholithiasis, primary biliary malignancy   versus pancreatic malignancy. Recommend follow-up with dedicated MRI pancreatic mass protocol with and without contrast with dedicated MRCP images.    Multiple prominent to mildly enlarged upper para-aortic and hilary hepatis lymph nodes, nonspecific.    There is evidence of developing acute cholecystitis secondary to biliary duct obstruction.            Electronically Signed: Alexsander Antony DO    8/11/2024 4:51 PM EDT    Workstation ID: [id number]             Operative/Procedure Notes (all)        Procedures signed by Royce Tomlinson MD at 08/13/24 1219   Version 1 of 1       Procedure Orders    1. ERCP [[id number]] ordered by Royce Tomlinson MD at 08/13/24 1116               [Media Unavailable] Scan on 8/13/2024 1217 by Royce Tomlinson MD: ERCP          Electronically signed by Royce Tomlinson MD at 08/13/24 1219       Royce Tomlinson MD at 08/13/24 1133  Version 1 of 1         ENDOSCOPIC RETROGRADE CHOLANGIOPANCREATOGRAPHY Procedure Report    Patient Name:  Ulisses Rivera  YOB: 1965    Date of Surgery:  8/13/2024     Pre-Op Diagnosis:  Jaundice [R17]  Abnormal CT of the abdomen [R93.5]    Postop diagnosis:  1.  Intraductal biliary polypoid lesions, status post biliary sphincterotomy and stent placement        Procedure/CPT® Codes:      Procedure(s):  ENDOSCOPIC RETROGRADE CHOLANGIOPANCREATOGRAPHY WITH SPHINCTEROTOMY, BALLOON CLEARANCE OF BILE DUCT, AND BILIARY STENT PLACEMENT    Staff:  Surgeon(s):  Royce Tomlinson MD      Anesthesia: General    Description of Procedure:  A description of the procedure as well as risks, benefits and alternative methods were explained to the patient who voiced understanding and signed the corresponding consent  form.Specifically risks of post-ERCP pancreatitis, bleeding, perforation, failure to canulate and adverse reaction to sedation were discussed. A physical exam was performed and vital signs were monitored throughout the procedure.    A  film was performed which was normal. With the patient in the semi-prone position, an Olympus side viewing endoscope was placed into the mouth and proceeded through the esophagus, stomach and second portion of the duodenum without difficulty. Limited views of the esophagus and stomach were normal. The ampulla was visualized and appeared normal. A Rezee hydrotome was used to cannulate the ampulla using wire guided technique. Bile was aspirated to ensure this was the duct of interest. Contrast was injected into the bile duct.      The scope was then retroflexed and the fundus was visualized. The procedure was not difficult and there were no immediate complications. There was no blood loss.    Findings:   Major papilla was unremarkable in appearance.  The common bile duct was cannulated with mild difficulty using a preloaded 0.035 Jagwire and the common bile duct was deeply cannulated over the wire.  Bile was aspirated and contrast was injected.  A cholangiogram was obtained and revealed a markedly dilated intrahepatic biliary tree and proximal common bile duct up to approximately 16 mm.  In the mid duct there was a lobular irregular filling defect which was almost polypoid in appearance.  There was another filling defect in the distal duct which was not completely obstructing but also somewhat polypoid.  Therefore a moderate-sized biliary sphincterotomy was performed in the 12 o'clock position.  Then the sphincterotome was withdrawn and a 12/15 mm stone extraction balloon was passed over the wire to the bifurcation of the common bile duct.  The balloon was inflated to 15 mm and pulled down through the duct.  There was resistance at the mid duct so the balloon was dropped  to 12 mm and pulled down through the duct.  Interestingly, when this was done there appeared to be soft villous type tissue exiting the duct suggestive of polypoid tissue.  A specimen collection trap was attached and suction was applied and the tissue was collected for histologic exam.  The 12/15 mm balloon was pulled down through the duct on multiple occasions and further polypoid type tissue was extracted but no stone debris was noted.  After balloon pull-through's there did appear to be much improved drainage of bile from the common bile duct.  At this point, I passed a 10 [place], 7 cm stent over the wire into good position.  After placement of the stent there was good drainage of bile and contrast noted.  Therefore the endoscope was withdrawn and the procedure was completed.  The patient tolerated procedure well and there were no immediate complications.  There was no blood loss.    Impression:  1.  Obstructive jaundice secondary to common bile duct polypoid lesions.  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    Recommendations:  1.  Regular diet  2.  Okay for discharge home today  3.  I recommend outpatient endoscopic ultrasound and ERCP with spyglass to further evaluate the polypoid lesions noted by ERCP  4.  CA 19-9 is notably 73  5.  If the ERCP with spyglass confirms premalignant polypoid lesions, he will benefit from referral to a hepatobiliary surgeon with bile duct resection.  6.  My office will make arrangements for follow-up endoscopy.      Royce Tomlinson MD     Date: 2024    Time: 12:19 EDT       Electronically signed by Royce Tomlinson MD at 24 1228          Physician Progress Notes (last 48 hours)        Du Krishnan MD at 24 0952               [facility]       Patient Name: Ulisses Rivera  : 1965  MRN: [id number]  Primary Care Physician:  Ulisses Agosto MD  Date of admission: 2024  Date  "and Time of Service: 2024 at 10:10 PM     Subjective       Chief Complaint: \"urine discoloration\"     History of Present Illness: Ulisses Rivera is a 58 y.o. male with a PMH of w hypertension who [facility] with choluria for the past 4 weeks of gradual onset associated with unintentional weight loss of around 20 pounds, acholia, intermittent epigastric pain and postprandial fullness.  Patient states he was initially treated for a possible urine infection for which she completed 7 days Bactrim.  He was at a  last Friday and was told by some of his friends that his skin looked yellow.  Reports intermittent nausea but denies vomiting, melena, hematemesis, fever, chills.  Denies smoking or prior history of malignancy.  Patient was initially evaluated at [facility] where his chemistry labs were suggestive of extrahepatic cholestasis with , , T. bili 12.8, alkaline phosphatase 587.  CBC labs grossly unremarkable.  CT abdomen shows significant distention of intra and extrahepatic biliary ducts with possible obstructing lesion in the mid to distal CBD measuring 1.7 cm.  He was subsequently sent here for MRCP and GI evaluation.     Review of Systems   Constitutional:  Positive for appetite change, fatigue and unexpected weight change. Negative for fever.   HENT:  Negative for ear pain and nosebleeds.    Eyes:  Negative for pain.   Respiratory:  Negative for chest tightness and shortness of breath.    Cardiovascular:  Negative for chest pain and leg swelling.   Gastrointestinal:  Positive for abdominal pain. Negative for abdominal distention and nausea.   Endocrine: Negative for polydipsia.   Genitourinary:  Negative for dysuria.   Musculoskeletal:  Negative for back pain.   Skin:  Positive for color change.   Neurological:  Negative for dizziness.   Psychiatric/Behavioral:  Negative for confusion.          Pt admitted with obs jaundice'  Awaitng gi " ev  Mrcp pending  Mild abd discomfort  Clinically stable otherwise    8/13  Patient is going for ERCP today  Doing fine no new complaint hemodynamically stable  Patient is full code  His ALT AST are still elevated at 190-179 little down from yesterday with bilirubin still elevated at 10    Personal History      Medical History        Past Medical History:   Diagnosis Date    Allergic When I was 1     Only seasonal allergies and allergic to some foods    Gout 2005     Only rarely    Hammer toe 2018    Hyperlipidemia      Hypertension              Surgical History         Past Surgical History:   Procedure Laterality Date    COLONOSCOPY                Family History: family history includes Cancer in his paternal grandfather; Heart attack (age of onset: 83) in his father; Heart disease in his father; Macular degeneration in his mother; No Known Problems in his maternal grandfather, maternal grandmother, and paternal grandmother; Skin cancer in his sister. Otherwise pertinent FHx was reviewed and not pertinent to current issue.     Social History:  reports that he has never smoked. He has never been exposed to tobacco smoke. He has never used smokeless tobacco. He reports current alcohol use of about 1.0 standard drink of alcohol per week. He reports that he does not use drugs.     Home Medications:  Prior to Admission Medications         Prescriptions Last Dose Informant Patient Reported? Taking?     amLODIPine (NORVASC) 10 MG tablet     No No     Take 1 tablet by mouth every night at bedtime.     atorvastatin (LIPITOR) 20 MG tablet     No No     Take 1 tablet by mouth every night at bedtime.     multivitamin with minerals tablet tablet     Yes No     Take 1 tablet by mouth Daily.                   Allergies:  Allergies         Allergies   Allergen Reactions    Nuts Nausea And Vomiting    Peanut-Containing Drug Products Anaphylaxis    Peanut Oil Other (See Comments)       Vomiting and an ill feeling             Objective       Vitals:   Temp:  [97.1 °F (36.2 °C)-98 °F (36.7 °C)] 97.1 °F (36.2 °C)  Heart Rate:  [60-78] 72  Resp:  [16-18] 16  BP: (127-144)/(55-96) 127/55  Body mass index is 29.17 kg/m².  Physical Exam  Constitutional:       General: He is not in acute distress.     Appearance: Normal appearance. He is not ill-appearing.   HENT:      Head: Normocephalic.      Nose: Nose normal.      Mouth/Throat:      Mouth: Mucous membranes are moist.   Eyes:      Extraocular Movements: Extraocular movements intact.      Pupils: Pupils are equal, round, and reactive to light.   Cardiovascular:      Rate and Rhythm: Normal rate.      Pulses: Normal pulses.      Heart sounds: Normal heart sounds. No murmur heard.  Pulmonary:      Effort: Pulmonary effort is normal. No respiratory distress.      Breath sounds: Normal breath sounds. No wheezing.   Abdominal:      General: Abdomen is flat. There is no distension.      Palpations: Abdomen is soft.      Tenderness: There is no abdominal tenderness. There is no guarding.      Comments: Abdul sign negative   Musculoskeletal:         General: Normal range of motion.      Cervical back: Normal range of motion.   Skin:     General: Skin is warm.      Coloration: Skin is jaundiced.   Neurological:      General: No focal deficit present.      Mental Status: He is alert and oriented to person, place, and time.   Psychiatric:         Mood and Affect: Mood normal.         Behavior: Behavior normal.            Diagnostic Data:  Lab Results (last 24 hours)         Procedure Component Value Units Date/Time     Hepatitis Panel, Acute [[id number]]  (Normal) Collected: 08/11/24 1620     Specimen: Blood Updated: 08/11/24 2027       Hepatitis B Surface Ag Non-Reactive       Hep A IgM Non-Reactive       Hep B C IgM Non-Reactive       Hepatitis C Ab Non-Reactive     Narrative:       Results may be falsely decreased if patient taking Biotin.      Urinalysis, Microscopic Only - Urine, Clean Catch  [[id number]]  (Abnormal) Collected: 08/11/24 1510     Specimen: Urine, Clean Catch Updated: 08/11/24 1952       RBC, UA 6-10 /HPF         WBC, UA 0-2 /HPF         Comment: Urine culture not indicated.          Bacteria, UA None Seen /HPF         Squamous Epithelial Cells, UA 0-2 /HPF         Hyaline Casts, UA 0-2 /LPF         Methodology Automated Microscopy     Urinalysis With Culture If Indicated - Urine, Clean Catch [[id number]]  (Abnormal) Collected: 08/11/24 1510     Specimen: Urine, Clean Catch Updated: 08/11/24 1515       Color, UA Brown       Appearance, UA Turbid       pH, UA 5.5       Specific Gravity, UA 1.025       Glucose,  mg/dL (Trace)       Ketones, UA Trace       Bilirubin, UA Large (3+)       Blood, UA Moderate (2+)       Protein, UA 30 mg/dL (1+)       Leuk Esterase, UA Negative       Nitrite, UA Negative       Urobilinogen, UA 1.0 E.U./dL     Narrative:       In absence of clinical symptoms, the presence of pyuria, bacteria, and/or nitrites on the urinalysis result does not correlate with infection.     [place] Urine Culture Tube - Urine, Clean Catch [[id number]] Collected: 08/11/24 1510     Specimen: Urine, Clean Catch Updated: 08/11/24 1515     Comprehensive Metabolic Panel [[id number]]  (Abnormal) Collected: 08/11/24 1449     Specimen: Blood Updated: 08/11/24 1513       Glucose 105 mg/dL         BUN 19 mg/dL         Creatinine 0.71 mg/dL         Sodium 134 mmol/L         Potassium 3.8 mmol/L         Chloride 103 mmol/L         CO2 24.8 mmol/L         Calcium 9.3 mg/dL         Total Protein 7.1 g/dL         Albumin 3.3 g/dL         ALT (SGPT) 206 U/L         AST (SGOT) 222 U/L         Alkaline Phosphatase 587 U/L         Total Bilirubin 12.8 mg/dL         Globulin 3.8 gm/dL         A/G Ratio 0.9 g/dL         BUN/Creatinine Ratio 26.8       Anion Gap 6.2 mmol/L         eGFR 105.7 mL/min/1.73       Narrative:       GFR Normal >60  Chronic Kidney Disease <60  Kidney Failure <15        Lipase  [[id number]]  (Normal) Collected: 08/11/24 1449     Specimen: Blood Updated: 08/11/24 1513       Lipase 32 U/L       CBC & Differential [[id number]]  (Abnormal) Collected: 08/11/24 1449     Specimen: Blood Updated: 08/11/24 1455     Narrative:       The following orders were created for panel order CBC & Differential.  Procedure                               Abnormality         Status                     ---------                               -----------         ------                     CBC Auto Differential[[id number]]        Abnormal            Final result                  Please view results for these tests on the individual orders.     CBC Auto Differential [[id number]]  (Abnormal) Collected: 08/11/24 1449     Specimen: Blood Updated: 08/11/24 1455       WBC 8.90 10*3/mm3         RBC 4.46 10*6/mm3         Hemoglobin 13.0 g/dL         Hematocrit 41.0 %         MCV 91.9 fL         MCH 29.1 pg         MCHC 31.7 g/dL         RDW 18.2 %         RDW-SD 61.6 fl         MPV 10.2 fL         Platelets 433 10*3/mm3         Neutrophil % 57.5 %         Lymphocyte % 31.7 %         Monocyte % 8.4 %         Eosinophil % 1.9 %         Basophil % 0.2 %         Immature Grans % 0.3 %         Neutrophils, Absolute 5.11 10*3/mm3         Lymphocytes, Absolute 2.82 10*3/mm3         Monocytes, Absolute 0.75 10*3/mm3         Eosinophils, Absolute 0.17 10*3/mm3         Basophils, Absolute 0.02 10*3/mm3         Immature Grans, Absolute 0.03 10*3/mm3                             Imaging Results (Last 24 Hours)         Procedure Component Value Units Date/Time     CT Abdomen Pelvis With Contrast [[id number]] Collected: 08/11/24 1644       Updated: 08/11/24 1653     Narrative:       CT ABDOMEN PELVIS W CONTRAST     Date of Exam: 8/11/2024 4:30 PM EDT     Indication: Jaundice and upper abdominal pain..     Comparison: None available.     Technique: Axial CT images were obtained of the abdomen and pelvis following the uneventful intravenous  administration of iodinated contrast. Sagittal and coronal reconstructions were performed.  Automated exposure control and iterative reconstruction   methods were used.           Findings:  Visualized Chest:  The visualized lung bases and lower mediastinal structures are unremarkable.     Liver: Liver is normal in size and CT density. No focal lesions.     Gallbladder: Significantly distended gallbladder. Possible small layering stones noted within the gallbladder lumen. Trace pericholecystic fluid.     Bile Ducts: Severe distention of the common bile duct measuring up to 2.1 cm. There is also extensive intrahepatic biliary ductal dilation. There is evidence of a possible obstructing lesion noted within the mid to distal common bile duct measuring up to   1.7 x 1.6 cm (image 48 of series 4). More distally the common bile duct appears to be patent (for example image 57 of series 4)     Spleen: Spleen is normal in size and CT density.     Pancreas: No pancreatic ductal dilation. No evidence of acute inflammatory changes.     Adrenals: Adrenal glands are unremarkable.     Kidneys: Kidneys are normal in size. There are no stones or hydronephrosis.     Gastrointestinal: Mild wall thickening of the sigmoid colon is most likely due to underdistention. Colonic diverticulosis without evidence of acute diverticulitis.  Otherwise unremarkable. Normal appendix.     Bladder: The bladder is normal.     Pelvis:  No suspecious mass.     Peritoneum/Mesentery: No fluid collection, ascities, or free air.      Lymph Nodes: Multiple prominent to mildly enlarged upper para-aortic and hilary hepatis lymph nodes. The largest of these measures 1.3 cm on the short axis.     Vasculature: Unremarkable     Abdominal Wall: Unremarkable     Bony Structures: No acute osseous abnormality        Impression:       Impression:  Significant distention of the intrahepatic and extrahepatic bile ducts secondary to a possible obstructing lesion noted within  the mid to distal common bile duct measuring 1.7 cm. Differentials include choledocholithiasis, primary biliary malignancy   versus pancreatic malignancy. Recommend follow-up with dedicated MRI pancreatic mass protocol with and without contrast with dedicated MRCP images.     Multiple prominent to mildly enlarged upper para-aortic and hilary hepatis lymph nodes, nonspecific.     There is evidence of developing acute cholecystitis secondary to biliary duct obstruction.                 Electronically Signed: Alexsander Antony DO    8/11/2024 4:51 PM EDT    Workstation ID: [id number]                   Assessment & Plan          This is a 58 y.o. male with:     Active and Resolved Problems        Active Hospital Problems     Diagnosis   POA    **Common bile duct obstruction [K83.1]   Yes    Choledocholithiasis [K80.50]   Yes       Resolved Hospital Problems   No resolved problems to display.         CBD obstruction, rule out biliary malignancy vs choledocholithiasis   , , T. bili 12.8, alkaline phosphatase 587.  Patient reports ongoing choluria, acholia, postprandial fullness intentional weight loss over the past 4 weeks which raises concern for primary biliary malignancy.  MRCP will be obtained  GI consultation  Will maintain a  n.p.o. after midnight     History of HTN  Resume home dose norvasc        VTE Prophylaxis:  Pharmacologic VTE prophylaxis orders are present.           The patient desires to be as follows:     CODE STATUS:    Full code        Admission Status:  I believe this patient meets inpatient status.     Expected Length of Stay: 2-3     PDMP and Medication Dispenses via Sidebar reviewed and consistent with patient reported medications.     I discussed the patient's findings and my recommendations with patient and nursing staff.        Signature:     Electronically signed by Du Krishnan MD at 08/13/24 0953       Du Krishnan MD at 08/12/24 [facility]               [facility]  "Services       Patient Name: Ulisses Rivera  : 1965  MRN: [id number]  Primary Care Physician:  Ulisses Agosto MD  Date of admission: 2024  Date and Time of Service: 2024 at 10:10 PM     Subjective       Chief Complaint: \"urine discoloration\"     History of Present Illness: Ulisses Rivera is a 58 y.o. male with a PMH of w hypertension who presented to [facility] on 2024 with choluria for the past 4 weeks of gradual onset associated with unintentional weight loss of around 20 pounds, acholia, intermittent epigastric pain and postprandial fullness.  Patient states he was initially treated for a possible urine infection for which she completed 7 days Bactrim.  He was at a  last Friday and was told by some of his friends that his skin looked yellow.  Reports intermittent nausea but denies vomiting, melena, hematemesis, fever, chills.  Denies smoking or prior history of malignancy.  Patient was initially evaluated at [facility] where his chemistry labs were suggestive of extrahepatic cholestasis with , , T. bili 12.8, alkaline phosphatase 587.  CBC labs grossly unremarkable.  CT abdomen shows significant distention of intra and extrahepatic biliary ducts with possible obstructing lesion in the mid to distal CBD measuring 1.7 cm.  He was subsequently sent here for MRCP and GI evaluation.     Review of Systems   Constitutional:  Positive for appetite change, fatigue and unexpected weight change. Negative for fever.   HENT:  Negative for ear pain and nosebleeds.    Eyes:  Negative for pain.   Respiratory:  Negative for chest tightness and shortness of breath.    Cardiovascular:  Negative for chest pain and leg swelling.   Gastrointestinal:  Positive for abdominal pain. Negative for abdominal distention and nausea.   Endocrine: Negative for polydipsia.   Genitourinary:  Negative for dysuria.   Musculoskeletal:  Negative for back pain.   Skin:  Positive " for color change.   Neurological:  Negative for dizziness.   Psychiatric/Behavioral:  Negative for confusion.        8/12  Pt admitted with obs jaundice'  Awaitng gi ev  Mrcp pending  Mild abd discomfort  Clinically stable otherwise  Personal History      Medical History        Past Medical History:   Diagnosis Date    Allergic When I was 1     Only seasonal allergies and allergic to some foods    Gout 2005     Only rarely    Hammer toe 2018    Hyperlipidemia      Hypertension              Surgical History         Past Surgical History:   Procedure Laterality Date    COLONOSCOPY                Family History: family history includes Cancer in his paternal grandfather; Heart attack (age of onset: 83) in his father; Heart disease in his father; Macular degeneration in his mother; No Known Problems in his maternal grandfather, maternal grandmother, and paternal grandmother; Skin cancer in his sister. Otherwise pertinent FHx was reviewed and not pertinent to current issue.     Social History:  reports that he has never smoked. He has never been exposed to tobacco smoke. He has never used smokeless tobacco. He reports current alcohol use of about 1.0 standard drink of alcohol per week. He reports that he does not use drugs.     Home Medications:  Prior to Admission Medications         Prescriptions Last Dose Informant Patient Reported? Taking?     amLODIPine (NORVASC) 10 MG tablet     No No     Take 1 tablet by mouth every night at bedtime.     atorvastatin (LIPITOR) 20 MG tablet     No No     Take 1 tablet by mouth every night at bedtime.     multivitamin with minerals tablet tablet     Yes No     Take 1 tablet by mouth Daily.                   Allergies:  Allergies         Allergies   Allergen Reactions    Nuts Nausea And Vomiting    Peanut-Containing Drug Products Anaphylaxis    Peanut Oil Other (See Comments)       Vomiting and an ill feeling            Objective       Vitals:   Temp:  [97.1 °F (36.2 °C)-98 °F (36.7  °C)] 97.1 °F (36.2 °C)  Heart Rate:  [60-78] 72  Resp:  [16-18] 16  BP: (127-144)/(55-96) 127/55  Body mass index is 29.17 kg/m².  Physical Exam  Constitutional:       General: He is not in acute distress.     Appearance: Normal appearance. He is not ill-appearing.   HENT:      Head: Normocephalic.      Nose: Nose normal.      Mouth/Throat:      Mouth: Mucous membranes are moist.   Eyes:      Extraocular Movements: Extraocular movements intact.      Pupils: Pupils are equal, round, and reactive to light.   Cardiovascular:      Rate and Rhythm: Normal rate.      Pulses: Normal pulses.      Heart sounds: Normal heart sounds. No murmur heard.  Pulmonary:      Effort: Pulmonary effort is normal. No respiratory distress.      Breath sounds: Normal breath sounds. No wheezing.   Abdominal:      General: Abdomen is flat. There is no distension.      Palpations: Abdomen is soft.      Tenderness: There is no abdominal tenderness. There is no guarding.      Comments: Abdul sign negative   Musculoskeletal:         General: Normal range of motion.      Cervical back: Normal range of motion.   Skin:     General: Skin is warm.      Coloration: Skin is jaundiced.   Neurological:      General: No focal deficit present.      Mental Status: He is alert and oriented to person, place, and time.   Psychiatric:         Mood and Affect: Mood normal.         Behavior: Behavior normal.            Diagnostic Data:  Lab Results (last 24 hours)         Procedure Component Value Units Date/Time     Hepatitis Panel, Acute [[id number]]  (Normal) Collected: 08/11/24 1620     Specimen: Blood Updated: 08/11/24 2027       Hepatitis B Surface Ag Non-Reactive       Hep A IgM Non-Reactive       Hep B C IgM Non-Reactive       Hepatitis C Ab Non-Reactive     Narrative:       Results may be falsely decreased if patient taking Biotin.      Urinalysis, Microscopic Only - Urine, Clean Catch [[id number]]  (Abnormal) Collected: 08/11/24 1510     Specimen: Urine,  Clean Catch Updated: 08/11/24 1952       RBC, UA 6-10 /HPF         WBC, UA 0-2 /HPF         Comment: Urine culture not indicated.          Bacteria, UA None Seen /HPF         Squamous Epithelial Cells, UA 0-2 /HPF         Hyaline Casts, UA 0-2 /LPF         Methodology Automated Microscopy     Urinalysis With Culture If Indicated - Urine, Clean Catch [[id number]]  (Abnormal) Collected: 08/11/24 1510     Specimen: Urine, Clean Catch Updated: 08/11/24 1515       Color, UA Brown       Appearance, UA Turbid       pH, UA 5.5       Specific Gravity, UA 1.025       Glucose,  mg/dL (Trace)       Ketones, UA Trace       Bilirubin, UA Large (3+)       Blood, UA Moderate (2+)       Protein, UA 30 mg/dL (1+)       Leuk Esterase, UA Negative       Nitrite, UA Negative       Urobilinogen, UA 1.0 E.U./dL     Narrative:       In absence of clinical symptoms, the presence of pyuria, bacteria, and/or nitrites on the urinalysis result does not correlate with infection.     [place] Urine Culture Tube - Urine, Clean Catch [[id number]] Collected: 08/11/24 1510     Specimen: Urine, Clean Catch Updated: 08/11/24 1515     Comprehensive Metabolic Panel [[id number]]  (Abnormal) Collected: 08/11/24 1449     Specimen: Blood Updated: 08/11/24 1513       Glucose 105 mg/dL         BUN 19 mg/dL         Creatinine 0.71 mg/dL         Sodium 134 mmol/L         Potassium 3.8 mmol/L         Chloride 103 mmol/L         CO2 24.8 mmol/L         Calcium 9.3 mg/dL         Total Protein 7.1 g/dL         Albumin 3.3 g/dL         ALT (SGPT) 206 U/L         AST (SGOT) 222 U/L         Alkaline Phosphatase 587 U/L         Total Bilirubin 12.8 mg/dL         Globulin 3.8 gm/dL         A/G Ratio 0.9 g/dL         BUN/Creatinine Ratio 26.8       Anion Gap 6.2 mmol/L         eGFR 105.7 mL/min/1.73       Narrative:       GFR Normal >60  Chronic Kidney Disease <60  Kidney Failure <15        Lipase [[id number]]  (Normal) Collected: 08/11/24 1449     Specimen: Blood  Updated: 08/11/24 1513       Lipase 32 U/L       CBC & Differential [[id number]]  (Abnormal) Collected: 08/11/24 1449     Specimen: Blood Updated: 08/11/24 1455     Narrative:       The following orders were created for panel order CBC & Differential.  Procedure                               Abnormality         Status                     ---------                               -----------         ------                     CBC Auto Differential[[id number]]        Abnormal            Final result                  Please view results for these tests on the individual orders.     CBC Auto Differential [[id number]]  (Abnormal) Collected: 08/11/24 1449     Specimen: Blood Updated: 08/11/24 1455       WBC 8.90 10*3/mm3         RBC 4.46 10*6/mm3         Hemoglobin 13.0 g/dL         Hematocrit 41.0 %         MCV 91.9 fL         MCH 29.1 pg         MCHC 31.7 g/dL         RDW 18.2 %         RDW-SD 61.6 fl         MPV 10.2 fL         Platelets 433 10*3/mm3         Neutrophil % 57.5 %         Lymphocyte % 31.7 %         Monocyte % 8.4 %         Eosinophil % 1.9 %         Basophil % 0.2 %         Immature Grans % 0.3 %         Neutrophils, Absolute 5.11 10*3/mm3         Lymphocytes, Absolute 2.82 10*3/mm3         Monocytes, Absolute 0.75 10*3/mm3         Eosinophils, Absolute 0.17 10*3/mm3         Basophils, Absolute 0.02 10*3/mm3         Immature Grans, Absolute 0.03 10*3/mm3                             Imaging Results (Last 24 Hours)         Procedure Component Value Units Date/Time     CT Abdomen Pelvis With Contrast [[id number]] Collected: 08/11/24 1644       Updated: 08/11/24 1653     Narrative:       CT ABDOMEN PELVIS W CONTRAST     Date of Exam: 8/11/2024 4:30 PM EDT     Indication: Jaundice and upper abdominal pain..     Comparison: None available.     Technique: Axial CT images were obtained of the abdomen and pelvis following the uneventful intravenous administration of iodinated contrast. Sagittal and coronal  reconstructions were performed.  Automated exposure control and iterative reconstruction   methods were used.           Findings:  Visualized Chest:  The visualized lung bases and lower mediastinal structures are unremarkable.     Liver: Liver is normal in size and CT density. No focal lesions.     Gallbladder: Significantly distended gallbladder. Possible small layering stones noted within the gallbladder lumen. Trace pericholecystic fluid.     Bile Ducts: Severe distention of the common bile duct measuring up to 2.1 cm. There is also extensive intrahepatic biliary ductal dilation. There is evidence of a possible obstructing lesion noted within the mid to distal common bile duct measuring up to   1.7 x 1.6 cm (image 48 of series 4). More distally the common bile duct appears to be patent (for example image 57 of series 4)     Spleen: Spleen is normal in size and CT density.     Pancreas: No pancreatic ductal dilation. No evidence of acute inflammatory changes.     Adrenals: Adrenal glands are unremarkable.     Kidneys: Kidneys are normal in size. There are no stones or hydronephrosis.     Gastrointestinal: Mild wall thickening of the sigmoid colon is most likely due to underdistention. Colonic diverticulosis without evidence of acute diverticulitis.  Otherwise unremarkable. Normal appendix.     Bladder: The bladder is normal.     Pelvis:  No suspecious mass.     Peritoneum/Mesentery: No fluid collection, ascities, or free air.      Lymph Nodes: Multiple prominent to mildly enlarged upper para-aortic and hilary hepatis lymph nodes. The largest of these measures 1.3 cm on the short axis.     Vasculature: Unremarkable     Abdominal Wall: Unremarkable     Bony Structures: No acute osseous abnormality        Impression:       Impression:  Significant distention of the intrahepatic and extrahepatic bile ducts secondary to a possible obstructing lesion noted within the mid to distal common bile duct measuring 1.7 cm.  Differentials include choledocholithiasis, primary biliary malignancy   versus pancreatic malignancy. Recommend follow-up with dedicated MRI pancreatic mass protocol with and without contrast with dedicated MRCP images.     Multiple prominent to mildly enlarged upper para-aortic and hilary hepatis lymph nodes, nonspecific.     There is evidence of developing acute cholecystitis secondary to biliary duct obstruction.                 Electronically Signed: Alexsander ChakabillDO naomi    8/11/2024 4:51 PM EDT    Workstation ID: [id number]                   Assessment & Plan          This is a 58 y.o. male with:     Active and Resolved Problems        Active Hospital Problems     Diagnosis   POA    **Common bile duct obstruction [K83.1]   Yes    Choledocholithiasis [K80.50]   Yes       Resolved Hospital Problems   No resolved problems to display.         CBD obstruction, rule out biliary malignancy vs choledocholithiasis   , , T. bili 12.8, alkaline phosphatase 587.  Patient reports ongoing choluria, acholia, postprandial fullness intentional weight loss over the past 4 weeks which raises concern for primary biliary malignancy.  MRCP will be obtained  GI consultation  Will maintain a  n.p.o. after midnight     History of HTN  Resume home dose norvasc        VTE Prophylaxis:  Pharmacologic VTE prophylaxis orders are present.           The patient desires to be as follows:     CODE STATUS:    Full code        Admission Status:  I believe this patient meets inpatient status.     Expected Length of Stay: 2-3     PDMP and Medication Dispenses via Sidebar reviewed and consistent with patient reported medications.     I discussed the patient's findings and my recommendations with patient and nursing staff.        Signature:     Electronically signed by Du Krishnan MD at 08/12/24 1028          Consult Notes (last 48 hours)        Dina Hernández APRN at 08/12/24 1444        Consult Orders    1. Gastroenterology  (on-call MD unless specified) [[id number]] ordered by Gio Roman III, PA at 24 1709              Attestation signed by Royce Tomlinson MD at 24 1815    I have reviewed this documentation and agree.  58-year-old man admitted to the hospital with right upper quadrant pain and jaundice.  On exam he has scleral icterus.  His abdomen is nondistended with good bowel sounds.  He is nontender.  White count 10.68.  Hemoglobin 11.5.  Bilirubin 11.0.  Lipase normal.  MRI shows a 1.5 x 1.9 x 1.9 cm mass in the lower common bile duct near the level of the ampulla suggestive of a malignancy.  We will plan ERCP with stent placement tomorrow.  He will then be referred to [place] to a hepatobiliary surgeon for further evaluation.  I saw the patient today with Dina Hernández NP.  I have performed a complete history and physical examination and reviewed appropriate laboratory studies and radiographic exams.  I have completed the majority and substantive portion of the history and physical examination.  I completed the majority and substantive portion of the medical decision making.    Royce Tomlinson M.D.                    GI CONSULT  NOTE:    Referring Provider:  Dr. Krishnan    Chief complaint: Right upper quadrant pain and jaundice    Subjective .     History of present illness: Ulisses Rivera is a 58 y.o. male with past medical history of BPH, hypertension.  Who presents complaining of right upper quadrant pain, jaundice and urine discoloration.  Patient states that he has had an unintentional weight loss of around 20 pounds, acholic stool, right upper quadrant pain, and that recently at a  of a friend told him that he looked yellow.  He was treated for a UTI recently with trimethoprim/sulfamethoxazole and UTI symptoms are improving.  Patient had already been informed that imaging studies of the liver are suspicious for biliary versus pancreatic malignancy.      Endo History:  2022 EGD  with Dr. Bello- Normal mucosa in the terminal ileum, Internal hemorrhoids, Mild diverticulosis of the descending colon and sigmoid colon, Otherwise normal mucosa in the whole colon.    Past Medical History:  Past Medical History:   Diagnosis Date    Allergic When I was 1    Only seasonal allergies and allergic to some foods    Gout 2005    Only rarely    Hammer toe 2018    Hyperlipidemia     Hypertension        Past Surgical History:  Past Surgical History:   Procedure Laterality Date    COLONOSCOPY         Social History:  Social History     Tobacco Use    Smoking status: Never     Passive exposure: Never    Smokeless tobacco: Never   Vaping Use    Vaping status: Never Used   Substance Use Topics    Alcohol use: Yes     Alcohol/week: 1.0 standard drink of alcohol     Types: 1 Cans of beer per week     Comment: occ    Drug use: Never       Family History:  Family History   Problem Relation Age of Onset    Macular degeneration Mother     Heart attack Father 83    Heart disease Father     Skin cancer Sister     No Known Problems Maternal Grandmother     No Known Problems Maternal Grandfather     No Known Problems Paternal Grandmother     Cancer Paternal Grandfather        Medications:  Medications Prior to Admission   Medication Sig Dispense Refill Last Dose    amLODIPine (NORVASC) 10 MG tablet Take 1 tablet by mouth every night at bedtime. 90 tablet 1 8/11/2024    atorvastatin (LIPITOR) 20 MG tablet Take 1 tablet by mouth every night at bedtime. 90 tablet 1 8/11/2024    multivitamin with minerals tablet tablet Take 1 tablet by mouth Daily.   8/11/2024       Scheduled Meds:amLODIPine, 5 mg, Oral, Q24H  enoxaparin, 40 mg, Subcutaneous, Daily  multivitamin with minerals, 1 tablet, Oral, Daily  sodium chloride, 10 mL, Intravenous, Q12H      Continuous Infusions:sodium chloride, 75 mL/hr, Last Rate: 75 mL/hr (08/12/24 0025)      PRN Meds:.  senna-docusate sodium **AND** polyethylene glycol **AND** bisacodyl **AND**  bisacodyl    Calcium Replacement - Follow Nurse / BPA Driven Protocol    Magnesium Standard Dose Replacement - Follow Nurse / BPA Driven Protocol    Phosphorus Replacement - Follow Nurse / BPA Driven Protocol    Potassium Replacement - Follow Nurse / BPA Driven Protocol    sodium chloride    sodium chloride    ALLERGIES:  Nuts, Peanut-containing drug products, and Peanut oil    ROS:  The following systems were reviewed   Constitution:  No fevers, chills, +20 pound unintentional weight loss  Skin: no rash, + jaundice  Eyes:  No blurry vision, no eye pain  HENT:  No change in hearing or smell  Resp:  No dyspnea or cough  CV:  No chest pain or palpitations  : + dysuria, hematuria  GI: + Right upper quadrant pain  Musculoskeletal:  No leg cramps or arthralgias  Neuro:  No tremor, no numbness  Psych:  No depression or confusion    Objective patient is resting in bed with 2 family members present in the room    Vital Signs:   Vitals:    08/12/24 0400 08/12/24 0814 08/12/24 1117 08/12/24 1158   BP: 126/84 137/85 126/83 128/88   BP Location: Right arm Right arm  Right arm   Patient Position: Lying Lying  Lying   Pulse: 82 54 56 55   Resp: 16 15  13   Temp: 98.2 °F (36.8 °C) 98.5 °F (36.9 °C)  98 °F (36.7 °C)   TempSrc: Oral Oral  Oral   SpO2: 100%      Weight:       Height:           Physical Exam:       General Appearance:    Awake and alert, in no acute distress   Head:    Normocephalic, without obvious abnormality, atraumatic       Lungs:     Respirations regular, even and unlabored   Chest Wall:    No abnormalities observed   Abdomen:     Soft, tender right upper quadrant, no rebound or guarding, non-distended   Rectal:     Deferred   Extremities:   Moves all extremities, no edema, no cyanosis   Pulses:   Pulses palpable and equal bilaterally   Skin:   No rash, + jaundice, normal palpation       Neurologic:   Cranial nerves 2 - 12 grossly intact       Results Review:   I reviewed the patient's labs and imaging.  CBC   "  Results from last 7 days   Lab Units 08/12/24  0427 08/11/24  1449   WBC 10*3/mm3 10.68 8.90   HEMOGLOBIN g/dL 11.5* 13.0   PLATELETS 10*3/mm3 378 433     CMP   Results from last 7 days   Lab Units 08/12/24  0427 08/11/24  1449   SODIUM mmol/L 135* 134*   POTASSIUM mmol/L 4.1 3.8   CHLORIDE mmol/L 102 103   CO2 mmol/L 20.9* 24.8   BUN mg/dL 19 19   CREATININE mg/dL 0.93 0.71*   GLUCOSE mg/dL 79 105*   ALBUMIN g/dL 3.1* 3.3*   BILIRUBIN mg/dL 11.0* 12.8*   ALK PHOS U/L 531* 587*   AST (SGOT) U/L 218* 222*   ALT (SGPT) U/L 201* 206*   MAGNESIUM mg/dL 1.9  --    PHOSPHORUS mg/dL 3.4  --    LIPASE U/L  --  32     Cr Clearance Estimated Creatinine Clearance: 112.6 mL/min (by C-G formula based on SCr of 0.93 mg/dL).  Coag     HbA1C   Lab Results   Component Value Date    HGBA1C 5.50 05/09/2024    HGBA1C 5.80 (H) 03/28/2023    HGBA1C 5.1 03/09/2022     Blood Glucose No results found for: \"POCGLU\"  Infection     UA    Results from last 7 days   Lab Units 08/11/24  1510   NITRITE UA  Negative   WBC UA /HPF 0-2   BACTERIA UA /HPF None Seen   SQUAM EPITHEL UA /HPF 0-2     Imaging Results (Last 72 Hours)       Procedure Component Value Units Date/Time    MRI abdomen w wo contrast mrcp [[id number]] Collected: 08/12/24 1033     Updated: 08/12/24 1044    Narrative:      MRI ABDOMEN W WO CONTRAST MRCP    Date of Exam: 8/12/2024 10:00 AM EDT    Indication: R/O pancreatic malignancy.     Comparison: CT abdomen and pelvis 8/9/2024.    Technique:  Routine multiplanar/multisequence images of the abdomen were obtained with MRCP sequences before and after the uneventful administration of 20 cc Prohance.      Findings:  1.5 x 1.9 x 1.9 cm (transverse by AP by craniocaudal) enhancing mass is demonstrated within the lower CBD above the level of the ampulla, at the level of the pancreatic head. There is severe upstream common bile duct dilation up to 2.2 cm. There is   severe diffuse intrahepatic bile duct dilation, with index left hepatic " duct measuring 9 mm.    A couple of tiny 2 to 3 mm gallstones are noted within the gallbladder lumen. The gallbladder does not appear inflamed, and is only mildly distended up to 3.5 cm transversely.    The pancreatic parenchyma demonstrates normal homogeneous enhancement. No peripancreatic inflammatory changes are identified, and there is no significant upstream pancreatic parenchymal atrophy.    The spleen, adrenals are normal. Tiny bilateral renal cortical cysts are present, one of the largest in the right anterior upper pole measuring 10 mm.    No pathologically enlarged lymph nodes are identified. A hilary hepatis node measures 8 mm short axis.    The portal vein, SMV, splenic vein, IVC, and hepatic veins are patent.    No gross ascites. Abdominal aortic caliber is normal. The included lung bases are clear, and the heart size appears within normal limits.      Impression:      Impression:    1. 1.5 x 1.9 x 1.9 cm enhancing soft tissue mass within the lower CBD, with upstream biliary obstruction and dilation. The findings are most worrisome for primary cholangiocarcinoma. Pancreatic malignancy is thought to represent a secondary   consideration. Consider ERCP for diagnostic and therapeutic purposes.  2. Couple of small gallstones are present within the gallbladder lumen.      Electronically Signed: Carole Robledo MD    8/12/2024 10:42 AM EDT    Workstation ID: [id number]    CT Abdomen Pelvis With Contrast [[id number]] Collected: 08/11/24 1644     Updated: 08/11/24 1653    Narrative:      CT ABDOMEN PELVIS W CONTRAST    Date of Exam: 8/11/2024 4:30 PM EDT    Indication: Jaundice and upper abdominal pain..    Comparison: None available.    Technique: Axial CT images were obtained of the abdomen and pelvis following the uneventful intravenous administration of iodinated contrast. Sagittal and coronal reconstructions were performed.  Automated exposure control and iterative reconstruction   methods were  used.        Findings:  Visualized Chest:  The visualized lung bases and lower mediastinal structures are unremarkable.    Liver: Liver is normal in size and CT density. No focal lesions.    Gallbladder: Significantly distended gallbladder. Possible small layering stones noted within the gallbladder lumen. Trace pericholecystic fluid.    Bile Ducts: Severe distention of the common bile duct measuring up to 2.1 cm. There is also extensive intrahepatic biliary ductal dilation. There is evidence of a possible obstructing lesion noted within the mid to distal common bile duct measuring up to   1.7 x 1.6 cm (image 48 of series 4). More distally the common bile duct appears to be patent (for example image 57 of series 4)    Spleen: Spleen is normal in size and CT density.    Pancreas: No pancreatic ductal dilation. No evidence of acute inflammatory changes.    Adrenals: Adrenal glands are unremarkable.    Kidneys: Kidneys are normal in size. There are no stones or hydronephrosis.    Gastrointestinal: Mild wall thickening of the sigmoid colon is most likely due to underdistention. Colonic diverticulosis without evidence of acute diverticulitis.  Otherwise unremarkable. Normal appendix.    Bladder: The bladder is normal.    Pelvis:  No suspecious mass.    Peritoneum/Mesentery: No fluid collection, ascities, or free air.      Lymph Nodes: Multiple prominent to mildly enlarged upper para-aortic and hilary hepatis lymph nodes. The largest of these measures 1.3 cm on the short axis.    Vasculature: Unremarkable    Abdominal Wall: Unremarkable    Bony Structures: No acute osseous abnormality      Impression:      Impression:  Significant distention of the intrahepatic and extrahepatic bile ducts secondary to a possible obstructing lesion noted within the mid to distal common bile duct measuring 1.7 cm. Differentials include choledocholithiasis, primary biliary malignancy   versus pancreatic malignancy. Recommend follow-up with  dedicated MRI pancreatic mass protocol with and without contrast with dedicated MRCP images.    Multiple prominent to mildly enlarged upper para-aortic and hilary hepatis lymph nodes, nonspecific.    There is evidence of developing acute cholecystitis secondary to biliary duct obstruction.            Electronically Signed: Alexsander DO Arpan    8/11/2024 4:51 PM EDT    Workstation ID: [id number]            ASSESSMENT:  Obstructive jaundice  Abnormal CT of the abdomen  Electrolyte abnormalities  Normocytic anemia       PLAN:  This is a 58-year-old male patient who presented to the ER on 8/11/2024 complaining of right upper quadrant pain, jaundice, urine discoloration.  Patient was treated recently for a UTI, and upon further workup CT abdomen pelvis with contrast showed significant distention of the intrahepatic and extrahepatic bile duct secondary to a possible obstructing lesion noted within the mid to distal common bile duct measuring 1.7 cm.  Differentials include choledocholithiasis, primary biliary malignancy versus pancreatic malignancy.  Multiple prominent to mildly enlarged upper para-aortic and hilary hepatis lymph nodes.  Evidence of developing acute cholecystitis secondary to biliary duct obstruction.  MRCP showed 1.5 x 1.9 x 1 0.9 cm enhancing soft tissue mass within the lower common bile duct, with upstream biliary obstruction and dilation.  The findings are most worrisome for primary cholangiocarcinoma.  Pancreatic malignancy is thought to represent a secondary consideration.  Also showed small gallstones within the gallbladder lumen.    Alk phos 531, total bilirubin 11, , , sodium 135, lipase 32, hepatitis panel nonreactive, negative for HIV screening.    Plan ERCP for diagnostic and therapeutic purposes tomorrow.  Regular diet and n.p.o. after midnight  Supportive care  Check AFP tumor marker  Check cancer antigen 19-9  Continue to monitor LFTs    I discussed the patients findings and my  recommendations with the patient.    We appreciate the referral    Electronically signed by LIDIA Messina, 24, 2:44 PM EDT.      Electronically signed by Royce Tomlinson MD at 24 3056       Royce Mohr MD at 24 1321        Consult Orders    1. Surgery (on-call MD unless specified) [[id number]] ordered by Gio Roman III, PA at 24 1658                 GENERAL SURGERY CONSULT    Referring Provider: Eulogio  Reason for Consultation: obstructive jaundice, mass    Patient Care Team:  Ulisses Agosto MD as PCP - General (Internal Medicine)    Chief complaint discolored urine, jaundice    Subjective .     History of present illness:  57 yo man who was transferred from [facility] where he presented for persistent and worsening jaundice.  He states that a week or so ago he noticed discoloration of his urine.  He was started on an antibiotic for presumed UTI by his PCP.  This did not resolve the darkened urine.  He also noted that he was having gray-colored stools at that time.  He was at a  over the weekend and a friend or family member pointed out that he appeared to be jaundiced.  Given these findings he presented to [facility] for further evaluation.  He was found to have an elevated bilirubin.  Imaging was performed showing a suspected mass at the distal common bile duct or pancreatic head.  The patient was transferred here for further workup and care.  Otherwise, he actually feels well.  He denies any fevers or chills, any nausea or vomiting.  An MRI was performed redemonstrating what appears to be a nearly 2 cm mass at the distal common bile duct with concomitant dilation of the remainder of the biliary tree.  I was asked to see the patient for further evaluation.    Review of Systems    Review of Systems   Constitutional:  Negative for chills and fever.   Gastrointestinal:  Negative for nausea and vomiting.        Acholic stools    Genitourinary:         Dark urine   Skin:  Positive for color change.         History  Past Medical History:   Diagnosis Date    Allergic When I was 1    Only seasonal allergies and allergic to some foods    Gout 2005    Only rarely    Hammer toe 2018    Hyperlipidemia     Hypertension    ,   Past Surgical History:   Procedure Laterality Date    COLONOSCOPY     ,   Family History   Problem Relation Age of Onset    Macular degeneration Mother     Heart attack Father 83    Heart disease Father     Skin cancer Sister     No Known Problems Maternal Grandmother     No Known Problems Maternal Grandfather     No Known Problems Paternal Grandmother     Cancer Paternal Grandfather    ,   Social History     Tobacco Use    Smoking status: Never     Passive exposure: Never    Smokeless tobacco: Never   Vaping Use    Vaping status: Never Used   Substance Use Topics    Alcohol use: Yes     Alcohol/week: 1.0 standard drink of alcohol     Types: 1 Cans of beer per week     Comment: occ    Drug use: Never   ,   Medications Prior to Admission   Medication Sig Dispense Refill Last Dose    amLODIPine (NORVASC) 10 MG tablet Take 1 tablet by mouth every night at bedtime. 90 tablet 1 8/11/2024    atorvastatin (LIPITOR) 20 MG tablet Take 1 tablet by mouth every night at bedtime. 90 tablet 1 8/11/2024    multivitamin with minerals tablet tablet Take 1 tablet by mouth Daily.   8/11/2024   , Scheduled Meds:  amLODIPine, 5 mg, Oral, Q24H  enoxaparin, 40 mg, Subcutaneous, Daily  multivitamin with minerals, 1 tablet, Oral, Daily  sodium chloride, 10 mL, Intravenous, Q12H    , Continuous Infusions:  sodium chloride, 75 mL/hr, Last Rate: 75 mL/hr (08/12/24 0025)    , PRN Meds:    senna-docusate sodium **AND** polyethylene glycol **AND** bisacodyl **AND** bisacodyl    Calcium Replacement - Follow Nurse / BPA Driven Protocol    Magnesium Standard Dose Replacement - Follow Nurse / BPA Driven Protocol    Phosphorus Replacement - Follow Nurse / BPA  Driven Protocol    Potassium Replacement - Follow Nurse / BPA Driven Protocol    sodium chloride    sodium chloride and Allergies:  Nuts, Peanut-containing drug products, and Peanut oil    Objective     Vital Signs   Temp:  [97.1 °F (36.2 °C)-98.5 °F (36.9 °C)] 98 °F (36.7 °C)  Heart Rate:  [54-82] 55  Resp:  [13-18] 13  BP: (126-144)/(55-96) 128/88    Physical Exam:       General Appearance:    Alert, cooperative, in no acute distress   Head:    Normocephalic, without obvious abnormality, atraumatic   Eyes:            Lids and lashes normal, scleral icterus, no pallor   Lungs:     Breathing unlabored   Chest Wall:    No abnormalities observed   Abdomen:     Soft, nontender, nondistended   Extremities:   Moves all extremities well   Pulses:   Pulses palpable and equal bilaterally   Skin:   jaundiced   Neurologic:   Cranial nerves 2 - 12 grossly intact       Results Review:  Lab Results (last 72 hours)       Procedure Component Value Units Date/Time    Comprehensive Metabolic Panel [[id number]]  (Abnormal) Collected: 08/12/24 0427    Specimen: Blood Updated: 08/12/24 0510     Glucose 79 mg/dL      BUN 19 mg/dL      Creatinine 0.93 mg/dL      Sodium 135 mmol/L      Potassium 4.1 mmol/L      Comment: Slight hemolysis detected by analyzer. Result may be falsely elevated.        Chloride 102 mmol/L      CO2 20.9 mmol/L      Calcium 9.0 mg/dL      Total Protein 6.8 g/dL      Albumin 3.1 g/dL      ALT (SGPT) 201 U/L      AST (SGOT) 218 U/L      Comment: Slight hemolysis detected by analyzer. Result may be falsely elevated.        Alkaline Phosphatase 531 U/L      Total Bilirubin 11.0 mg/dL      Globulin 3.7 gm/dL      A/G Ratio 0.8 g/dL      BUN/Creatinine Ratio 20.4     Anion Gap 12.1 mmol/L      eGFR 94.6 mL/min/1.73     Narrative:      GFR Normal >60  Chronic Kidney Disease <60  Kidney Failure <15      Magnesium [[id number]]  (Normal) Collected: 08/12/24 0427    Specimen: Blood Updated: 08/12/24 0510     Magnesium 1.9  mg/dL     Phosphorus [[id number]]  (Normal) Collected: 08/12/24 0427    Specimen: Blood Updated: 08/12/24 0503     Phosphorus 3.4 mg/dL     CBC Auto Differential [[id number]]  (Abnormal) Collected: 08/12/24 0427    Specimen: Blood Updated: 08/12/24 0441     WBC 10.68 10*3/mm3      RBC 3.83 10*6/mm3      Hemoglobin 11.5 g/dL      Hematocrit 34.7 %      MCV 90.6 fL      MCH 30.0 pg      MCHC 33.1 g/dL      RDW 18.8 %      RDW-SD 62.2 fl      MPV 10.6 fL      Platelets 378 10*3/mm3      Neutrophil % 55.9 %      Lymphocyte % 31.4 %      Monocyte % 8.8 %      Eosinophil % 2.8 %      Basophil % 0.6 %      Immature Grans % 0.5 %      Neutrophils, Absolute 5.98 10*3/mm3      Lymphocytes, Absolute 3.35 10*3/mm3      Monocytes, Absolute 0.94 10*3/mm3      Eosinophils, Absolute 0.30 10*3/mm3      Basophils, Absolute 0.06 10*3/mm3      Immature Grans, Absolute 0.05 10*3/mm3      nRBC 0.0 /100 WBC     Hepatitis Panel, Acute [[id number]]  (Normal) Collected: 08/11/24 1620    Specimen: Blood Updated: 08/11/24 2027     Hepatitis B Surface Ag Non-Reactive     Hep A IgM Non-Reactive     Hep B C IgM Non-Reactive     Hepatitis C Ab Non-Reactive    Narrative:      Results may be falsely decreased if patient taking Biotin.     Urinalysis, Microscopic Only - Urine, Clean Catch [[id number]]  (Abnormal) Collected: 08/11/24 1510    Specimen: Urine, Clean Catch Updated: 08/11/24 1952     RBC, UA 6-10 /HPF      WBC, UA 0-2 /HPF      Comment: Urine culture not indicated.        Bacteria, UA None Seen /HPF      Squamous Epithelial Cells, UA 0-2 /HPF      Hyaline Casts, UA 0-2 /LPF      Methodology Automated Microscopy    Urinalysis With Culture If Indicated - Urine, Clean Catch [[id number]]  (Abnormal) Collected: 08/11/24 1510    Specimen: Urine, Clean Catch Updated: 08/11/24 1515     Color, UA Brown     Appearance, UA Turbid     pH, UA 5.5     Specific Gravity, UA 1.025     Glucose,  mg/dL (Trace)     Ketones, UA Trace     Bilirubin, UA Large  (3+)     Blood, UA Moderate (2+)     Protein, UA 30 mg/dL (1+)     Leuk Esterase, UA Negative     Nitrite, UA Negative     Urobilinogen, UA 1.0 E.U./dL    Narrative:      In absence of clinical symptoms, the presence of pyuria, bacteria, and/or nitrites on the urinalysis result does not correlate with infection.    Comprehensive Metabolic Panel [[id number]]  (Abnormal) Collected: 08/11/24 1449    Specimen: Blood Updated: 08/11/24 1513     Glucose 105 mg/dL      BUN 19 mg/dL      Creatinine 0.71 mg/dL      Sodium 134 mmol/L      Potassium 3.8 mmol/L      Chloride 103 mmol/L      CO2 24.8 mmol/L      Calcium 9.3 mg/dL      Total Protein 7.1 g/dL      Albumin 3.3 g/dL      ALT (SGPT) 206 U/L      AST (SGOT) 222 U/L      Alkaline Phosphatase 587 U/L      Total Bilirubin 12.8 mg/dL      Globulin 3.8 gm/dL      A/G Ratio 0.9 g/dL      BUN/Creatinine Ratio 26.8     Anion Gap 6.2 mmol/L      eGFR 105.7 mL/min/1.73     Narrative:      GFR Normal >60  Chronic Kidney Disease <60  Kidney Failure <15      Lipase [[id number]]  (Normal) Collected: 08/11/24 1449    Specimen: Blood Updated: 08/11/24 1513     Lipase 32 U/L     CBC & Differential [[id number]]  (Abnormal) Collected: 08/11/24 1449    Specimen: Blood Updated: 08/11/24 1455    Narrative:      The following orders were created for panel order CBC & Differential.  Procedure                               Abnormality         Status                     ---------                               -----------         ------                     CBC Auto Differential[[id number]]        Abnormal            Final result                 Please view results for these tests on the individual orders.    CBC Auto Differential [[id number]]  (Abnormal) Collected: 08/11/24 1449    Specimen: Blood Updated: 08/11/24 1455     WBC 8.90 10*3/mm3      RBC 4.46 10*6/mm3      Hemoglobin 13.0 g/dL      Hematocrit 41.0 %      MCV 91.9 fL      MCH 29.1 pg      MCHC 31.7 g/dL      RDW 18.2 %      RDW-SD 61.6 fl       MPV 10.2 fL      Platelets 433 10*3/mm3      Neutrophil % 57.5 %      Lymphocyte % 31.7 %      Monocyte % 8.4 %      Eosinophil % 1.9 %      Basophil % 0.2 %      Immature Grans % 0.3 %      Neutrophils, Absolute 5.11 10*3/mm3      Lymphocytes, Absolute 2.82 10*3/mm3      Monocytes, Absolute 0.75 10*3/mm3      Eosinophils, Absolute 0.17 10*3/mm3      Basophils, Absolute 0.02 10*3/mm3      Immature Grans, Absolute 0.03 10*3/mm3           Imaging Results (Last 72 Hours)       Procedure Component Value Units Date/Time    MRI abdomen w wo contrast mrcp [[id number]] Collected: 08/12/24 1033     Updated: 08/12/24 1044    Narrative:      MRI ABDOMEN W WO CONTRAST MRCP    Date of Exam: 8/12/2024 10:00 AM EDT    Indication: R/O pancreatic malignancy.     Comparison: CT abdomen and pelvis 8/9/2024.    Technique:  Routine multiplanar/multisequence images of the abdomen were obtained with MRCP sequences before and after the uneventful administration of 20 cc Prohance.      Findings:  1.5 x 1.9 x 1.9 cm (transverse by AP by craniocaudal) enhancing mass is demonstrated within the lower CBD above the level of the ampulla, at the level of the pancreatic head. There is severe upstream common bile duct dilation up to 2.2 cm. There is   severe diffuse intrahepatic bile duct dilation, with index left hepatic duct measuring 9 mm.    A couple of tiny 2 to 3 mm gallstones are noted within the gallbladder lumen. The gallbladder does not appear inflamed, and is only mildly distended up to 3.5 cm transversely.    The pancreatic parenchyma demonstrates normal homogeneous enhancement. No peripancreatic inflammatory changes are identified, and there is no significant upstream pancreatic parenchymal atrophy.    The spleen, adrenals are normal. Tiny bilateral renal cortical cysts are present, one of the largest in the right anterior upper pole measuring 10 mm.    No pathologically enlarged lymph nodes are identified. A hilary hepatis node  measures 8 mm short axis.    The portal vein, SMV, splenic vein, IVC, and hepatic veins are patent.    No gross ascites. Abdominal aortic caliber is normal. The included lung bases are clear, and the heart size appears within normal limits.      Impression:      Impression:    1. 1.5 x 1.9 x 1.9 cm enhancing soft tissue mass within the lower CBD, with upstream biliary obstruction and dilation. The findings are most worrisome for primary cholangiocarcinoma. Pancreatic malignancy is thought to represent a secondary   consideration. Consider ERCP for diagnostic and therapeutic purposes.  2. Couple of small gallstones are present within the gallbladder lumen.      Electronically Signed: Carole Robledo MD    8/12/2024 10:42 AM EDT    Workstation ID: [id number]    CT Abdomen Pelvis With Contrast [[id number]] Collected: 08/11/24 1644     Updated: 08/11/24 1653    Narrative:      CT ABDOMEN PELVIS W CONTRAST    Date of Exam: 8/11/2024 4:30 PM EDT    Indication: Jaundice and upper abdominal pain..    Comparison: None available.    Technique: Axial CT images were obtained of the abdomen and pelvis following the uneventful intravenous administration of iodinated contrast. Sagittal and coronal reconstructions were performed.  Automated exposure control and iterative reconstruction   methods were used.        Findings:  Visualized Chest:  The visualized lung bases and lower mediastinal structures are unremarkable.    Liver: Liver is normal in size and CT density. No focal lesions.    Gallbladder: Significantly distended gallbladder. Possible small layering stones noted within the gallbladder lumen. Trace pericholecystic fluid.    Bile Ducts: Severe distention of the common bile duct measuring up to 2.1 cm. There is also extensive intrahepatic biliary ductal dilation. There is evidence of a possible obstructing lesion noted within the mid to distal common bile duct measuring up to   1.7 x 1.6 cm (image 48 of series 4). More distally  the common bile duct appears to be patent (for example image 57 of series 4)    Spleen: Spleen is normal in size and CT density.    Pancreas: No pancreatic ductal dilation. No evidence of acute inflammatory changes.    Adrenals: Adrenal glands are unremarkable.    Kidneys: Kidneys are normal in size. There are no stones or hydronephrosis.    Gastrointestinal: Mild wall thickening of the sigmoid colon is most likely due to underdistention. Colonic diverticulosis without evidence of acute diverticulitis.  Otherwise unremarkable. Normal appendix.    Bladder: The bladder is normal.    Pelvis:  No suspecious mass.    Peritoneum/Mesentery: No fluid collection, ascities, or free air.      Lymph Nodes: Multiple prominent to mildly enlarged upper para-aortic and hilary hepatis lymph nodes. The largest of these measures 1.3 cm on the short axis.    Vasculature: Unremarkable    Abdominal Wall: Unremarkable    Bony Structures: No acute osseous abnormality      Impression:      Impression:  Significant distention of the intrahepatic and extrahepatic bile ducts secondary to a possible obstructing lesion noted within the mid to distal common bile duct measuring 1.7 cm. Differentials include choledocholithiasis, primary biliary malignancy   versus pancreatic malignancy. Recommend follow-up with dedicated MRI pancreatic mass protocol with and without contrast with dedicated MRCP images.    Multiple prominent to mildly enlarged upper para-aortic and hilary hepatis lymph nodes, nonspecific.    There is evidence of developing acute cholecystitis secondary to biliary duct obstruction.            Electronically Signed: Alexsander Antony DO    8/11/2024 4:51 PM EDT    Workstation ID: [id number]            I reviewed the patient's new clinical results.  I reviewed the patient's new imaging results and agree with the interpretation.  I reviewed the patient's other test results and agree with the interpretation      Assessment & Plan        Common bile duct obstruction    Choledocholithiasis      58-year-old gentleman with suspected cholangiocarcinoma versus pancreatic head mass causing obstructive jaundice    At the end of my visit today the patient was about to be seen by the gastroenterology service.  I would recommend the patient have biliary decompression via ERCP with biopsies if able.  Otherwise he may need an endoscopic ultrasound for biopsy.  Once tissue diagnosis is confirmed the patient may need chemotherapy and/or evaluation by surgical oncologist.  Certainly I would be available for port placement otherwise from a surgical standpoint I have little to nothing to offer this gentleman and he will need to be seen by surgical oncology for definitive care.    I discussed the patient's findings and my recommendations with patient and family              This document has been electronically signed by Royce Mohr MD on August 12, 2024 13:21 EDT      Royce Mohr MD  08/12/24  13:21 EDT              Electronically signed by Royce Mohr MD at 08/12/24 9387

## 2024-08-13 NOTE — ANESTHESIA PROCEDURE NOTES
Airway  Urgency: elective    Date/Time: 8/13/2024 11:24 AM  Airway not difficult    General Information and Staff    Patient location during procedure: OR  Anesthesiologist: Amos Beavers MD  CRNA/CAA: Mahnaz Suh CRNA    Indications and Patient Condition  Indications for airway management: airway protection    Preoxygenated: yes  MILS maintained throughout  Mask difficulty assessment: 1 - vent by mask    Final Airway Details  Final airway type: endotracheal airway      Successful airway: ETT  Cuffed: yes   Successful intubation technique: video laryngoscopy  Facilitating devices/methods: intubating stylet  Endotracheal tube insertion site: oral  Blade: Ferro  Blade size: 4  ETT size (mm): 7.5  Cormack-Lehane Classification: grade I - full view of glottis  Placement verified by: chest auscultation and capnometry   Measured from: lips  ETT/EBT  to lips (cm): 23  Number of attempts at approach: 1  Assessment: lips, teeth, and gum same as pre-op and atraumatic intubation             no

## 2024-08-13 NOTE — OUTREACH NOTE
Prep Survey      Flowsheet Row Responses   Scientologist [facility] patient discharged from? [place]   Is LACE score < 7 ? Yes   Eligibility [facility]   Date of Admission 08/11/24   Date of Discharge 08/13/24   Discharge Disposition Home or Self Care   Discharge diagnosis Common bile duct obstruction   Does the patient have one of the following disease processes/diagnoses(primary or secondary)? General Surgery   Does the patient have Home health ordered? No   Is there a DME ordered? No   Prep survey completed? Yes            Luann KOWALSKI - Registered Nurse

## 2024-08-13 NOTE — OP NOTE
ENDOSCOPIC RETROGRADE CHOLANGIOPANCREATOGRAPHY Procedure Report    Patient Name:  Ulisses Rivera  YOB: 1965    Date of Surgery:  8/13/2024     Pre-Op Diagnosis:  Jaundice [R17]  Abnormal CT of the abdomen [R93.5]    Postop diagnosis:  1.  Intraductal biliary polypoid lesions, status post biliary sphincterotomy and stent placement        Procedure/CPT® Codes:      Procedure(s):  ENDOSCOPIC RETROGRADE CHOLANGIOPANCREATOGRAPHY WITH SPHINCTEROTOMY, BALLOON CLEARANCE OF BILE DUCT, AND BILIARY STENT PLACEMENT    Staff:  Surgeon(s):  Royce Tomlinson MD      Anesthesia: General    Description of Procedure:  A description of the procedure as well as risks, benefits and alternative methods were explained to the patient who voiced understanding and signed the corresponding consent form.Specifically risks of post-ERCP pancreatitis, bleeding, perforation, failure to canulate and adverse reaction to sedation were discussed. A physical exam was performed and vital signs were monitored throughout the procedure.    A  film was performed which was normal. With the patient in the semi-prone position, an Olympus side viewing endoscope was placed into the mouth and proceeded through the esophagus, stomach and second portion of the duodenum without difficulty. Limited views of the esophagus and stomach were normal. The ampulla was visualized and appeared normal. A EventBoard hydrotome was used to cannulate the ampulla using wire guided technique. Bile was aspirated to ensure this was the duct of interest. Contrast was injected into the bile duct.      The scope was then retroflexed and the fundus was visualized. The procedure was not difficult and there were no immediate complications. There was no blood loss.    Findings:   Major papilla was unremarkable in appearance.  The common bile duct was cannulated with mild difficulty using a preloaded 0.035 Jagwire and the common bile duct was deeply  cannulated over the wire.  Bile was aspirated and contrast was injected.  A cholangiogram was obtained and revealed a markedly dilated intrahepatic biliary tree and proximal common bile duct up to approximately 16 mm.  In the mid duct there was a lobular irregular filling defect which was almost polypoid in appearance.  There was another filling defect in the distal duct which was not completely obstructing but also somewhat polypoid.  Therefore a moderate-sized biliary sphincterotomy was performed in the 12 o'clock position.  Then the sphincterotome was withdrawn and a 12/15 mm stone extraction balloon was passed over the wire to the bifurcation of the common bile duct.  The balloon was inflated to 15 mm and pulled down through the duct.  There was resistance at the mid duct so the balloon was dropped to 12 mm and pulled down through the duct.  Interestingly, when this was done there appeared to be soft villous type tissue exiting the duct suggestive of polypoid tissue.  A specimen collection trap was attached and suction was applied and the tissue was collected for histologic exam.  The 12/15 mm balloon was pulled down through the duct on multiple occasions and further polypoid type tissue was extracted but no stone debris was noted.  After balloon pull-through's there did appear to be much improved drainage of bile from the common bile duct.  At this point, I passed a 10 Lao, 7 cm stent over the wire into good position.  After placement of the stent there was good drainage of bile and contrast noted.  Therefore the endoscope was withdrawn and the procedure was completed.  The patient tolerated procedure well and there were no immediate complications.  There was no blood loss.    Impression:  1.  Obstructive jaundice secondary to common bile duct polypoid lesions.  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    Recommendations:  1.  Regular diet  2.  Okay for discharge home today  3.   I recommend outpatient endoscopic ultrasound and ERCP with spyglass to further evaluate the polypoid lesions noted by ERCP  4.  CA 19-9 is notably 73  5.  If the ERCP with spyglass confirms premalignant polypoid lesions, he will benefit from referral to a hepatobiliary surgeon with bile duct resection.  6.  My office will make arrangements for follow-up endoscopy.      Royce Tomlinson MD     Date: 8/13/2024    Time: 12:19 EDT

## 2024-08-13 NOTE — PLAN OF CARE
Problem: Adult Inpatient Plan of Care  Goal: Plan of Care Review  Outcome: Ongoing, Progressing  Goal: Patient-Specific Goal (Individualized)  Outcome: Ongoing, Progressing  Goal: Absence of Hospital-Acquired Illness or Injury  Outcome: Ongoing, Progressing  Intervention: Identify and Manage Fall Risk  Recent Flowsheet Documentation  Taken 8/13/2024 1400 by Juanita Mims RN  Safety Promotion/Fall Prevention:   assistive device/personal items within reach   clutter free environment maintained   safety round/check completed   room organization consistent  Taken 8/13/2024 1200 by Juanita Mims RN  Safety Promotion/Fall Prevention: patient off unit  Taken 8/13/2024 1000 by Juanita Mims RN  Safety Promotion/Fall Prevention: patient off unit  Taken 8/13/2024 0745 by Juanita Mims RN  Safety Promotion/Fall Prevention:   clutter free environment maintained   assistive device/personal items within reach   room organization consistent   safety round/check completed  Intervention: Prevent Skin Injury  Recent Flowsheet Documentation  Taken 8/13/2024 1400 by Juanita Mims RN  Body Position: position changed independently  Taken 8/13/2024 0745 by Juanita Mims RN  Body Position: position changed independently  Skin Protection:   adhesive use limited   tubing/devices free from skin contact  Intervention: Prevent and Manage VTE (Venous Thromboembolism) Risk  Recent Flowsheet Documentation  Taken 8/13/2024 1400 by Juanita Mims RN  Activity Management: up ad vance  VTE Prevention/Management:   bilateral   sequential compression devices off  Taken 8/13/2024 0745 by Juanita Mims RN  Activity Management: up ad vance  VTE Prevention/Management:   bilateral   sequential compression devices off  Intervention: Prevent Infection  Recent Flowsheet Documentation  Taken 8/13/2024 1400 by Juanita Mims RN  Infection Prevention:   environmental surveillance performed   equipment surfaces disinfected   hand hygiene  promoted   personal protective equipment utilized   single patient room provided  Taken 8/13/2024 0745 by Juanita Mims RN  Infection Prevention:   environmental surveillance performed   equipment surfaces disinfected   hand hygiene promoted   personal protective equipment utilized   single patient room provided  Goal: Optimal Comfort and Wellbeing  Outcome: Ongoing, Progressing  Intervention: Monitor Pain and Promote Comfort  Recent Flowsheet Documentation  Taken 8/13/2024 1400 by Juanita Mims RN  Pain Management Interventions:   care clustered   no interventions per patient request   quiet environment facilitated  Taken 8/13/2024 0745 by Juanita Mims RN  Pain Management Interventions:   care clustered   no interventions per patient request   quiet environment facilitated  Intervention: Provide Person-Centered Care  Recent Flowsheet Documentation  Taken 8/13/2024 1400 by Juanita Mims RN  Trust Relationship/Rapport:   care explained   choices provided   emotional support provided   empathic listening provided   questions answered   thoughts/feelings acknowledged  Taken 8/13/2024 0745 by Juanita Mims RN  Trust Relationship/Rapport:   care explained   choices provided   emotional support provided   empathic listening provided   questions answered   thoughts/feelings acknowledged  Goal: Readiness for Transition of Care  Outcome: Ongoing, Progressing     Problem: COPD (Chronic Obstructive Pulmonary Disease) Comorbidity  Goal: Maintenance of COPD Symptom Control  Outcome: Ongoing, Progressing  Intervention: Maintain COPD-Symptom Control  Recent Flowsheet Documentation  Taken 8/13/2024 0745 by Juanita Mims RN  Medication Review/Management: medications reviewed     Problem: Behavioral Health Comorbidity  Goal: Maintenance of Behavioral Health Symptom Control  Outcome: Ongoing, Progressing  Intervention: Maintain Behavioral Health Symptom Control  Recent Flowsheet Documentation  Taken 8/13/2024 0745  by Juanita Mims RN  Medication Review/Management: medications reviewed     Problem: Asthma Comorbidity  Goal: Maintenance of Asthma Control  Outcome: Ongoing, Progressing  Intervention: Maintain Asthma Symptom Control  Recent Flowsheet Documentation  Taken 8/13/2024 0745 by Juanita Mims RN  Medication Review/Management: medications reviewed     Problem: Diabetes Comorbidity  Goal: Blood Glucose Level Within Targeted Range  Outcome: Ongoing, Progressing  Intervention: Monitor and Manage Glycemia  Recent Flowsheet Documentation  Taken 8/13/2024 1400 by Juanita Mims RN  Glycemic Management: oral hydration promoted     Problem: Hypertension Comorbidity  Goal: Blood Pressure in Desired Range  Outcome: Ongoing, Progressing  Intervention: Maintain Blood Pressure Management  Recent Flowsheet Documentation  Taken 8/13/2024 0745 by Juanita Mims RN  Medication Review/Management: medications reviewed   Goal Outcome Evaluation:

## 2024-08-13 NOTE — DISCHARGE SUMMARY
"Du Krishnan MD  Physician  Hospitalist     Progress Notes     Signed     Date of Service: 24  Creation Time: 24     Signed              [facility]        Patient Name: Ulisses Rivera  : 1965  MRN: [id number]  Primary Care Physician:  Ulisses Agosto MD  Date of admission: 2024  Date and Time of Service: 2024 at 10:10 PM     Subjective       Chief Complaint: \"urine discoloration\"     History of Present Illness: Ulisses Rivera is a 58 y.o. male with a PMH of w hypertension who presented to [facility] on 2024 with choluria for the past 4 weeks of gradual onset associated with unintentional weight loss of around 20 pounds, acholia, intermittent epigastric pain and postprandial fullness.  Patient states he was initially treated for a possible urine infection for which she completed 7 days Bactrim.  He was at a  last Friday and was told by some of his friends that his skin looked yellow.  Reports intermittent nausea but denies vomiting, melena, hematemesis, fever, chills.  Denies smoking or prior history of malignancy.  Patient was initially evaluated at [facility] where his chemistry labs were suggestive of extrahepatic cholestasis with , , T. bili 12.8, alkaline phosphatase 587.  CBC labs grossly unremarkable.  CT abdomen shows significant distention of intra and extrahepatic biliary ducts with possible obstructing lesion in the mid to distal CBD measuring 1.7 cm.  He was subsequently sent here for MRCP and GI evaluation.     Review of Systems   Constitutional:  Positive for appetite change, fatigue and unexpected weight change. Negative for fever.   HENT:  Negative for ear pain and nosebleeds.    Eyes:  Negative for pain.   Respiratory:  Negative for chest tightness and shortness of breath.    Cardiovascular:  Negative for chest pain and leg swelling.   Gastrointestinal:  Positive for abdominal " pain. Negative for abdominal distention and nausea.   Endocrine: Negative for polydipsia.   Genitourinary:  Negative for dysuria.   Musculoskeletal:  Negative for back pain.   Skin:  Positive for color change.   Neurological:  Negative for dizziness.   Psychiatric/Behavioral:  Negative for confusion.        8/12  Pt admitted with obs jaundice'  Awaitng gi ev  Mrcp pending  Mild abd discomfort  Clinically stable otherwise     8/13  Patient is going for ERCP today  Doing fine no new complaint hemodynamically stable  Patient is full code  His ALT AST are still elevated at 190-179 little down from yesterday with bilirubin still elevated at 10   pt had ercp per gi and as per gi note  1.  Obstructive jaundice secondary to common bile duct polypoid lesions.  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     Recommendations:  1.  Regular diet  2.  Okay for discharge home today  3.  I recommend outpatient endoscopic ultrasound and ERCP with spyglass to further evaluate the polypoid lesions noted by ERCP  4.  CA 19-9 is notably 73  5.  If the ERCP with spyglass confirms premalignant polypoid lesions, he will benefit from referral to a hepatobiliary surgeon with bile duct resection.  6.  GI  office will make arrangements for follow-up endoscopy.  Personal History      Medical History           Past Medical History:   Diagnosis Date    Allergic When I was 1     Only seasonal allergies and allergic to some foods    Gout 2005     Only rarely    Hammer toe 2018    Hyperlipidemia      Hypertension              Surgical History             Past Surgical History:   Procedure Laterality Date    COLONOSCOPY                Family History: family history includes Cancer in his paternal grandfather; Heart attack (age of onset: 83) in his father; Heart disease in his father; Macular degeneration in his mother; No Known Problems in his maternal grandfather, maternal grandmother, and paternal grandmother; Skin  cancer in his sister. Otherwise pertinent FHx was reviewed and not pertinent to current issue.     Social History:  reports that he has never smoked. He has never been exposed to tobacco smoke. He has never used smokeless tobacco. He reports current alcohol use of about 1.0 standard drink of alcohol per week. He reports that he does not use drugs.     Home Medications:  Prior to Admission Medications         Prescriptions Last Dose Informant Patient Reported? Taking?     amLODIPine (NORVASC) 10 MG tablet     No No     Take 1 tablet by mouth every night at bedtime.     atorvastatin (LIPITOR) 20 MG tablet     No No     Take 1 tablet by mouth every night at bedtime.     multivitamin with minerals tablet tablet     Yes No     Take 1 tablet by mouth Daily.                   Allergies:  Allergies             Allergies   Allergen Reactions    Nuts Nausea And Vomiting    Peanut-Containing Drug Products Anaphylaxis    Peanut Oil Other (See Comments)       Vomiting and an ill feeling            Objective       Vitals:   Temp:  [97.1 °F (36.2 °C)-98 °F (36.7 °C)] 97.1 °F (36.2 °C)  Heart Rate:  [60-78] 72  Resp:  [16-18] 16  BP: (127-144)/(55-96) 127/55  Body mass index is 29.17 kg/m².  Physical Exam  Constitutional:       General: He is not in acute distress.     Appearance: Normal appearance. He is not ill-appearing.   HENT:      Head: Normocephalic.      Nose: Nose normal.      Mouth/Throat:      Mouth: Mucous membranes are moist.   Eyes:      Extraocular Movements: Extraocular movements intact.      Pupils: Pupils are equal, round, and reactive to light.   Cardiovascular:      Rate and Rhythm: Normal rate.      Pulses: Normal pulses.      Heart sounds: Normal heart sounds. No murmur heard.  Pulmonary:      Effort: Pulmonary effort is normal. No respiratory distress.      Breath sounds: Normal breath sounds. No wheezing.   Abdominal:      General: Abdomen is flat. There is no distension.      Palpations: Abdomen is soft.       Tenderness: There is no abdominal tenderness. There is no guarding.      Comments: Abdul sign negative   Musculoskeletal:         General: Normal range of motion.      Cervical back: Normal range of motion.   Skin:     General: Skin is warm.      Coloration: Skin is jaundiced.   Neurological:      General: No focal deficit present.      Mental Status: He is alert and oriented to person, place, and time.   Psychiatric:         Mood and Affect: Mood normal.         Behavior: Behavior normal.            Diagnostic Data:  Lab Results (last 24 hours)         Procedure Component Value Units Date/Time     Hepatitis Panel, Acute [[id number]]  (Normal) Collected: 08/11/24 1620     Specimen: Blood Updated: 08/11/24 2027       Hepatitis B Surface Ag Non-Reactive       Hep A IgM Non-Reactive       Hep B C IgM Non-Reactive       Hepatitis C Ab Non-Reactive     Narrative:       Results may be falsely decreased if patient taking Biotin.      Urinalysis, Microscopic Only - Urine, Clean Catch [[id number]]  (Abnormal) Collected: 08/11/24 1510     Specimen: Urine, Clean Catch Updated: 08/11/24 1952       RBC, UA 6-10 /HPF         WBC, UA 0-2 /HPF         Comment: Urine culture not indicated.          Bacteria, UA None Seen /HPF         Squamous Epithelial Cells, UA 0-2 /HPF         Hyaline Casts, UA 0-2 /LPF         Methodology Automated Microscopy     Urinalysis With Culture If Indicated - Urine, Clean Catch [[id number]]  (Abnormal) Collected: 08/11/24 1510     Specimen: Urine, Clean Catch Updated: 08/11/24 1515       Color, UA Brown       Appearance, UA Turbid       pH, UA 5.5       Specific Gravity, UA 1.025       Glucose,  mg/dL (Trace)       Ketones, UA Trace       Bilirubin, UA Large (3+)       Blood, UA Moderate (2+)       Protein, UA 30 mg/dL (1+)       Leuk Esterase, UA Negative       Nitrite, UA Negative       Urobilinogen, UA 1.0 E.U./dL     Narrative:       In absence of clinical symptoms, the presence of pyuria,  bacteria, and/or nitrites on the urinalysis result does not correlate with infection.     [place] Urine Culture Tube - Urine, Clean Catch [[id number]] Collected: 08/11/24 1510     Specimen: Urine, Clean Catch Updated: 08/11/24 1515     Comprehensive Metabolic Panel [[id number]]  (Abnormal) Collected: 08/11/24 1449     Specimen: Blood Updated: 08/11/24 1513       Glucose 105 mg/dL         BUN 19 mg/dL         Creatinine 0.71 mg/dL         Sodium 134 mmol/L         Potassium 3.8 mmol/L         Chloride 103 mmol/L         CO2 24.8 mmol/L         Calcium 9.3 mg/dL         Total Protein 7.1 g/dL         Albumin 3.3 g/dL         ALT (SGPT) 206 U/L         AST (SGOT) 222 U/L         Alkaline Phosphatase 587 U/L         Total Bilirubin 12.8 mg/dL         Globulin 3.8 gm/dL         A/G Ratio 0.9 g/dL         BUN/Creatinine Ratio 26.8       Anion Gap 6.2 mmol/L         eGFR 105.7 mL/min/1.73       Narrative:       GFR Normal >60  Chronic Kidney Disease <60  Kidney Failure <15        Lipase [[id number]]  (Normal) Collected: 08/11/24 1449     Specimen: Blood Updated: 08/11/24 1513       Lipase 32 U/L       CBC & Differential [[id number]]  (Abnormal) Collected: 08/11/24 1449     Specimen: Blood Updated: 08/11/24 1455     Narrative:       The following orders were created for panel order CBC & Differential.  Procedure                               Abnormality         Status                     ---------                               -----------         ------                     CBC Auto Differential[[id number]]        Abnormal            Final result                  Please view results for these tests on the individual orders.     CBC Auto Differential [[id number]]  (Abnormal) Collected: 08/11/24 1449     Specimen: Blood Updated: 08/11/24 1455       WBC 8.90 10*3/mm3         RBC 4.46 10*6/mm3         Hemoglobin 13.0 g/dL         Hematocrit 41.0 %         MCV 91.9 fL         MCH 29.1 pg         MCHC 31.7 g/dL         RDW 18.2 %          RDW-SD 61.6 fl         MPV 10.2 fL         Platelets 433 10*3/mm3         Neutrophil % 57.5 %         Lymphocyte % 31.7 %         Monocyte % 8.4 %         Eosinophil % 1.9 %         Basophil % 0.2 %         Immature Grans % 0.3 %         Neutrophils, Absolute 5.11 10*3/mm3         Lymphocytes, Absolute 2.82 10*3/mm3         Monocytes, Absolute 0.75 10*3/mm3         Eosinophils, Absolute 0.17 10*3/mm3         Basophils, Absolute 0.02 10*3/mm3         Immature Grans, Absolute 0.03 10*3/mm3                             Imaging Results (Last 24 Hours)         Procedure Component Value Units Date/Time     CT Abdomen Pelvis With Contrast [[id number]] Collected: 08/11/24 1644       Updated: 08/11/24 1653     Narrative:       CT ABDOMEN PELVIS W CONTRAST     Date of Exam: 8/11/2024 4:30 PM EDT     Indication: Jaundice and upper abdominal pain..     Comparison: None available.     Technique: Axial CT images were obtained of the abdomen and pelvis following the uneventful intravenous administration of iodinated contrast. Sagittal and coronal reconstructions were performed.  Automated exposure control and iterative reconstruction   methods were used.           Findings:  Visualized Chest:  The visualized lung bases and lower mediastinal structures are unremarkable.     Liver: Liver is normal in size and CT density. No focal lesions.     Gallbladder: Significantly distended gallbladder. Possible small layering stones noted within the gallbladder lumen. Trace pericholecystic fluid.     Bile Ducts: Severe distention of the common bile duct measuring up to 2.1 cm. There is also extensive intrahepatic biliary ductal dilation. There is evidence of a possible obstructing lesion noted within the mid to distal common bile duct measuring up to   1.7 x 1.6 cm (image 48 of series 4). More distally the common bile duct appears to be patent (for example image 57 of series 4)     Spleen: Spleen is normal in size and CT density.     Pancreas:  No pancreatic ductal dilation. No evidence of acute inflammatory changes.     Adrenals: Adrenal glands are unremarkable.     Kidneys: Kidneys are normal in size. There are no stones or hydronephrosis.     Gastrointestinal: Mild wall thickening of the sigmoid colon is most likely due to underdistention. Colonic diverticulosis without evidence of acute diverticulitis.  Otherwise unremarkable. Normal appendix.     Bladder: The bladder is normal.     Pelvis:  No suspecious mass.     Peritoneum/Mesentery: No fluid collection, ascities, or free air.      Lymph Nodes: Multiple prominent to mildly enlarged upper para-aortic and hilary hepatis lymph nodes. The largest of these measures 1.3 cm on the short axis.     Vasculature: Unremarkable     Abdominal Wall: Unremarkable     Bony Structures: No acute osseous abnormality        Impression:       Impression:  Significant distention of the intrahepatic and extrahepatic bile ducts secondary to a possible obstructing lesion noted within the mid to distal common bile duct measuring 1.7 cm. Differentials include choledocholithiasis, primary biliary malignancy   versus pancreatic malignancy. Recommend follow-up with dedicated MRI pancreatic mass protocol with and without contrast with dedicated MRCP images.     Multiple prominent to mildly enlarged upper para-aortic and hilary hepatis lymph nodes, nonspecific.     There is evidence of developing acute cholecystitis secondary to biliary duct obstruction.                 Electronically Signed: Alexsander Antony DO    8/11/2024 4:51 PM EDT    Workstation ID: [id number]                   Assessment & Plan          This is a 58 y.o. male with:     Active and Resolved Problems            Active Hospital Problems     Diagnosis   POA    **Common bile duct obstruction [K83.1]   Yes    Choledocholithiasis [K80.50]   Yes       Resolved Hospital Problems   No resolved problems to display.         CBD obstruction, rule out biliary malignancy vs  choledocholithiasis   , , T. bili 12.8, alkaline phosphatase 587.  Patient reports ongoing choluria, acholia, postprandial fullness intentional weight loss over the past 4 weeks which raises concern for primary biliary malignancy.  MRCP will be obtained  GI consultation  Will maintain a  n.p.o. after midnight     History of HTN  Resume home dose norvasc        VTE Prophylaxis:  Pharmacologic VTE prophylaxis orders are present.           The patient desires to be as follows:     CODE STATUS:    Full code        Admission Status:  I believe this patient meets inpatient status.     Expected Length of Stay: 2-3     PDMP and Medication Dispenses via Sidebar reviewed and consistent with patient reported medications.     I discussed the patient's findings and my recommendations with patient and nursing staff.

## 2024-08-13 NOTE — DISCHARGE INSTRUCTIONS
Impression:  1.  Obstructive jaundice secondary to common bile duct polypoid lesions.  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     Recommendations:  1.  Regular diet  2.  Okay for discharge home today  3.  I recommend outpatient endoscopic ultrasound and ERCP with spyglass to further evaluate the polypoid lesions noted by ERCP  4.  CA 19-9 is notably 73  5.  If the ERCP with spyglass confirms premalignant polypoid lesions, he will benefit from referral to a hepatobiliary surgeon with bile duct resection.  6.  My office will make arrangements for follow-up endoscopy.

## 2024-08-13 NOTE — ANESTHESIA POSTPROCEDURE EVALUATION
Patient: Ulisses Rivera    Procedure Summary       Date: 08/13/24 Room / Location: [facility] ENDOSCOPY 2 / [facility] ENDOSCOPY    Anesthesia Start: 1116 Anesthesia Stop: 1221    Procedure: ENDOSCOPIC RETROGRADE CHOLANGIOPANCREATOGRAPHY WITH SPHINCTEROTOMY, BALLOON CLEARANCE OF BILE DUCT, AND BILIARY STENT PLACEMENT Diagnosis:       Jaundice      Abnormal CT of the abdomen      (Jaundice [R17])      (Abnormal CT of the abdomen [R93.5])    Surgeons: Royce Tomlinson MD Provider: Amos Beavers MD    Anesthesia Type: general ASA Status: 2            Anesthesia Type: general    Vitals  Vitals Value Taken Time   /58 08/13/24 1226   Temp     Pulse 73 08/13/24 1227   Resp     SpO2 96 % 08/13/24 1227   Vitals shown include unfiled device data.        Post Anesthesia Care and Evaluation    Patient location during evaluation: PACU  Patient participation: complete - patient participated  Level of consciousness: awake  Pain scale: See nurse's notes for pain score.  Pain management: adequate    Airway patency: patent  Anesthetic complications: No anesthetic complications  PONV Status: none  Cardiovascular status: acceptable  Respiratory status: acceptable and spontaneous ventilation  Hydration status: acceptable    Comments: Patient seen and examined postoperatively; vital signs stable; SpO2 greater than or equal to 90%; cardiopulmonary status stable; nausea/vomiting adequately controlled; pain adequately controlled; no apparent anesthesia complications; patient discharged from anesthesia care when discharge criteria were met

## 2024-08-13 NOTE — PROGRESS NOTES
"[facility] Medicine Services       Patient Name: Ulisses Rivera  : 1965  MRN: [id number]  Primary Care Physician:  Ulisses Agosto MD  Date of admission: 2024  Date and Time of Service: 2024 at 10:10 PM     Subjective       Chief Complaint: \"urine discoloration\"     History of Present Illness: Ulisses Rivera is a 58 y.o. male with a PMH of w hypertension who presented to [facility] on 2024 with choluria for the past 4 weeks of gradual onset associated with unintentional weight loss of around 20 pounds, acholia, intermittent epigastric pain and postprandial fullness.  Patient states he was initially treated for a possible urine infection for which she completed 7 days Bactrim.  He was at a  last Friday and was told by some of his friends that his skin looked yellow.  Reports intermittent nausea but denies vomiting, melena, hematemesis, fever, chills.  Denies smoking or prior history of malignancy.  Patient was initially evaluated at [facility] where his chemistry labs were suggestive of extrahepatic cholestasis with , , T. bili 12.8, alkaline phosphatase 587.  CBC labs grossly unremarkable.  CT abdomen shows significant distention of intra and extrahepatic biliary ducts with possible obstructing lesion in the mid to distal CBD measuring 1.7 cm.  He was subsequently sent here for MRCP and GI evaluation.     Review of Systems   Constitutional:  Positive for appetite change, fatigue and unexpected weight change. Negative for fever.   HENT:  Negative for ear pain and nosebleeds.    Eyes:  Negative for pain.   Respiratory:  Negative for chest tightness and shortness of breath.    Cardiovascular:  Negative for chest pain and leg swelling.   Gastrointestinal:  Positive for abdominal pain. Negative for abdominal distention and nausea.   Endocrine: Negative for polydipsia.   Genitourinary:  Negative for dysuria.   Musculoskeletal:  Negative for " back pain.   Skin:  Positive for color change.   Neurological:  Negative for dizziness.   Psychiatric/Behavioral:  Negative for confusion.        8/12  Pt admitted with obs jaundice'  Awaitng gi ev  Mrcp pending  Mild abd discomfort  Clinically stable otherwise    8/13  Patient is going for ERCP today  Doing fine no new complaint hemodynamically stable  Patient is full code  His ALT AST are still elevated at 190-179 little down from yesterday with bilirubin still elevated at 10    Personal History      Medical History        Past Medical History:   Diagnosis Date    Allergic When I was 1     Only seasonal allergies and allergic to some foods    Gout 2005     Only rarely    Hammer toe 2018    Hyperlipidemia      Hypertension              Surgical History         Past Surgical History:   Procedure Laterality Date    COLONOSCOPY                Family History: family history includes Cancer in his paternal grandfather; Heart attack (age of onset: 83) in his father; Heart disease in his father; Macular degeneration in his mother; No Known Problems in his maternal grandfather, maternal grandmother, and paternal grandmother; Skin cancer in his sister. Otherwise pertinent FHx was reviewed and not pertinent to current issue.     Social History:  reports that he has never smoked. He has never been exposed to tobacco smoke. He has never used smokeless tobacco. He reports current alcohol use of about 1.0 standard drink of alcohol per week. He reports that he does not use drugs.     Home Medications:  Prior to Admission Medications         Prescriptions Last Dose Informant Patient Reported? Taking?     amLODIPine (NORVASC) 10 MG tablet     No No     Take 1 tablet by mouth every night at bedtime.     atorvastatin (LIPITOR) 20 MG tablet     No No     Take 1 tablet by mouth every night at bedtime.     multivitamin with minerals tablet tablet     Yes No     Take 1 tablet by mouth Daily.                   Allergies:  Allergies          Allergies   Allergen Reactions    Nuts Nausea And Vomiting    Peanut-Containing Drug Products Anaphylaxis    Peanut Oil Other (See Comments)       Vomiting and an ill feeling            Objective       Vitals:   Temp:  [97.1 °F (36.2 °C)-98 °F (36.7 °C)] 97.1 °F (36.2 °C)  Heart Rate:  [60-78] 72  Resp:  [16-18] 16  BP: (127-144)/(55-96) 127/55  Body mass index is 29.17 kg/m².  Physical Exam  Constitutional:       General: He is not in acute distress.     Appearance: Normal appearance. He is not ill-appearing.   HENT:      Head: Normocephalic.      Nose: Nose normal.      Mouth/Throat:      Mouth: Mucous membranes are moist.   Eyes:      Extraocular Movements: Extraocular movements intact.      Pupils: Pupils are equal, round, and reactive to light.   Cardiovascular:      Rate and Rhythm: Normal rate.      Pulses: Normal pulses.      Heart sounds: Normal heart sounds. No murmur heard.  Pulmonary:      Effort: Pulmonary effort is normal. No respiratory distress.      Breath sounds: Normal breath sounds. No wheezing.   Abdominal:      General: Abdomen is flat. There is no distension.      Palpations: Abdomen is soft.      Tenderness: There is no abdominal tenderness. There is no guarding.      Comments: Abdul sign negative   Musculoskeletal:         General: Normal range of motion.      Cervical back: Normal range of motion.   Skin:     General: Skin is warm.      Coloration: Skin is jaundiced.   Neurological:      General: No focal deficit present.      Mental Status: He is alert and oriented to person, place, and time.   Psychiatric:         Mood and Affect: Mood normal.         Behavior: Behavior normal.            Diagnostic Data:  Lab Results (last 24 hours)         Procedure Component Value Units Date/Time     Hepatitis Panel, Acute [[id number]]  (Normal) Collected: 08/11/24 1620     Specimen: Blood Updated: 08/11/24 2027       Hepatitis B Surface Ag Non-Reactive       Hep A IgM Non-Reactive       Hep B C IgM  Non-Reactive       Hepatitis C Ab Non-Reactive     Narrative:       Results may be falsely decreased if patient taking Biotin.      Urinalysis, Microscopic Only - Urine, Clean Catch [[id number]]  (Abnormal) Collected: 08/11/24 1510     Specimen: Urine, Clean Catch Updated: 08/11/24 1952       RBC, UA 6-10 /HPF         WBC, UA 0-2 /HPF         Comment: Urine culture not indicated.          Bacteria, UA None Seen /HPF         Squamous Epithelial Cells, UA 0-2 /HPF         Hyaline Casts, UA 0-2 /LPF         Methodology Automated Microscopy     Urinalysis With Culture If Indicated - Urine, Clean Catch [[id number]]  (Abnormal) Collected: 08/11/24 1510     Specimen: Urine, Clean Catch Updated: 08/11/24 1515       Color, UA Brown       Appearance, UA Turbid       pH, UA 5.5       Specific Gravity, UA 1.025       Glucose,  mg/dL (Trace)       Ketones, UA Trace       Bilirubin, UA Large (3+)       Blood, UA Moderate (2+)       Protein, UA 30 mg/dL (1+)       Leuk Esterase, UA Negative       Nitrite, UA Negative       Urobilinogen, UA 1.0 E.U./dL     Narrative:       In absence of clinical symptoms, the presence of pyuria, bacteria, and/or nitrites on the urinalysis result does not correlate with infection.     [place] Urine Culture Tube - Urine, Clean Catch [[id number]] Collected: 08/11/24 1510     Specimen: Urine, Clean Catch Updated: 08/11/24 1515     Comprehensive Metabolic Panel [[id number]]  (Abnormal) Collected: 08/11/24 1449     Specimen: Blood Updated: 08/11/24 1513       Glucose 105 mg/dL         BUN 19 mg/dL         Creatinine 0.71 mg/dL         Sodium 134 mmol/L         Potassium 3.8 mmol/L         Chloride 103 mmol/L         CO2 24.8 mmol/L         Calcium 9.3 mg/dL         Total Protein 7.1 g/dL         Albumin 3.3 g/dL         ALT (SGPT) 206 U/L         AST (SGOT) 222 U/L         Alkaline Phosphatase 587 U/L         Total Bilirubin 12.8 mg/dL         Globulin 3.8 gm/dL         A/G Ratio 0.9 g/dL          BUN/Creatinine Ratio 26.8       Anion Gap 6.2 mmol/L         eGFR 105.7 mL/min/1.73       Narrative:       GFR Normal >60  Chronic Kidney Disease <60  Kidney Failure <15        Lipase [[id number]]  (Normal) Collected: 08/11/24 1449     Specimen: Blood Updated: 08/11/24 1513       Lipase 32 U/L       CBC & Differential [[id number]]  (Abnormal) Collected: 08/11/24 1449     Specimen: Blood Updated: 08/11/24 1455     Narrative:       The following orders were created for panel order CBC & Differential.  Procedure                               Abnormality         Status                     ---------                               -----------         ------                     CBC Auto Differential[[id number]]        Abnormal            Final result                  Please view results for these tests on the individual orders.     CBC Auto Differential [[id number]]  (Abnormal) Collected: 08/11/24 1449     Specimen: Blood Updated: 08/11/24 1455       WBC 8.90 10*3/mm3         RBC 4.46 10*6/mm3         Hemoglobin 13.0 g/dL         Hematocrit 41.0 %         MCV 91.9 fL         MCH 29.1 pg         MCHC 31.7 g/dL         RDW 18.2 %         RDW-SD 61.6 fl         MPV 10.2 fL         Platelets 433 10*3/mm3         Neutrophil % 57.5 %         Lymphocyte % 31.7 %         Monocyte % 8.4 %         Eosinophil % 1.9 %         Basophil % 0.2 %         Immature Grans % 0.3 %         Neutrophils, Absolute 5.11 10*3/mm3         Lymphocytes, Absolute 2.82 10*3/mm3         Monocytes, Absolute 0.75 10*3/mm3         Eosinophils, Absolute 0.17 10*3/mm3         Basophils, Absolute 0.02 10*3/mm3         Immature Grans, Absolute 0.03 10*3/mm3                             Imaging Results (Last 24 Hours)         Procedure Component Value Units Date/Time     CT Abdomen Pelvis With Contrast [[id number]] Collected: 08/11/24 1644       Updated: 08/11/24 1653     Narrative:       CT ABDOMEN PELVIS W CONTRAST     Date of Exam: 8/11/2024 4:30 PM EDT      Indication: Jaundice and upper abdominal pain..     Comparison: None available.     Technique: Axial CT images were obtained of the abdomen and pelvis following the uneventful intravenous administration of iodinated contrast. Sagittal and coronal reconstructions were performed.  Automated exposure control and iterative reconstruction   methods were used.           Findings:  Visualized Chest:  The visualized lung bases and lower mediastinal structures are unremarkable.     Liver: Liver is normal in size and CT density. No focal lesions.     Gallbladder: Significantly distended gallbladder. Possible small layering stones noted within the gallbladder lumen. Trace pericholecystic fluid.     Bile Ducts: Severe distention of the common bile duct measuring up to 2.1 cm. There is also extensive intrahepatic biliary ductal dilation. There is evidence of a possible obstructing lesion noted within the mid to distal common bile duct measuring up to   1.7 x 1.6 cm (image 48 of series 4). More distally the common bile duct appears to be patent (for example image 57 of series 4)     Spleen: Spleen is normal in size and CT density.     Pancreas: No pancreatic ductal dilation. No evidence of acute inflammatory changes.     Adrenals: Adrenal glands are unremarkable.     Kidneys: Kidneys are normal in size. There are no stones or hydronephrosis.     Gastrointestinal: Mild wall thickening of the sigmoid colon is most likely due to underdistention. Colonic diverticulosis without evidence of acute diverticulitis.  Otherwise unremarkable. Normal appendix.     Bladder: The bladder is normal.     Pelvis:  No suspecious mass.     Peritoneum/Mesentery: No fluid collection, ascities, or free air.      Lymph Nodes: Multiple prominent to mildly enlarged upper para-aortic and hilary hepatis lymph nodes. The largest of these measures 1.3 cm on the short axis.     Vasculature: Unremarkable     Abdominal Wall: Unremarkable     Bony Structures: No  acute osseous abnormality        Impression:       Impression:  Significant distention of the intrahepatic and extrahepatic bile ducts secondary to a possible obstructing lesion noted within the mid to distal common bile duct measuring 1.7 cm. Differentials include choledocholithiasis, primary biliary malignancy   versus pancreatic malignancy. Recommend follow-up with dedicated MRI pancreatic mass protocol with and without contrast with dedicated MRCP images.     Multiple prominent to mildly enlarged upper para-aortic and hilary hepatis lymph nodes, nonspecific.     There is evidence of developing acute cholecystitis secondary to biliary duct obstruction.                 Electronically Signed: Alexsander Antony DO    8/11/2024 4:51 PM EDT    Workstation ID: [id number]                   Assessment & Plan          This is a 58 y.o. male with:     Active and Resolved Problems        Active Hospital Problems     Diagnosis   POA    **Common bile duct obstruction [K83.1]   Yes    Choledocholithiasis [K80.50]   Yes       Resolved Hospital Problems   No resolved problems to display.         CBD obstruction, rule out biliary malignancy vs choledocholithiasis   , , T. bili 12.8, alkaline phosphatase 587.  Patient reports ongoing choluria, acholia, postprandial fullness intentional weight loss over the past 4 weeks which raises concern for primary biliary malignancy.  MRCP will be obtained  GI consultation  Will maintain a  n.p.o. after midnight     History of HTN  Resume home dose norvasc        VTE Prophylaxis:  Pharmacologic VTE prophylaxis orders are present.           The patient desires to be as follows:     CODE STATUS:    Full code        Admission Status:  I believe this patient meets inpatient status.     Expected Length of Stay: 2-3     PDMP and Medication Dispenses via Sidebar reviewed and consistent with patient reported medications.     I discussed the patient's findings and my recommendations with  patient and nursing staff.        Signature:

## 2024-08-13 NOTE — ANESTHESIA PREPROCEDURE EVALUATION
Anesthesia Evaluation     NPO Solid Status: > 8 hours  NPO Liquid Status: > 8 hours           Airway   Mallampati: II  TM distance: >3 FB  Neck ROM: full  No difficulty expected  Dental - normal exam     Pulmonary - normal exam   Cardiovascular - normal exam    (+) hypertension well controlled, hyperlipidemia      Neuro/Psych  GI/Hepatic/Renal/Endo      Musculoskeletal     Abdominal  - normal exam    Bowel sounds: normal.   Substance History      OB/GYN          Other                    Anesthesia Plan    ASA 2     general     intravenous induction     Anesthetic plan, risks, benefits, and alternatives have been provided, discussed and informed consent has been obtained with: patient.  Pre-procedure education provided  Plan discussed with CRNA.    CODE STATUS:    Level Of Support Discussed With: Patient  Code Status (Patient has no pulse and is not breathing): CPR (Attempt to Resuscitate)  Medical Interventions (Patient has pulse or is breathing): Full Support

## 2024-08-13 NOTE — PLAN OF CARE
Problem: Adult Inpatient Plan of Care  Goal: Plan of Care Review  Outcome: Ongoing, Progressing  Flowsheets (Taken 8/13/2024 0319)  Progress: no change  Plan of Care Reviewed With: patient  Goal: Patient-Specific Goal (Individualized)  Outcome: Ongoing, Progressing  Goal: Absence of Hospital-Acquired Illness or Injury  Outcome: Ongoing, Progressing  Intervention: Identify and Manage Fall Risk  Recent Flowsheet Documentation  Taken 8/13/2024 0200 by Minnie Montgomery RN  Safety Promotion/Fall Prevention:   activity supervised   assistive device/personal items within reach   clutter free environment maintained   safety round/check completed  Taken 8/12/2024 2350 by Minnie Montgomery RN  Safety Promotion/Fall Prevention:   activity supervised   assistive device/personal items within reach   clutter free environment maintained   safety round/check completed  Taken 8/12/2024 2200 by Minnie Montgomery RN  Safety Promotion/Fall Prevention:   activity supervised   assistive device/personal items within reach   clutter free environment maintained   safety round/check completed  Taken 8/12/2024 1950 by Minnie Montgomery RN  Safety Promotion/Fall Prevention:   activity supervised   assistive device/personal items within reach   clutter free environment maintained   safety round/check completed  Intervention: Prevent Skin Injury  Recent Flowsheet Documentation  Taken 8/13/2024 0200 by Minnie Montgomery RN  Body Position: position changed independently  Taken 8/12/2024 2350 by Minnie Montgomery RN  Body Position: position changed independently  Skin Protection: adhesive use limited  Taken 8/12/2024 2200 by Minnie Montgomery RN  Body Position: position changed independently  Taken 8/12/2024 1950 by Minnie Montgomery RN  Body Position: position changed independently  Skin Protection: adhesive use limited  Intervention: Prevent and Manage VTE (Venous Thromboembolism) Risk  Recent Flowsheet Documentation  Taken 8/13/2024 0200 by Minnie Montgomery  RN  Activity Management: ambulated to bathroom  Taken 8/12/2024 2350 by Minnie Montgomery RN  Activity Management: ambulated to bathroom  Taken 8/12/2024 2200 by Minnie Montgomery RN  Activity Management: ambulated to bathroom  Taken 8/12/2024 1950 by Minnie Montgomery RN  Activity Management: ambulated to bathroom  Range of Motion: active ROM (range of motion) encouraged  Intervention: Prevent Infection  Recent Flowsheet Documentation  Taken 8/13/2024 0200 by Minnie Montgomery RN  Infection Prevention: hand hygiene promoted  Taken 8/12/2024 2350 by Minnie Montgomery RN  Infection Prevention: hand hygiene promoted  Taken 8/12/2024 2200 by Minnie Montgomery RN  Infection Prevention: hand hygiene promoted  Taken 8/12/2024 1950 by Minnie Montgomery RN  Infection Prevention: hand hygiene promoted  Goal: Optimal Comfort and Wellbeing  Outcome: Ongoing, Progressing  Intervention: Provide Person-Centered Care  Recent Flowsheet Documentation  Taken 8/12/2024 2350 by Minnie Montgomery RN  Trust Relationship/Rapport:   care explained   choices provided   emotional support provided   empathic listening provided   questions answered   questions encouraged   reassurance provided   thoughts/feelings acknowledged  Taken 8/12/2024 1950 by Minnie Montgomery RN  Trust Relationship/Rapport:   care explained   choices provided   emotional support provided   empathic listening provided   questions answered   questions encouraged   reassurance provided   thoughts/feelings acknowledged  Goal: Readiness for Transition of Care  Outcome: Ongoing, Progressing     Problem: Asthma Comorbidity  Goal: Maintenance of Asthma Control  Outcome: Ongoing, Progressing  Intervention: Maintain Asthma Symptom Control  Recent Flowsheet Documentation  Taken 8/13/2024 0200 by Minnie Montgomery RN  Medication Review/Management: medications reviewed  Taken 8/12/2024 2350 by Minnie Montgomery RN  Medication Review/Management: medications reviewed  Taken 8/12/2024 2200 by Ulises  CARLENE Hartman  Medication Review/Management: medications reviewed  Taken 8/12/2024 1950 by Minnie Montgomery RN  Medication Review/Management: medications reviewed     Problem: Behavioral Health Comorbidity  Goal: Maintenance of Behavioral Health Symptom Control  Outcome: Ongoing, Progressing  Intervention: Maintain Behavioral Health Symptom Control  Recent Flowsheet Documentation  Taken 8/13/2024 0200 by Minnie Montgomery RN  Medication Review/Management: medications reviewed  Taken 8/12/2024 2350 by Minnie Montgomery RN  Medication Review/Management: medications reviewed  Taken 8/12/2024 2200 by Minnie Montgomery RN  Medication Review/Management: medications reviewed  Taken 8/12/2024 1950 by Minnie Montgomery RN  Medication Review/Management: medications reviewed     Problem: COPD (Chronic Obstructive Pulmonary Disease) Comorbidity  Goal: Maintenance of COPD Symptom Control  Outcome: Ongoing, Progressing  Intervention: Maintain COPD-Symptom Control  Recent Flowsheet Documentation  Taken 8/13/2024 0200 by Minnie Montgomery RN  Medication Review/Management: medications reviewed  Taken 8/12/2024 2350 by Minnie Montgomery RN  Supportive Measures: active listening utilized  Medication Review/Management: medications reviewed  Taken 8/12/2024 2200 by Minnie Montgomery RN  Medication Review/Management: medications reviewed  Taken 8/12/2024 1950 by Minnie Montgomery RN  Supportive Measures: active listening utilized  Medication Review/Management: medications reviewed     Problem: Diabetes Comorbidity  Goal: Blood Glucose Level Within Targeted Range  Outcome: Ongoing, Progressing     Problem: Heart Failure Comorbidity  Goal: Maintenance of Heart Failure Symptom Control  Outcome: Ongoing, Progressing  Intervention: Maintain Heart Failure-Management  Recent Flowsheet Documentation  Taken 8/13/2024 0200 by Minnie Montgomery RN  Medication Review/Management: medications reviewed  Taken 8/12/2024 2350 by Minnie Montgomery RN  Medication  Review/Management: medications reviewed  Taken 8/12/2024 2200 by Minnie Montgomery RN  Medication Review/Management: medications reviewed  Taken 8/12/2024 1950 by Minnie Montgomery RN  Medication Review/Management: medications reviewed     Problem: Hypertension Comorbidity  Goal: Blood Pressure in Desired Range  Outcome: Ongoing, Progressing  Intervention: Maintain Blood Pressure Management  Recent Flowsheet Documentation  Taken 8/13/2024 0200 by Minnie Montgomery RN  Medication Review/Management: medications reviewed  Taken 8/12/2024 2350 by Minnie Montgomery RN  Syncope Management: position changed slowly  Medication Review/Management: medications reviewed  Taken 8/12/2024 2200 by Minnie Montgomery RN  Medication Review/Management: medications reviewed  Taken 8/12/2024 1950 by Minnie Montgomery RN  Syncope Management: position changed slowly  Medication Review/Management: medications reviewed     Problem: Obstructive Sleep Apnea Risk or Actual Comorbidity Management  Goal: Unobstructed Breathing During Sleep  Outcome: Ongoing, Progressing     Problem: Osteoarthritis Comorbidity  Goal: Maintenance of Osteoarthritis Symptom Control  Outcome: Ongoing, Progressing  Intervention: Maintain Osteoarthritis Symptom Control  Recent Flowsheet Documentation  Taken 8/13/2024 0200 by Minnie Montgomery RN  Activity Management: ambulated to bathroom  Medication Review/Management: medications reviewed  Taken 8/12/2024 2350 by Minnie Montgomery RN  Activity Management: ambulated to bathroom  Medication Review/Management: medications reviewed  Taken 8/12/2024 2200 by Minnie Montgomery RN  Activity Management: ambulated to bathroom  Medication Review/Management: medications reviewed  Taken 8/12/2024 1950 by Minnie Montgomery RN  Activity Management: ambulated to bathroom  Medication Review/Management: medications reviewed     Problem: Pain Chronic (Persistent) (Comorbidity Management)  Goal: Acceptable Pain Control and Functional Ability  Outcome:  Ongoing, Progressing  Intervention: Manage Persistent Pain  Recent Flowsheet Documentation  Taken 8/13/2024 0200 by Minnie Montgomery RN  Medication Review/Management: medications reviewed  Taken 8/12/2024 2350 by Minnie Montgomery RN  Bowel Elimination Promotion: adequate fluid intake promoted  Sleep/Rest Enhancement: awakenings minimized  Medication Review/Management: medications reviewed  Taken 8/12/2024 2200 by Minnie Montgomery RN  Medication Review/Management: medications reviewed  Taken 8/12/2024 1950 by Minnie Montgomery RN  Bowel Elimination Promotion: adequate fluid intake promoted  Sleep/Rest Enhancement: awakenings minimized  Medication Review/Management: medications reviewed  Intervention: Optimize Psychosocial Wellbeing  Recent Flowsheet Documentation  Taken 8/12/2024 2350 by Minnie Montgomery RN  Supportive Measures: active listening utilized  Diversional Activities: television  Taken 8/12/2024 1950 by Minnie Montgomery RN  Supportive Measures: active listening utilized     Problem: Seizure Disorder Comorbidity  Goal: Maintenance of Seizure Control  Outcome: Ongoing, Progressing  Intervention: Maintain Seizure-Symptom Control  Recent Flowsheet Documentation  Taken 8/13/2024 0200 by Minnie Montgomery RN  Seizure Precautions: clutter-free environment maintained  Taken 8/12/2024 2350 by Minnie Montgomery RN  Seizure Precautions: clutter-free environment maintained  Taken 8/12/2024 2200 by Minnie Montgomery RN  Seizure Precautions: clutter-free environment maintained  Taken 8/12/2024 1950 by Minnie Montgomery RN  Seizure Precautions: clutter-free environment maintained   Goal Outcome Evaluation:  Plan of Care Reviewed With: patient        Progress: no change

## 2024-08-14 ENCOUNTER — TRANSITIONAL CARE MANAGEMENT TELEPHONE ENCOUNTER (OUTPATIENT)
Dept: CALL CENTER | Facility: HOSPITAL | Age: 59
End: 2024-08-14
Payer: COMMERCIAL

## 2024-08-14 LAB
LAB AP CASE REPORT: NORMAL
LAB AP DIAGNOSIS COMMENT: NORMAL
PATH REPORT.FINAL DX SPEC: NORMAL
PATH REPORT.GROSS SPEC: NORMAL

## 2024-08-14 NOTE — OUTREACH NOTE
Call Center TCM Note      Flowsheet Row Responses   [facility] facility patient discharged from? [place]   Does the patient have one of the following disease processes/diagnoses(primary or secondary)? General Surgery   TCM attempt successful? No   Unsuccessful attempts Attempt 1            Joyce Corea MA    8/14/2024, 13:55 EDT

## 2024-08-14 NOTE — CASE MANAGEMENT/SOCIAL WORK
Case Management Discharge Note      Final Note: Home         Selected Continued Care - Discharged on 8/13/2024 Admission date: 8/11/2024 - Discharge disposition: Home or Self Care         Transportation Services  Private: Car    Final Discharge Disposition Code: 01 - home or self-care      BRUCE Garvin RN  SIPS/ICU   O: [phone]  C: [phone]  [email or web address]

## 2024-08-14 NOTE — OUTREACH NOTE
Call Center TCM Note      Flowsheet Row Responses   [facility] facility patient discharged from? [place]   Does the patient have one of the following disease processes/diagnoses(primary or secondary)? General Surgery   TCM attempt successful? No   Unsuccessful attempts Attempt 2            Joyce Corea MA    8/14/2024, 16:07 EDT

## 2024-08-14 NOTE — PAYOR COMM NOTE
"  DISCHARGE NOTICE --  [id number]       This patient discharged HOME on 8/13/24 .  Please advise if additional information is needed to finalize this request.    Thank you!      Eugenia Crane  Utilization Review Coordinator  [address]  [address]  Ph: [phone]  Fx: [phone]      Rupa Rivera \"Adolfo\" (58 y.o. Male)       Date of Birth   1965    Social Security Number       Address   [address] [place] [address]    Home Phone   [phone]    MRN   [id number]       Restoration[employer]   [employer]    Marital Status   Single                            Admission Date   8/11/24    Admission Type   Emergency    Admitting Provider   Llanes Alvarez, Carlos, MD    Attending Provider       Department, Room/Bed   [facility], 1108/1       Discharge Date   8/13/2024    Discharge Disposition   Home or Self Care    Discharge Destination                                 Attending Provider: (none)   Allergies: Nuts, Peanut-containing Drug Products, Peanut Oil    Isolation: None   Infection: None   Code Status: Prior    Ht: 190.5 cm (75\")   Wt: 104 kg (229 lb 4.5 oz)    Admission Cmt: None   Principal Problem: Common bile duct obstruction [K83.1]                   Active Insurance as of 8/11/2024       Primary Coverage       Payor Plan Insurance Group Employer/Plan Group    [employer]CIAL [employer] [id number]       Payor Plan Address Payor Plan Phone Number Payor Plan Fax Number Effective Dates    [address] [address]   1/1/2023 - None Entered    [place] [id number]         Subscriber Name Subscriber Birth Date Member ID       RUPA RIVERA 1965 [id number]                     Emergency Contacts        (Rel.) Home Phone Work Phone Mobile Phone    House, Vo (Mother) [phone] -- --          "

## 2024-08-15 ENCOUNTER — TRANSITIONAL CARE MANAGEMENT TELEPHONE ENCOUNTER (OUTPATIENT)
Dept: CALL CENTER | Facility: HOSPITAL | Age: 59
End: 2024-08-15
Payer: COMMERCIAL

## 2024-08-15 NOTE — OUTREACH NOTE
Call Center TCM Note      Flowsheet Row Responses   [facility] patient discharged from? [place]   Does the patient have one of the following disease processes/diagnoses(primary or secondary)? General Surgery   TCM attempt successful? Yes   Call start time 1017   Call end time 1020   Discharge diagnosis Common bile duct obstruction   Meds reviewed with patient/caregiver? Yes   Is the patient having any side effects they believe may be caused by any medication additions or changes? No   Does the patient have all medications related to this admission filled (includes all antibiotics, pain medications, etc.) N/A   Is the patient taking all medications as directed (includes completed medication regime)? Yes   Does the patient have an appointment with their PCP within 7-14 days of discharge? No   Nursing Interventions Patient declined scheduling/rescheduling appointment at this time   Has home health visited the patient within 72 hours of discharge? N/A   Psychosocial issues? No   What is the patient's perception of their health status since discharge? Improving   Is the patient/caregiver able to teach back the hierarchy of who to call/visit for symptoms/problems? PCP, Specialist, Home health nurse, Urgent Care, ED, 911 Yes   TCM call completed? Yes   Call end time 1020   Would this patient benefit from a Referral to Amb Social Work? No   Is the patient interested in additional calls from an ambulatory ? No            Meche Herring RN    8/15/2024, 10:20 EDT

## 2024-08-17 ENCOUNTER — HOSPITAL ENCOUNTER (OUTPATIENT)
Dept: CARDIOLOGY | Facility: HOSPITAL | Age: 59
Discharge: HOME | End: 2024-08-17
Payer: COMMERCIAL

## 2024-08-17 ENCOUNTER — LAB (OUTPATIENT)
Dept: LAB | Facility: HOSPITAL | Age: 59
End: 2024-08-17
Payer: COMMERCIAL

## 2024-08-17 LAB
ANION GAP SERPL CALCULATED.3IONS-SCNC: 11.6 MMOL/L (ref 5–15)
BUN SERPL-MCNC: 21 MG/DL (ref 6–20)
BUN/CREAT SERPL: 21.9 (ref 7–25)
CALCIUM SPEC-SCNC: 9.6 MG/DL (ref 8.6–10.5)
CHLORIDE SERPL-SCNC: 100 MMOL/L (ref 98–107)
CO2 SERPL-SCNC: 26.4 MMOL/L (ref 22–29)
CREAT SERPL-MCNC: 0.96 MG/DL (ref 0.76–1.27)
DEPRECATED RDW RBC AUTO: 60.2 FL (ref 37–54)
EGFRCR SERPLBLD CKD-EPI 2021: 91.1 ML/MIN/1.73
ERYTHROCYTE [DISTWIDTH] IN BLOOD BY AUTOMATED COUNT: 17.2 % (ref 12.3–15.4)
GLUCOSE SERPL-MCNC: 97 MG/DL (ref 65–99)
HCT VFR BLD AUTO: 37.1 % (ref 37.5–51)
HGB BLD-MCNC: 11.8 G/DL (ref 13–17.7)
MCH RBC QN AUTO: 30.3 PG (ref 26.6–33)
MCHC RBC AUTO-ENTMCNC: 31.8 G/DL (ref 31.5–35.7)
MCV RBC AUTO: 95.1 FL (ref 79–97)
PLATELET # BLD AUTO: 422 10*3/MM3 (ref 140–450)
PMV BLD AUTO: 10.3 FL (ref 6–12)
POTASSIUM SERPL-SCNC: 4.4 MMOL/L (ref 3.5–5.2)
RBC # BLD AUTO: 3.9 10*6/MM3 (ref 4.14–5.8)
SODIUM SERPL-SCNC: 138 MMOL/L (ref 136–145)
WBC NRBC COR # BLD AUTO: 9.35 10*3/MM3 (ref 3.4–10.8)

## 2024-08-17 PROCEDURE — 80048 BASIC METABOLIC PNL TOTAL CA: CPT | Performed by: INTERNAL MEDICINE

## 2024-08-17 PROCEDURE — 36415 COLL VENOUS BLD VENIPUNCTURE: CPT | Performed by: INTERNAL MEDICINE

## 2024-08-17 PROCEDURE — 85027 COMPLETE CBC AUTOMATED: CPT | Performed by: INTERNAL MEDICINE

## 2024-08-21 ENCOUNTER — APPOINTMENT (OUTPATIENT)
Dept: GENERAL RADIOLOGY | Facility: HOSPITAL | Age: 59
End: 2024-08-21
Payer: COMMERCIAL

## 2024-08-21 ENCOUNTER — ON CAMPUS - OUTPATIENT (AMBULATORY)
Age: 59
End: 2024-08-21
Payer: COMMERCIAL

## 2024-08-21 ENCOUNTER — ANESTHESIA (OUTPATIENT)
Dept: GASTROENTEROLOGY | Facility: HOSPITAL | Age: 59
End: 2024-08-21
Payer: COMMERCIAL

## 2024-08-21 ENCOUNTER — ANESTHESIA EVENT (OUTPATIENT)
Dept: GASTROENTEROLOGY | Facility: HOSPITAL | Age: 59
End: 2024-08-21
Payer: COMMERCIAL

## 2024-08-21 ENCOUNTER — HOSPITAL ENCOUNTER (OUTPATIENT)
Facility: HOSPITAL | Age: 59
Setting detail: HOSPITAL OUTPATIENT SURGERY
Discharge: HOME | End: 2024-08-21
Attending: INTERNAL MEDICINE | Admitting: INTERNAL MEDICINE
Payer: COMMERCIAL

## 2024-08-21 ENCOUNTER — ON CAMPUS - OUTPATIENT (AMBULATORY)
Dept: URBAN - METROPOLITAN AREA HOSPITAL 85 | Facility: HOSPITAL | Age: 59
End: 2024-08-21
Payer: COMMERCIAL

## 2024-08-21 VITALS
SYSTOLIC BLOOD PRESSURE: 129 MMHG | HEIGHT: 75 IN | DIASTOLIC BLOOD PRESSURE: 80 MMHG | OXYGEN SATURATION: 99 % | BODY MASS INDEX: 29.17 KG/M2 | RESPIRATION RATE: 13 BRPM | TEMPERATURE: 97.7 F | WEIGHT: 234.6 LBS | HEART RATE: 55 BPM

## 2024-08-21 DIAGNOSIS — R17 JAUNDICE: ICD-10-CM

## 2024-08-21 DIAGNOSIS — R17 UNSPECIFIED JAUNDICE: ICD-10-CM

## 2024-08-21 DIAGNOSIS — K86.9 DISEASE OF PANCREAS, UNSPECIFIED: ICD-10-CM

## 2024-08-21 PROCEDURE — 25010000002 SUCCINYLCHOLINE PER 20 MG: Performed by: NURSE ANESTHETIST, CERTIFIED REGISTERED

## 2024-08-21 PROCEDURE — 25010000002 PROPOFOL 200 MG/20ML EMULSION: Performed by: NURSE ANESTHETIST, CERTIFIED REGISTERED

## 2024-08-21 PROCEDURE — C1769 GUIDE WIRE: HCPCS | Performed by: INTERNAL MEDICINE

## 2024-08-21 PROCEDURE — 43274 ERCP DUCT STENT PLACEMENT: CPT | Performed by: INTERNAL MEDICINE

## 2024-08-21 PROCEDURE — 25810000003 SODIUM CHLORIDE 0.9 % SOLUTION: Performed by: ANESTHESIOLOGY

## 2024-08-21 PROCEDURE — 25010000002 METRONIDAZOLE 500 MG/100ML SOLUTION: Performed by: NURSE ANESTHETIST, CERTIFIED REGISTERED

## 2024-08-21 PROCEDURE — C2625 STENT, NON-COR, TEM W/DEL SY: HCPCS | Performed by: INTERNAL MEDICINE

## 2024-08-21 PROCEDURE — 43242 EGD US FINE NEEDLE BX/ASPIR: CPT | Performed by: INTERNAL MEDICINE

## 2024-08-21 PROCEDURE — 88172 CYTP DX EVAL FNA 1ST EA SITE: CPT | Performed by: INTERNAL MEDICINE

## 2024-08-21 PROCEDURE — 25010000002 GLYCOPYRROLATE 0.2 MG/ML SOLUTION: Performed by: NURSE ANESTHETIST, CERTIFIED REGISTERED

## 2024-08-21 PROCEDURE — 25010000002 SUGAMMADEX 200 MG/2ML SOLUTION: Performed by: NURSE ANESTHETIST, CERTIFIED REGISTERED

## 2024-08-21 PROCEDURE — 88173 CYTOPATH EVAL FNA REPORT: CPT | Performed by: INTERNAL MEDICINE

## 2024-08-21 PROCEDURE — 25010000002 FENTANYL CITRATE (PF) 100 MCG/2ML SOLUTION: Performed by: NURSE ANESTHETIST, CERTIFIED REGISTERED

## 2024-08-21 PROCEDURE — 25010000002 ONDANSETRON PER 1 MG: Performed by: NURSE ANESTHETIST, CERTIFIED REGISTERED

## 2024-08-21 PROCEDURE — 88177 CYTP FNA EVAL EA ADDL: CPT | Performed by: INTERNAL MEDICINE

## 2024-08-21 PROCEDURE — 25010000002 DEXAMETHASONE SODIUM PHOSPHATE 20 MG/5ML SOLUTION: Performed by: NURSE ANESTHETIST, CERTIFIED REGISTERED

## 2024-08-21 PROCEDURE — 88305 TISSUE EXAM BY PATHOLOGIST: CPT | Performed by: INTERNAL MEDICINE

## 2024-08-21 PROCEDURE — 74330 X-RAY BILE/PANC ENDOSCOPY: CPT

## 2024-08-21 DEVICE — BILIARY STENT WITH NAVIFLEXTM RX DELIVERY SYSTEM
Type: IMPLANTABLE DEVICE | Site: BILE DUCT | Status: FUNCTIONAL
Brand: ADVANIX™ BILIARY

## 2024-08-21 RX ORDER — HYDRALAZINE HYDROCHLORIDE 20 MG/ML
5 INJECTION INTRAMUSCULAR; INTRAVENOUS
Status: DISCONTINUED | OUTPATIENT
Start: 2024-08-21 | End: 2024-08-21 | Stop reason: HOSPADM

## 2024-08-21 RX ORDER — ONDANSETRON 2 MG/ML
INJECTION INTRAMUSCULAR; INTRAVENOUS AS NEEDED
Status: DISCONTINUED | OUTPATIENT
Start: 2024-08-21 | End: 2024-08-21 | Stop reason: SURG

## 2024-08-21 RX ORDER — ONDANSETRON 2 MG/ML
4 INJECTION INTRAMUSCULAR; INTRAVENOUS ONCE AS NEEDED
Status: DISCONTINUED | OUTPATIENT
Start: 2024-08-21 | End: 2024-08-21 | Stop reason: HOSPADM

## 2024-08-21 RX ORDER — OXYCODONE HYDROCHLORIDE 5 MG/1
10 TABLET ORAL EVERY 4 HOURS PRN
Status: DISCONTINUED | OUTPATIENT
Start: 2024-08-21 | End: 2024-08-21 | Stop reason: HOSPADM

## 2024-08-21 RX ORDER — LABETALOL HYDROCHLORIDE 5 MG/ML
5 INJECTION, SOLUTION INTRAVENOUS
Status: DISCONTINUED | OUTPATIENT
Start: 2024-08-21 | End: 2024-08-21 | Stop reason: HOSPADM

## 2024-08-21 RX ORDER — DIPHENHYDRAMINE HYDROCHLORIDE 50 MG/ML
12.5 INJECTION INTRAMUSCULAR; INTRAVENOUS
Status: DISCONTINUED | OUTPATIENT
Start: 2024-08-21 | End: 2024-08-21 | Stop reason: HOSPADM

## 2024-08-21 RX ORDER — SUCCINYLCHOLINE CHLORIDE 20 MG/ML
INJECTION INTRAMUSCULAR; INTRAVENOUS AS NEEDED
Status: DISCONTINUED | OUTPATIENT
Start: 2024-08-21 | End: 2024-08-21 | Stop reason: SURG

## 2024-08-21 RX ORDER — DIPHENHYDRAMINE HYDROCHLORIDE 50 MG/ML
12.5 INJECTION INTRAMUSCULAR; INTRAVENOUS ONCE AS NEEDED
Status: DISCONTINUED | OUTPATIENT
Start: 2024-08-21 | End: 2024-08-21 | Stop reason: HOSPADM

## 2024-08-21 RX ORDER — IPRATROPIUM BROMIDE AND ALBUTEROL SULFATE 2.5; .5 MG/3ML; MG/3ML
3 SOLUTION RESPIRATORY (INHALATION) ONCE AS NEEDED
Status: DISCONTINUED | OUTPATIENT
Start: 2024-08-21 | End: 2024-08-21 | Stop reason: HOSPADM

## 2024-08-21 RX ORDER — OXYCODONE HYDROCHLORIDE 5 MG/1
5 TABLET ORAL ONCE AS NEEDED
Status: DISCONTINUED | OUTPATIENT
Start: 2024-08-21 | End: 2024-08-21 | Stop reason: HOSPADM

## 2024-08-21 RX ORDER — NALOXONE HCL 0.4 MG/ML
0.4 VIAL (ML) INJECTION AS NEEDED
Status: DISCONTINUED | OUTPATIENT
Start: 2024-08-21 | End: 2024-08-21 | Stop reason: HOSPADM

## 2024-08-21 RX ORDER — LIDOCAINE HYDROCHLORIDE 10 MG/ML
INJECTION, SOLUTION EPIDURAL; INFILTRATION; INTRACAUDAL; PERINEURAL AS NEEDED
Status: DISCONTINUED | OUTPATIENT
Start: 2024-08-21 | End: 2024-08-21 | Stop reason: SURG

## 2024-08-21 RX ORDER — FENTANYL CITRATE 50 UG/ML
INJECTION, SOLUTION INTRAMUSCULAR; INTRAVENOUS AS NEEDED
Status: DISCONTINUED | OUTPATIENT
Start: 2024-08-21 | End: 2024-08-21 | Stop reason: SURG

## 2024-08-21 RX ORDER — GLYCOPYRROLATE 0.2 MG/ML
INJECTION INTRAMUSCULAR; INTRAVENOUS AS NEEDED
Status: DISCONTINUED | OUTPATIENT
Start: 2024-08-21 | End: 2024-08-21 | Stop reason: SURG

## 2024-08-21 RX ORDER — DEXMEDETOMIDINE HYDROCHLORIDE 100 UG/ML
INJECTION, SOLUTION INTRAVENOUS AS NEEDED
Status: DISCONTINUED | OUTPATIENT
Start: 2024-08-21 | End: 2024-08-21 | Stop reason: SURG

## 2024-08-21 RX ORDER — EPHEDRINE SULFATE 5 MG/ML
5 INJECTION INTRAVENOUS ONCE AS NEEDED
Status: DISCONTINUED | OUTPATIENT
Start: 2024-08-21 | End: 2024-08-21 | Stop reason: HOSPADM

## 2024-08-21 RX ORDER — ROCURONIUM BROMIDE 10 MG/ML
INJECTION, SOLUTION INTRAVENOUS AS NEEDED
Status: DISCONTINUED | OUTPATIENT
Start: 2024-08-21 | End: 2024-08-21 | Stop reason: SURG

## 2024-08-21 RX ORDER — METRONIDAZOLE 500 MG/100ML
INJECTION, SOLUTION INTRAVENOUS AS NEEDED
Status: DISCONTINUED | OUTPATIENT
Start: 2024-08-21 | End: 2024-08-21 | Stop reason: SURG

## 2024-08-21 RX ORDER — DEXAMETHASONE SODIUM PHOSPHATE 4 MG/ML
INJECTION, SOLUTION INTRA-ARTICULAR; INTRALESIONAL; INTRAMUSCULAR; INTRAVENOUS; SOFT TISSUE AS NEEDED
Status: DISCONTINUED | OUTPATIENT
Start: 2024-08-21 | End: 2024-08-21 | Stop reason: SURG

## 2024-08-21 RX ORDER — PROPOFOL 10 MG/ML
INJECTION, EMULSION INTRAVENOUS AS NEEDED
Status: DISCONTINUED | OUTPATIENT
Start: 2024-08-21 | End: 2024-08-21 | Stop reason: SURG

## 2024-08-21 RX ORDER — SODIUM CHLORIDE 9 MG/ML
50 INJECTION, SOLUTION INTRAVENOUS CONTINUOUS
Status: DISCONTINUED | OUTPATIENT
Start: 2024-08-21 | End: 2024-08-21 | Stop reason: HOSPADM

## 2024-08-21 RX ADMIN — PROPOFOL 180 MG: 10 INJECTION, EMULSION INTRAVENOUS at 12:55

## 2024-08-21 RX ADMIN — DEXAMETHASONE SODIUM PHOSPHATE 8 MG: 4 INJECTION, SOLUTION INTRAMUSCULAR; INTRAVENOUS at 13:01

## 2024-08-21 RX ADMIN — FENTANYL CITRATE 50 MCG: 50 INJECTION, SOLUTION INTRAMUSCULAR; INTRAVENOUS at 12:55

## 2024-08-21 RX ADMIN — DEXMEDETOMIDINE HYDROCHLORIDE 2 MCG: 100 INJECTION, SOLUTION INTRAVENOUS at 14:15

## 2024-08-21 RX ADMIN — LIDOCAINE HYDROCHLORIDE 50 MG: 10 INJECTION, SOLUTION EPIDURAL; INFILTRATION; INTRACAUDAL; PERINEURAL at 12:55

## 2024-08-21 RX ADMIN — DEXMEDETOMIDINE HYDROCHLORIDE 2 MCG: 100 INJECTION, SOLUTION INTRAVENOUS at 14:10

## 2024-08-21 RX ADMIN — ROCURONIUM BROMIDE 20 MG: 10 INJECTION, SOLUTION INTRAVENOUS at 13:20

## 2024-08-21 RX ADMIN — METRONIDAZOLE 500 MG: 500 INJECTION, SOLUTION INTRAVENOUS at 14:27

## 2024-08-21 RX ADMIN — DEXMEDETOMIDINE HYDROCHLORIDE 2 MCG: 100 INJECTION, SOLUTION INTRAVENOUS at 14:05

## 2024-08-21 RX ADMIN — ROCURONIUM BROMIDE 10 MG: 10 INJECTION, SOLUTION INTRAVENOUS at 13:50

## 2024-08-21 RX ADMIN — SUCCINYLCHOLINE CHLORIDE 140 MG: 20 INJECTION, SOLUTION INTRAMUSCULAR; INTRAVENOUS at 12:55

## 2024-08-21 RX ADMIN — SUGAMMADEX 200 MG: 100 INJECTION, SOLUTION INTRAVENOUS at 14:18

## 2024-08-21 RX ADMIN — ROCURONIUM BROMIDE 40 MG: 10 INJECTION, SOLUTION INTRAVENOUS at 13:03

## 2024-08-21 RX ADMIN — ONDANSETRON 4 MG: 2 INJECTION INTRAMUSCULAR; INTRAVENOUS at 13:01

## 2024-08-21 RX ADMIN — GLYCOPYRROLATE 0.1 MG: 0.2 INJECTION, SOLUTION INTRAMUSCULAR; INTRAVENOUS at 12:55

## 2024-08-21 RX ADMIN — SODIUM CHLORIDE 50 ML/HR: 9 INJECTION, SOLUTION INTRAVENOUS at 12:43

## 2024-08-21 RX ADMIN — DEXMEDETOMIDINE HYDROCHLORIDE 2 MCG: 100 INJECTION, SOLUTION INTRAVENOUS at 14:00

## 2024-08-21 RX ADMIN — FENTANYL CITRATE 25 MCG: 50 INJECTION, SOLUTION INTRAMUSCULAR; INTRAVENOUS at 13:58

## 2024-08-21 RX ADMIN — FENTANYL CITRATE 25 MCG: 50 INJECTION, SOLUTION INTRAMUSCULAR; INTRAVENOUS at 13:34

## 2024-08-21 RX ADMIN — ROCURONIUM BROMIDE 10 MG: 10 INJECTION, SOLUTION INTRAVENOUS at 12:55

## 2024-08-21 NOTE — H&P
Patient Care Team:  Ulisses Agosto MD as PCP - General (Internal Medicine)      Subjective .     History of present illness:    Ulisses Rivera is a 58 y.o. male who presents today for Procedure(s):  ENDOSCOPIC RETROGRADE CHOLANGIOPANCREATOGRAPHY WITH STENT REMOVAL, DILATION OF BILE DUCT WITH BALLOON WITH SPY GLASS AND BILIARY STENT PLACEMENT  ESOPHAGOGASTRODUODENOSCOPY WITH ULTRASOUND AND FINE NEEDLE ASPIRATION OF HILAR LYMPH NODE for the indications listed below.     The updated Patient Profile was reviewed prior to the procedure, in conjunction with the Physical Exam, including medical conditions, surgical procedures, medications, allergies, family history and social history.     Pre-operatively, I reviewed the indication(s) for the procedure, the risks of the procedure [including but not limited to: unexpected bleeding possibly requiring hospitalization and/or unplanned repeat procedures, perforation possibly requiring surgical treatment, missed lesions and complications of sedation/MAC (also explained by anesthesia staff)].     I have evaluated the patient for risks associated with the planned anesthesia and the procedure to be performed and find the patient an acceptable candidate for IV sedation.    Multiple opportunities were provided for any questions or concerns, and all questions were answered satisfactorily before any anesthesia was administered. We will proceed with the planned procedure.      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  ERCP  EUS      Past Medical History:  Past Medical History:   Diagnosis Date    Allergic When I was 1    Only seasonal allergies and allergic to some foods    Gout 2005    Only rarely    Hammer toe 2018    Hyperlipidemia     Hypertension        Past Surgical History:  Past Surgical History:   Procedure Laterality Date    COLONOSCOPY      ERCP  N/A 8/13/2024    Procedure: ENDOSCOPIC RETROGRADE CHOLANGIOPANCREATOGRAPHY WITH SPHINCTEROTOMY, BALLOON CLEARANCE OF BILE DUCT, AND BILIARY STENT PLACEMENT;  Surgeon: Royce Tomlinson MD;  Location: [facility] ENDOSCOPY;  Service: Gastroenterology;  Laterality: N/A;  CBD polyp    TRACHEOSTOMY         Social History:  Social History     Tobacco Use    Smoking status: Never     Passive exposure: Never    Smokeless tobacco: Never   Vaping Use    Vaping status: Never Used   Substance Use Topics    Alcohol use: Yes     Alcohol/week: 1.0 standard drink of alcohol     Types: 1 Cans of beer per week     Comment: occ    Drug use: Never       Family History:  Family History   Problem Relation Age of Onset    Macular degeneration Mother     Heart attack Father 83    Heart disease Father     Skin cancer Sister     No Known Problems Maternal Grandmother     No Known Problems Maternal Grandfather     No Known Problems Paternal Grandmother     Cancer Paternal Grandfather        Medications:  Medications Prior to Admission   Medication Sig Dispense Refill Last Dose    amLODIPine (NORVASC) 10 MG tablet Take 1 tablet by mouth every night at bedtime. 90 tablet 1 8/20/2024    multivitamin with minerals tablet tablet Take 1 tablet by mouth Daily.   8/14/2024    atorvastatin (LIPITOR) 20 MG tablet Take 1 tablet by mouth every night at bedtime. 90 tablet 1 Unknown       Scheduled Meds:   Continuous Infusions:sodium chloride, 50 mL/hr, Last Rate: 100 mL/hr (08/21/24 1251)      PRN Meds:.  atropine    diphenhydrAMINE    diphenhydrAMINE    ePHEDrine Sulfate (Pressors)    hydrALAZINE    HYDROmorphone    HYDROmorphone    ipratropium-albuterol    labetalol    lidocaine (cardiac)    naloxone    ondansetron    oxyCODONE    oxyCODONE    ALLERGIES:  Nuts, Peanut-containing drug products, and Peanut oil        Objective     Vital Signs:   Temp:  [97.7 °F (36.5 °C)-98.2 °F (36.8 °C)] 97.7 °F (36.5 °C)  Heart Rate:  [58-67] 58  Resp:  [12-14]  12  BP: (122-141)/(75-83) 122/81    Physical Exam:      General Appearance:    Awake and alert, in no acute distress   Lungs:     Clear to auscultation bilaterally, respirations regular, even and unlabored    Heart:    Regular rhythm and normal rate, normal S1 and S2, no            murmur, no gallop, no rub   Abdomen:     Normal bowel sounds, soft, non-tender, no rebound or guarding, non-distended, no hepatosplenomegaly        Results Review:   I reviewed the patient's labs and imaging.  Lab Results (last 24 hours)       ** No results found for the last 24 hours. **            Imaging Results (Last 24 Hours)       Procedure Component Value Units Date/Time    FL ERCP pancreatic and biliary ducts [[id number]] Resulted: 08/21/24 1428     Updated: 08/21/24 1429               I discussed the patients findings and my recommendations with the patient.  Cameron Goddard MD  08/21/24  14:58 EDT

## 2024-08-21 NOTE — OP NOTE
ENDOSCOPIC RETROGRADE CHOLANGIOPANCREATOGRAPHY, ESOPHAGOGASTRODUODENOSCOPY WITH ULTRASOUND AND FINE NEEDLE ASPIRATION Procedure Report    Patient Name:  Ulisses Rivera  YOB: 1965    Date of Surgery:  8/21/2024     Pre-Op Diagnosis:  Jaundice [R17]        Procedure/CPT® Codes:      Procedure(s):  ENDOSCOPIC RETROGRADE CHOLANGIOPANCREATOGRAPHY WITH STENT REMOVAL, DILATION OF BILE DUCT WITH BALLOON WITH SPY GLASS AND BILIARY STENT PLACEMENT  ESOPHAGOGASTRODUODENOSCOPY WITH ULTRASOUND AND FINE NEEDLE ASPIRATION OF HILAR LYMPH NODE    Staff:  Surgeon(s):  Cameron Goddard MD      Anesthesia: General    Description of Procedure:  A description of the procedure as well as risks, benefits and alternative methods were explained to the patient who voiced understanding and signed the corresponding consent form.Specifically risks of post-ERCP pancreatitis, bleeding, perforation, failure to canulate and adverse reaction to sedation were discussed. A physical exam was performed and vital signs were monitored throughout the procedure.    Initially Pentax radial scope and subsequently Pantex linear scope was advanced under direct realization from oropharynx, esophagus stomach and the second part of the duodenum.  EUS finding as detailed below.    A  film was performed which was normal. With the patient in the semi-prone position, an Olympus side viewing endoscope was placed into the mouth and proceeded through the esophagus, stomach and second portion of the duodenum without difficulty. Limited views of the esophagus and stomach were normal. The ampulla was visualized and appeared normal. A [employer] hydrotome was used to cannulate the ampulla using wire guided technique. Bile was aspirated to ensure this was the duct of interest. Contrast was injected into the bile duct.      The scope was then retroflexed and the fundus was visualized. The procedure was not difficult and there were no immediate  complications.    Findings:   EUS findings  Limited evaluation of esophageal gastric duodenal mucosa grossly looks normal.  Scanning of the body and the tail of the pancreas did not show any obvious pancreatic mass lesion.  Pancreatic parenchyma grossly looks normal.  Scanning at the duodenal bulb  did show common bile duct stent traversing through the CBD as well as abnormal looking mucosa lesion which is close to 2 cm or so was noticed.  There was 2 maybe 3 lymph node was seen around the bile duct area these measures 6.8 and 5.9 mm they were hypodense.  Scanning of the head of the pancreas did not show any other obvious pancreatic mass or pancreatic lesion.  At this stage, linear array scope was advanced due to presence of these lymph node, fine-needle biopsy was performed using 25-gauge needle.  Initial cytology showed benign lymphatic tissue further passes saved for staining.    ERCP finding  Stent was removed coming out of the major ampulla with a snare.  After removal of the stent, cannulation of the common bile duct was performed selectively using dream tome preloaded with a wire.  Wire was advanced intrahepatically followed by the catheter with opacification using Omni pack.  Common hepatic and intrahepatic duct was mildly prominent.  Cystic duct takeoff was well-visualized.  Just below the cystic duct Cough filling defect and wall irregularity of the common bile duct was noticed extend at least close to 2 cm or so.  At this stage sphincterotomy was extended and subsequently spyglass was used over the wire advanced above the cystic duct takeoff and common bile duct was directly evaluated.  Very proximal part of the common bile duct mucosa looks relatively normal, 1 cm or so after cystic duct takeoff close 2 cm or so significant mucosal irregularity with polypoidal lesions were noticed consistent with papillary neoplasm of common bile duct as proven with a recent biopsy.  Recent biopsy is also shown to be  high-grade dysplasia.  Further biopsy was not performed during this time.  Subsequent that, spyglass was removed and 10 French 9 cm stent was placed patient Toller procedure very well no major complication was noticed.      Impression:  1.  EUS did not show any pancreatic mass, distal to mid common bile duct mucosal abnormality along with polypoidal lesion were noticed as detailed above.  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  2 small lymph nodes were seen around that area they were biopsied using EUS, initial cytology has been negative for malignant cells.  Initial cytology did show benign lymphatic tissue, subsequent passes were saved for cell block.  2.  Cholangioscopy did confirm significant mucosal abnormality with polyp as well as polypoidal lesion involving mid to distal part of common bile duct below cystic duct takeoff.  3.  10 French 9 cm stent was placed across the stricture above the malignant polypoidal lesion    Recommendations:  Patient will be referred to Dr. Wang.  Follow-up with oncology.  Follow with liver function test.  Follow with Dr. Tomlinson in office as needed.      Cameron Goddard MD     Date: 8/21/2024    Time: 14:30 EDT

## 2024-08-21 NOTE — ANESTHESIA PROCEDURE NOTES
Airway  Urgency: elective    Date/Time: 8/21/2024 12:56 PM  Airway not difficult    General Information and Staff    Patient location during procedure: OR    Indications and Patient Condition  Indications for airway management: airway protection    Preoxygenated: yes  MILS maintained throughout  Mask difficulty assessment: 1 - vent by mask    Final Airway Details  Final airway type: endotracheal airway      Successful airway: ETT  Cuffed: yes   Successful intubation technique: video laryngoscopy  Facilitating devices/methods: intubating stylet  Endotracheal tube insertion site: oral  Blade: Ferro  Blade size: 3  ETT size (mm): 7.0  Cormack-Lehane Classification: grade I - full view of glottis  Placement verified by: chest auscultation and capnometry   Cuff volume (mL): 8  Assessment: lips, teeth, and gum same as pre-op and atraumatic intubation

## 2024-08-21 NOTE — DISCHARGE INSTRUCTIONS
A responsible adult should stay with you and you should rest quietly for the rest of the day.    Do not drink alcohol, drive, operate any heavy machinery or power tools or make any legal/important decisions for the next 24 hours.     Progress your diet as tolerated.  If you begin to experience severe pain, increased shortness of breath, racing heartbeat or a fever above 101 F, seek immediate medical attention.     Follow up with MD as instructed. Call office for results in 3 to 5 days if needed.     Recommendations:  Patient will be referred to Dr. Wang.  Follow-up with oncology.  Follow with liver function test.  Follow with Dr. Tomlinson in office as needed.

## 2024-08-21 NOTE — ANESTHESIA PREPROCEDURE EVALUATION
Anesthesia Evaluation     no history of anesthetic complications:   NPO Solid Status: > 8 hours  NPO Liquid Status: > 8 hours           Airway   Mallampati: II  TM distance: >3 FB  Neck ROM: full  No difficulty expected  Dental - normal exam     Pulmonary - normal exam   Cardiovascular - normal exam    (+) hypertension well controlled, hyperlipidemia      Neuro/Psych  GI/Hepatic/Renal/Endo - negative ROS     Musculoskeletal (-) negative ROS    Abdominal  - normal exam    Bowel sounds: normal.   Substance History - negative use     OB/GYN negative ob/gyn ROS         Other - negative ROS                       Anesthesia Plan    ASA 2     general     intravenous induction     Anesthetic plan, risks, benefits, and alternatives have been provided, discussed and informed consent has been obtained with: patient.  Pre-procedure education provided  Plan discussed with CRNA.      CODE STATUS:

## 2024-08-22 NOTE — ANESTHESIA POSTPROCEDURE EVALUATION
Patient: Ulisses Rivera    Procedure Summary       Date: 08/21/24 Room / Location: [facility] ENDOSCOPY 2 / [facility] ENDOSCOPY    Anesthesia Start: 1251 Anesthesia Stop: 1428    Procedures:       ENDOSCOPIC RETROGRADE CHOLANGIOPANCREATOGRAPHY WITH STENT REMOVAL, DILATION OF BILE DUCT WITH BALLOON WITH SPY GLASS AND BILIARY STENT PLACEMENT      ESOPHAGOGASTRODUODENOSCOPY WITH ULTRASOUND AND FINE NEEDLE ASPIRATION OF HILAR LYMPH NODE Diagnosis:       Jaundice      (Jaundice [R17])    Surgeons: Cameron Goddard MD Provider: Sriram Rojas MD    Anesthesia Type: general ASA Status: 2            Anesthesia Type: general    Vitals  Vitals Value Taken Time   /80 08/21/24 1503   Temp 97.7 °F (36.5 °C) 08/21/24 1438   Pulse 55 08/21/24 1503   Resp 13 08/21/24 1503   SpO2 99 % 08/21/24 1503           Post Anesthesia Care and Evaluation    Patient location during evaluation: PACU  Patient participation: complete - patient participated  Level of consciousness: awake  Pain scale: See nurse's notes for pain score.  Pain management: adequate    Airway patency: patent  Anesthetic complications: No anesthetic complications  PONV Status: none  Cardiovascular status: acceptable  Respiratory status: acceptable and spontaneous ventilation  Hydration status: acceptable    Comments: Patient seen and examined postoperatively; vital signs stable; SpO2 greater than or equal to 90%; cardiopulmonary status stable; nausea/vomiting adequately controlled; pain adequately controlled; no apparent anesthesia complications; patient discharged from anesthesia care when discharge criteria were met

## 2024-08-23 LAB
BEAKER LAB AP INTRAOPERATIVE CONSULTATION: NORMAL
LAB AP CASE REPORT: NORMAL
PATH REPORT.FINAL DX SPEC: NORMAL
PATH REPORT.GROSS SPEC: NORMAL

## 2024-08-31 DIAGNOSIS — I10 PRIMARY HYPERTENSION: ICD-10-CM

## 2024-08-31 DIAGNOSIS — E78.5 HYPERLIPIDEMIA, UNSPECIFIED HYPERLIPIDEMIA TYPE: ICD-10-CM

## 2024-09-02 RX ORDER — AMLODIPINE BESYLATE 10 MG/1
10 TABLET ORAL
Qty: 90 TABLET | Refills: 1 | Status: SHIPPED | OUTPATIENT
Start: 2024-09-02

## 2024-09-02 RX ORDER — ATORVASTATIN CALCIUM 20 MG/1
20 TABLET, FILM COATED ORAL
Qty: 90 TABLET | Refills: 1 | Status: SHIPPED | OUTPATIENT
Start: 2024-09-02

## 2024-09-04 ENCOUNTER — OFFICE VISIT (OUTPATIENT)
Dept: FAMILY MEDICINE CLINIC | Facility: CLINIC | Age: 59
End: 2024-09-04
Payer: COMMERCIAL

## 2024-09-04 ENCOUNTER — LAB (OUTPATIENT)
Dept: FAMILY MEDICINE CLINIC | Facility: CLINIC | Age: 59
End: 2024-09-04
Payer: COMMERCIAL

## 2024-09-04 VITALS
HEIGHT: 75 IN | HEART RATE: 74 BPM | DIASTOLIC BLOOD PRESSURE: 86 MMHG | WEIGHT: 236 LBS | SYSTOLIC BLOOD PRESSURE: 124 MMHG | OXYGEN SATURATION: 98 % | RESPIRATION RATE: 18 BRPM | BODY MASS INDEX: 29.34 KG/M2

## 2024-09-04 DIAGNOSIS — R30.0 DYSURIA: ICD-10-CM

## 2024-09-04 DIAGNOSIS — D49.0 IPMN (INTRADUCTAL PAPILLARY MUCINOUS NEOPLASM): Primary | ICD-10-CM

## 2024-09-04 LAB
ALBUMIN SERPL-MCNC: 3.9 G/DL (ref 3.5–5.2)
ALBUMIN/GLOB SERPL: 1.2 G/DL
ALP SERPL-CCNC: 124 U/L (ref 39–117)
ALT SERPL W P-5'-P-CCNC: 37 U/L (ref 1–41)
ANION GAP SERPL CALCULATED.3IONS-SCNC: 12 MMOL/L (ref 5–15)
AST SERPL-CCNC: 34 U/L (ref 1–40)
BACTERIA UR QL AUTO: ABNORMAL /HPF
BASOPHILS # BLD AUTO: 0.05 10*3/MM3 (ref 0–0.2)
BASOPHILS NFR BLD AUTO: 0.7 % (ref 0–1.5)
BILIRUB SERPL-MCNC: 1.4 MG/DL (ref 0–1.2)
BILIRUB UR QL STRIP: NEGATIVE
BUN SERPL-MCNC: 17 MG/DL (ref 6–20)
BUN/CREAT SERPL: 16.3 (ref 7–25)
CALCIUM SPEC-SCNC: 9.5 MG/DL (ref 8.6–10.5)
CHLORIDE SERPL-SCNC: 106 MMOL/L (ref 98–107)
CLARITY UR: CLEAR
CO2 SERPL-SCNC: 23 MMOL/L (ref 22–29)
COLOR UR: ABNORMAL
CREAT SERPL-MCNC: 1.04 MG/DL (ref 0.76–1.27)
DEPRECATED RDW RBC AUTO: 56.7 FL (ref 37–54)
EGFRCR SERPLBLD CKD-EPI 2021: 82.7 ML/MIN/1.73
EOSINOPHIL # BLD AUTO: 0.27 10*3/MM3 (ref 0–0.4)
EOSINOPHIL NFR BLD AUTO: 3.7 % (ref 0.3–6.2)
ERYTHROCYTE [DISTWIDTH] IN BLOOD BY AUTOMATED COUNT: 16.3 % (ref 12.3–15.4)
GLOBULIN UR ELPH-MCNC: 3.2 GM/DL
GLUCOSE SERPL-MCNC: 83 MG/DL (ref 65–99)
GLUCOSE UR STRIP-MCNC: NEGATIVE MG/DL
HCT VFR BLD AUTO: 39.1 % (ref 37.5–51)
HGB BLD-MCNC: 13.1 G/DL (ref 13–17.7)
HGB UR QL STRIP.AUTO: NEGATIVE
HOLD SPECIMEN: NORMAL
HYALINE CASTS UR QL AUTO: ABNORMAL /LPF
IMM GRANULOCYTES # BLD AUTO: 0.01 10*3/MM3 (ref 0–0.05)
IMM GRANULOCYTES NFR BLD AUTO: 0.1 % (ref 0–0.5)
KETONES UR QL STRIP: ABNORMAL
LEUKOCYTE ESTERASE UR QL STRIP.AUTO: ABNORMAL
LYMPHOCYTES # BLD AUTO: 3.5 10*3/MM3 (ref 0.7–3.1)
LYMPHOCYTES NFR BLD AUTO: 47.5 % (ref 19.6–45.3)
MCH RBC QN AUTO: 32 PG (ref 26.6–33)
MCHC RBC AUTO-ENTMCNC: 33.5 G/DL (ref 31.5–35.7)
MCV RBC AUTO: 95.6 FL (ref 79–97)
MONOCYTES # BLD AUTO: 0.54 10*3/MM3 (ref 0.1–0.9)
MONOCYTES NFR BLD AUTO: 7.3 % (ref 5–12)
MUCOUS THREADS URNS QL MICRO: ABNORMAL /HPF
NEUTROPHILS NFR BLD AUTO: 3 10*3/MM3 (ref 1.7–7)
NEUTROPHILS NFR BLD AUTO: 40.7 % (ref 42.7–76)
NITRITE UR QL STRIP: NEGATIVE
NRBC BLD AUTO-RTO: 0 /100 WBC (ref 0–0.2)
PH UR STRIP.AUTO: 6.5 [PH] (ref 5–8)
PLATELET # BLD AUTO: 263 10*3/MM3 (ref 140–450)
PMV BLD AUTO: 9.7 FL (ref 6–12)
POTASSIUM SERPL-SCNC: 4.6 MMOL/L (ref 3.5–5.2)
PROT SERPL-MCNC: 7.1 G/DL (ref 6–8.5)
PROT UR QL STRIP: ABNORMAL
RBC # BLD AUTO: 4.09 10*6/MM3 (ref 4.14–5.8)
RBC # UR STRIP: ABNORMAL /HPF
REF LAB TEST METHOD: ABNORMAL
SODIUM SERPL-SCNC: 141 MMOL/L (ref 136–145)
SP GR UR STRIP: 1.02 (ref 1–1.03)
SQUAMOUS #/AREA URNS HPF: ABNORMAL /HPF
UROBILINOGEN UR QL STRIP: ABNORMAL
WBC # UR STRIP: ABNORMAL /HPF
WBC NRBC COR # BLD AUTO: 7.37 10*3/MM3 (ref 3.4–10.8)

## 2024-09-04 PROCEDURE — 80053 COMPREHEN METABOLIC PANEL: CPT | Performed by: FAMILY MEDICINE

## 2024-09-04 PROCEDURE — 81001 URINALYSIS AUTO W/SCOPE: CPT | Performed by: FAMILY MEDICINE

## 2024-09-04 PROCEDURE — 99214 OFFICE O/P EST MOD 30 MIN: CPT | Performed by: FAMILY MEDICINE

## 2024-09-04 PROCEDURE — 36415 COLL VENOUS BLD VENIPUNCTURE: CPT | Performed by: FAMILY MEDICINE

## 2024-09-04 PROCEDURE — 85025 COMPLETE CBC W/AUTO DIFF WBC: CPT | Performed by: FAMILY MEDICINE

## 2024-09-04 NOTE — PROGRESS NOTES
Chief Complaint   Patient presents with    Hyperlipidemia    Hypertension     HPI  Ulisses Rivera is a 58 y.o. male that presents for   Chief Complaint   Patient presents with    Hyperlipidemia    Hypertension     IPMN: patient initially messaged about discolored urine in late July. UA concerning for potential UTI and was treated w/ abx. A few weeks later patient was told that he appeared yellow. This prompted presentation to [place] [facility] where bilirubin was 12.8, alk phos 587 and liver enzymes in the low 200s. CT revealed dilated intra and extrahepatic ducts w/ obstructing lesion in the distal common bile duct measuring 1.7cm. Patient was subsequently admitted w/ GI consult. 8/13/24 ERCP revealed polypoid lesion concerning for IPMN. Some of the polypoid lesion was removed and sphincterotomy performed. Pathology did return negative for malignancy but concerning for premalignant cells/IPMN of common bile duct. Of note, CA 19-9 73.1. Patient was discharged home on 8/14/24 but taken back for EUS/ERCP on 8/21/24. EUS negative for pancreatic mass. Stent was placed and lymph nodes biopsied. Lymph nodes returned negative for malignancy. Patient has been referred to Dr Orozco at [facility]     Patient reports 20lb weight loss but otherwise is asymptomatic and feels fine. Reports color/jaundice has resolved.     Review of Systems  Pertinent positives of ROS documented in HPI    The following portions of the patient's history were reviewed and updated as appropriate: problem list, past medical history, past surgical history, allergies, current medication    Problem List Tab  Patient History Tab  Immunizations Tab  Medications Tab  Chart Review Tab  Care Everywhere Tab  Synopsis Tab    PE  Vitals:    09/04/24 0852   BP: 124/86   Pulse: 74   Resp: 18   SpO2: 98%     Body mass index is 29.5 kg/m².  General: Well nourished, NAD  Head: AT/NC  Eyes: EOMI, anicteric sclera  Resp: CTAB, SCR, BS equal  CV: RRR w/o m/r/g; 2+ pulses  GI: Soft,  NT/ND, +BS  MSK: FROM, no deformity, no edema  Skin: Warm, dry, intact  Neuro: Alert and oriented. No focal deficits  Psych: Appropriate mood and affect    Imaging  FL ERCP pancreatic and biliary ducts    Result Date: 8/23/2024  Impression: ERCP with fluoroscopy. Please refer to the procedure report for findings and recommendations. Electronically Signed: Carole Robledo MD  8/23/2024 12:31 PM EDT  Workstation ID: [id number]    FL ERCP pancreatic and biliary ducts    Result Date: 8/13/2024  Impression: Intraoperative imaging during ERCP. Details of the procedure can be found in the endoscopist notes Electronically Signed: Willam Victor MD  8/13/2024 12:42 PM EDT  Workstation ID: [id number]    MRI abdomen w wo contrast mrcp    Result Date: 8/12/2024  Impression: 1. 1.5 x 1.9 x 1.9 cm enhancing soft tissue mass within the lower CBD, with upstream biliary obstruction and dilation. The findings are most worrisome for primary cholangiocarcinoma. Pancreatic malignancy is thought to represent a secondary consideration. Consider ERCP for diagnostic and therapeutic purposes. 2. Couple of small gallstones are present within the gallbladder lumen. Electronically Signed: Carole Robledo MD  8/12/2024 10:42 AM EDT  Workstation ID: [id number]    CT Abdomen Pelvis With Contrast    Result Date: 8/11/2024  Impression: Significant distention of the intrahepatic and extrahepatic bile ducts secondary to a possible obstructing lesion noted within the mid to distal common bile duct measuring 1.7 cm. Differentials include choledocholithiasis, primary biliary malignancy versus pancreatic malignancy. Recommend follow-up with dedicated MRI pancreatic mass protocol with and without contrast with dedicated MRCP images. Multiple prominent to mildly enlarged upper para-aortic and hilary hepatis lymph nodes, nonspecific. There is evidence of developing acute cholecystitis secondary to biliary duct obstruction. Electronically Signed: Alexsander Antony DO   8/11/2024 4:51 PM EDT  Workstation ID: [id number]    XR Foot 3+ View Bilateral    Result Date: 7/1/2024  1. Degenerative changes of each foot as described above. 2. No acute findings. Electronically Signed: Carole Robledo MD  7/1/2024 4:43 PM EDT  Workstation ID: [id number]    Assessment & Plan   Ulisses Rivera is a 58 y.o. male that presents for   Chief Complaint   Patient presents with    Hyperlipidemia    Hypertension     Diagnoses and all orders for this visit:    1. IPMN (intraductal papillary mucinous neoplasm) (Primary): records reviewed. Pathology appears negative for malignancy but still needing addressed given obstruction and premalignant nature  -     CBC & Differential  -     Comprehensive Metabolic Panel  - Plan for surgery consult at [facility] tomorrow- Ernesto  - Cont GI f/u- Davi/Rupesh       Return if symptoms worsen or fail to improve.

## 2024-11-08 ENCOUNTER — TRANSCRIBE ORDERS (OUTPATIENT)
Dept: ADMINISTRATIVE | Facility: HOSPITAL | Age: 59
End: 2024-11-08
Payer: COMMERCIAL

## 2024-11-08 DIAGNOSIS — Z85.09 ENCOUNTER FOR FOLLOW-UP SURVEILLANCE OF CHOLANGIOCARCINOMA: Primary | ICD-10-CM

## 2024-11-08 DIAGNOSIS — Z08 ENCOUNTER FOR FOLLOW-UP SURVEILLANCE OF CHOLANGIOCARCINOMA: Primary | ICD-10-CM

## 2025-03-03 DIAGNOSIS — E78.5 HYPERLIPIDEMIA, UNSPECIFIED HYPERLIPIDEMIA TYPE: ICD-10-CM

## 2025-03-03 DIAGNOSIS — I10 PRIMARY HYPERTENSION: ICD-10-CM

## 2025-03-03 RX ORDER — AMLODIPINE BESYLATE 10 MG/1
10 TABLET ORAL
Qty: 90 TABLET | Refills: 1 | Status: SHIPPED | OUTPATIENT
Start: 2025-03-03

## 2025-03-03 RX ORDER — ATORVASTATIN CALCIUM 20 MG/1
20 TABLET, FILM COATED ORAL
Qty: 90 TABLET | Refills: 1 | Status: SHIPPED | OUTPATIENT
Start: 2025-03-03

## 2025-05-15 ENCOUNTER — HOSPITAL ENCOUNTER (OUTPATIENT)
Dept: CT IMAGING | Facility: HOSPITAL | Age: 60
Discharge: HOME | End: 2025-05-15
Admitting: SURGERY
Payer: COMMERCIAL

## 2025-05-15 DIAGNOSIS — Z85.09 ENCOUNTER FOR FOLLOW-UP SURVEILLANCE OF CHOLANGIOCARCINOMA: ICD-10-CM

## 2025-05-15 DIAGNOSIS — Z08 ENCOUNTER FOR FOLLOW-UP SURVEILLANCE OF CHOLANGIOCARCINOMA: ICD-10-CM

## 2025-05-15 PROCEDURE — 25510000001 IOPAMIDOL PER 1 ML: Performed by: SURGERY

## 2025-05-15 PROCEDURE — 74170 CT ABD WO CNTRST FLWD CNTRST: CPT

## 2025-05-15 RX ORDER — IOPAMIDOL 755 MG/ML
100 INJECTION, SOLUTION INTRAVASCULAR
Status: COMPLETED | OUTPATIENT
Start: 2025-05-15 | End: 2025-05-15

## 2025-05-15 RX ADMIN — IOPAMIDOL 100 ML: 755 INJECTION, SOLUTION INTRAVENOUS at 11:52

## 2025-05-28 ENCOUNTER — TRANSCRIBE ORDERS (OUTPATIENT)
Dept: ADMINISTRATIVE | Facility: HOSPITAL | Age: 60
End: 2025-05-28
Payer: COMMERCIAL

## 2025-05-28 DIAGNOSIS — C22.1 CHOLANGIOCARCINOMA: Primary | ICD-10-CM

## 2025-06-27 ENCOUNTER — TRANSCRIBE ORDERS (OUTPATIENT)
Dept: ADMINISTRATIVE | Facility: HOSPITAL | Age: 60
End: 2025-06-27
Payer: COMMERCIAL

## 2025-06-27 ENCOUNTER — LAB (OUTPATIENT)
Dept: LAB | Facility: HOSPITAL | Age: 60
End: 2025-06-27
Payer: COMMERCIAL

## 2025-06-27 DIAGNOSIS — C22.1: ICD-10-CM

## 2025-06-27 DIAGNOSIS — C22.1: Primary | ICD-10-CM

## 2025-06-27 LAB
ALBUMIN SERPL-MCNC: 4.3 G/DL (ref 3.5–5.2)
ALP SERPL-CCNC: 93 U/L (ref 39–117)
ALT SERPL W P-5'-P-CCNC: 35 U/L (ref 1–41)
AST SERPL-CCNC: 32 U/L (ref 1–40)
BILIRUB CONJ SERPL-MCNC: 0.2 MG/DL (ref 0–0.3)
BILIRUB INDIRECT SERPL-MCNC: 0.3 MG/DL
BILIRUB SERPL-MCNC: 0.5 MG/DL (ref 0–1.2)
CANCER AG19-9 SERPL-ACNC: 5.9 U/ML
PROT SERPL-MCNC: 7 G/DL (ref 6–8.5)

## 2025-06-27 PROCEDURE — 80076 HEPATIC FUNCTION PANEL: CPT

## 2025-06-27 PROCEDURE — 86301 IMMUNOASSAY TUMOR CA 19-9: CPT

## 2025-06-27 PROCEDURE — 36415 COLL VENOUS BLD VENIPUNCTURE: CPT

## (undated) DEVICE — TRAP WIDEEYE POLYP

## (undated) DEVICE — PK ENDO GI 50

## (undated) DEVICE — SPHINCTEROTOME: Brand: DREAMTOME™ RX 44

## (undated) DEVICE — PAD, GROUNDING, UNIVERSAL, SPLIT, 9': Brand: MEDLINE

## (undated) DEVICE — GW JAGWIRE HIPRFM STFF SHFT STR .035IN 450CM

## (undated) DEVICE — ACCESS AND DELIVERY CATHETER: Brand: SPYSCOPE™ DS II

## (undated) DEVICE — ENDOSCOPIC ULTRASOUND FINE NEEDLE BIOPSY (FNB) DEVICE: Brand: ACQUIRE

## (undated) DEVICE — RETRIEVAL BALLOON CATHETER: Brand: EXTRACTOR™ PRO RX

## (undated) DEVICE — DEV POSITION LK AND BIOP CAP SYS

## (undated) DEVICE — HIGH PERFORMANCE GUIDEWIRE: Brand: DREAMWIRE

## (undated) DEVICE — BITEBLOCK ENDO W/STRAP 60F A/ LF DISP

## (undated) DEVICE — SPHINCTEROTOME: Brand: HYDRATOME RX 44

## (undated) DEVICE — SNAR POLYP HOTSNARE/BRAIDED OVL/MINI 7F 2.8X10MM 230CM 1P/U